# Patient Record
Sex: FEMALE | Race: WHITE | NOT HISPANIC OR LATINO | Employment: OTHER | URBAN - METROPOLITAN AREA
[De-identification: names, ages, dates, MRNs, and addresses within clinical notes are randomized per-mention and may not be internally consistent; named-entity substitution may affect disease eponyms.]

---

## 2017-04-04 ENCOUNTER — HOSPITAL ENCOUNTER (OUTPATIENT)
Dept: ONCOLOGY | Facility: CLINIC | Age: 67
Discharge: HOME | End: 2017-04-04
Attending: INTERNAL MEDICINE | Admitting: INTERNAL MEDICINE

## 2017-04-04 LAB
FERRITIN SERPL-MCNC: 41 NG/ML (ref 11–307)
FOLATE SERPL-MCNC: >24.8 NG/ML (ref 5.9–24.8)
IRON SATN MFR SERPL: 23 % (ref 15–50)
IRON SERPL-MCNC: 117 UG/DL (ref 28–170)
TIBC SERPL-MCNC: 504 UG/DL (ref 228–428)

## 2017-04-05 LAB
ANA SER QL IA: NORMAL

## 2017-04-11 ENCOUNTER — CONVERSION ENCOUNTER (OUTPATIENT)
Dept: OTHER | Facility: HOSPITAL | Age: 67
End: 2017-04-11

## 2017-05-02 ENCOUNTER — HOSPITAL ENCOUNTER (OUTPATIENT)
Dept: ONCOLOGY | Facility: CLINIC | Age: 67
Discharge: HOME | End: 2017-05-02
Attending: INTERNAL MEDICINE | Admitting: INTERNAL MEDICINE

## 2017-07-07 ENCOUNTER — HOSPITAL ENCOUNTER (OUTPATIENT)
Dept: ONCOLOGY | Facility: CLINIC | Age: 67
Discharge: HOME | End: 2017-07-07
Attending: INTERNAL MEDICINE | Admitting: INTERNAL MEDICINE

## 2017-08-01 ENCOUNTER — HOSPITAL ENCOUNTER (OUTPATIENT)
Dept: ONCOLOGY | Facility: CLINIC | Age: 67
Discharge: HOME | End: 2017-08-01
Attending: NURSE PRACTITIONER | Admitting: NURSE PRACTITIONER

## 2017-11-02 ENCOUNTER — HOSPITAL ENCOUNTER (OUTPATIENT)
Dept: ONCOLOGY | Facility: CLINIC | Age: 67
Setting detail: INFUSION SERIES
Discharge: HOME | End: 2017-11-02
Attending: INTERNAL MEDICINE | Admitting: INTERNAL MEDICINE

## 2017-11-02 ENCOUNTER — CLINICAL SUPPORT (OUTPATIENT)
Dept: ONCOLOGY | Facility: HOSPITAL | Age: 67
End: 2017-11-02

## 2017-11-02 ENCOUNTER — HOSPITAL ENCOUNTER (OUTPATIENT)
Dept: ONCOLOGY | Facility: HOSPITAL | Age: 67
Discharge: HOME | End: 2017-11-02
Attending: INTERNAL MEDICINE | Admitting: INTERNAL MEDICINE

## 2017-11-02 LAB
FERRITIN SERPL-MCNC: 46 NG/ML (ref 11–307)
VIT B12 SERPL-MCNC: 688 PG/ML (ref 180–914)

## 2017-11-02 NOTE — PROGRESS NOTES
PATIENTS ONCOLOGY RECORD LOCATED IN [facility]      Subjective     Name:  GEMINI HALL     Date:  2017  Address:  [address] [place] [address]  Home: [phone]  :  1950 AGE:  67 y.o.        RECORDS OBTAINED:  Patients Oncology Record is located in [facility]

## 2017-12-14 ENCOUNTER — HOSPITAL ENCOUNTER (OUTPATIENT)
Dept: MAMMOGRAPHY | Facility: HOSPITAL | Age: 67
Discharge: HOME | End: 2017-12-14
Attending: FAMILY MEDICINE | Admitting: FAMILY MEDICINE

## 2018-03-01 ENCOUNTER — CLINICAL SUPPORT (OUTPATIENT)
Dept: ONCOLOGY | Facility: HOSPITAL | Age: 68
End: 2018-03-01

## 2018-03-01 ENCOUNTER — HOSPITAL ENCOUNTER (OUTPATIENT)
Dept: ONCOLOGY | Facility: CLINIC | Age: 68
Setting detail: INFUSION SERIES
Discharge: HOME | End: 2018-03-01
Attending: NURSE PRACTITIONER | Admitting: NURSE PRACTITIONER

## 2018-03-01 ENCOUNTER — HOSPITAL ENCOUNTER (OUTPATIENT)
Dept: ONCOLOGY | Facility: HOSPITAL | Age: 68
Discharge: HOME | End: 2018-03-01
Attending: NURSE PRACTITIONER | Admitting: NURSE PRACTITIONER

## 2018-03-01 NOTE — PROGRESS NOTES
PATIENTS ONCOLOGY RECORD LOCATED IN [facility]      Subjective     Name:  GEMINI HALL     Date:  2018  Address:  [address] [place] [address]  Home: [phone]  :  1950 AGE:  67 y.o.        RECORDS OBTAINED:  Patients Oncology Record is located in [facility]

## 2018-03-08 ENCOUNTER — CLINICAL SUPPORT (OUTPATIENT)
Dept: ONCOLOGY | Facility: HOSPITAL | Age: 68
End: 2018-03-08

## 2018-03-08 ENCOUNTER — HOSPITAL ENCOUNTER (OUTPATIENT)
Dept: ONCOLOGY | Facility: CLINIC | Age: 68
Setting detail: INFUSION SERIES
Discharge: HOME | End: 2018-03-08
Attending: NURSE PRACTITIONER | Admitting: NURSE PRACTITIONER

## 2018-03-08 NOTE — PROGRESS NOTES
PATIENTS ONCOLOGY RECORD LOCATED IN [facility]      Subjective     Name:  GEMINI HALL     Date:  2018  Address:  [address] [place] [address]  Home: [phone]  :  1950 AGE:  67 y.o.        RECORDS OBTAINED:  Patients Oncology Record is located in [facility]

## 2018-04-10 ENCOUNTER — CONVERSION ENCOUNTER (OUTPATIENT)
Dept: OTHER | Facility: HOSPITAL | Age: 68
End: 2018-04-10

## 2018-07-05 ENCOUNTER — HOSPITAL ENCOUNTER (OUTPATIENT)
Dept: ONCOLOGY | Facility: HOSPITAL | Age: 68
Discharge: HOME | End: 2018-07-05
Attending: INTERNAL MEDICINE | Admitting: INTERNAL MEDICINE

## 2018-07-05 ENCOUNTER — CLINICAL SUPPORT (OUTPATIENT)
Dept: ONCOLOGY | Facility: HOSPITAL | Age: 68
End: 2018-07-05

## 2018-07-05 ENCOUNTER — HOSPITAL ENCOUNTER (OUTPATIENT)
Dept: ONCOLOGY | Facility: CLINIC | Age: 68
Setting detail: INFUSION SERIES
Discharge: HOME | End: 2018-07-05
Attending: INTERNAL MEDICINE | Admitting: INTERNAL MEDICINE

## 2018-07-05 LAB
ALBUMIN SERPL-MCNC: 4 G/DL (ref 3.5–4.8)
ALBUMIN/GLOB SERPL: 1.4 {RATIO} (ref 1–1.7)
ALP SERPL-CCNC: 42 IU/L (ref 32–91)
ALT SERPL-CCNC: 34 IU/L (ref 14–54)
ANION GAP SERPL CALC-SCNC: 10.2 MMOL/L (ref 10–20)
AST SERPL-CCNC: 31 IU/L (ref 15–41)
BILIRUB SERPL-MCNC: 0.6 MG/DL (ref 0.3–1.2)
BUN SERPL-MCNC: 10 MG/DL (ref 8–20)
BUN/CREAT SERPL: 12.5 (ref 5.4–26.2)
CALCIUM SERPL-MCNC: 9.3 MG/DL (ref 8.9–10.3)
CHLORIDE SERPL-SCNC: 107 MMOL/L (ref 101–111)
CONV CO2: 25 MMOL/L (ref 22–32)
CONV TOTAL PROTEIN: 6.9 G/DL (ref 6.1–7.9)
CREAT UR-MCNC: 0.8 MG/DL (ref 0.4–1)
GLOBULIN UR ELPH-MCNC: 2.9 G/DL (ref 2.5–3.8)
GLUCOSE SERPL-MCNC: 187 MG/DL (ref 65–99)
POTASSIUM SERPL-SCNC: 4.2 MMOL/L (ref 3.6–5.1)
SODIUM SERPL-SCNC: 138 MMOL/L (ref 136–144)

## 2018-07-05 NOTE — PROGRESS NOTES
PATIENTS ONCOLOGY RECORD LOCATED IN [facility]      Subjective     Name:  GEMINI HALL     Date:  2018  Address:  [address] [place] [address]  Home: [phone]  :  1950 AGE:  68 y.o.        RECORDS OBTAINED:  Patients Oncology Record is located in [facility]

## 2019-01-22 ENCOUNTER — CLINICAL SUPPORT (OUTPATIENT)
Dept: ONCOLOGY | Facility: HOSPITAL | Age: 69
End: 2019-01-22

## 2019-01-22 ENCOUNTER — HOSPITAL ENCOUNTER (OUTPATIENT)
Dept: ONCOLOGY | Facility: CLINIC | Age: 69
Setting detail: INFUSION SERIES
Discharge: HOME | End: 2019-01-22
Attending: NURSE PRACTITIONER | Admitting: NURSE PRACTITIONER

## 2019-01-22 NOTE — PROGRESS NOTES
PATIENTS ONCOLOGY RECORD LOCATED IN [facility]      Subjective     Name:  GEMINI HALL     Date:  2019  Address:  [address]   [place] [address]  Home: [unfilled]  :  1950 AGE:  68 y.o.        RECORDS OBTAINED:  Patients Oncology Record is located in [facility]

## 2019-02-20 ENCOUNTER — HOSPITAL ENCOUNTER (OUTPATIENT)
Dept: MAMMOGRAPHY | Facility: HOSPITAL | Age: 69
Discharge: HOME | End: 2019-02-20
Attending: FAMILY MEDICINE | Admitting: FAMILY MEDICINE

## 2019-06-04 VITALS — DIASTOLIC BLOOD PRESSURE: 68 MMHG | HEART RATE: 88 BPM | SYSTOLIC BLOOD PRESSURE: 147 MMHG | WEIGHT: 144 LBS

## 2019-06-04 VITALS
SYSTOLIC BLOOD PRESSURE: 126 MMHG | WEIGHT: 143.25 LBS | HEIGHT: 62 IN | BODY MASS INDEX: 26.36 KG/M2 | HEART RATE: 95 BPM | DIASTOLIC BLOOD PRESSURE: 63 MMHG

## 2019-07-22 DIAGNOSIS — D69.6 THROMBOCYTOPENIA (HCC): Primary | ICD-10-CM

## 2019-07-22 PROBLEM — E53.8 VITAMIN B12 DEFICIENCY: Status: ACTIVE | Noted: 2019-07-22

## 2019-07-22 PROBLEM — D69.3 ACUTE ITP: Status: ACTIVE | Noted: 2019-07-22

## 2019-07-22 PROBLEM — E61.1 IRON DEFICIENCY: Status: ACTIVE | Noted: 2019-07-22

## 2019-07-22 NOTE — PROGRESS NOTES
Hematology/Oncology Outpatient Follow Up    PATIENT NAME:Gi Velez  :1950  MRN: [id number]  PRIMARY CARE PHYSICIAN: Sissy Herrera DO  REFERRING PHYSICIAN: Jose Sellers MD    Chief Complaint   Patient presents with   • thrombocytopenia     Follow up    • Lymphocytosis     Follow up        HISTORY OF PRESENT ILLNESS:   1. Thrombocytopenia and lymphocytosis diagnosed 2009.  • Ms. Velez was hospitalized at [facility] on 09 with vomiting and diarrhea of one day’s duration.  There was also history of some confusion.  She was found to have a platelet count of 98,000/mm3 on admission, but dropped to 91,000/mm3 on 09, and hence, Hematology consultation was called.  She denied having had any gum bleeds or blood in the urine or stool.  She gave a history of remote nosebleeds.  She gave a history of her platelet count being low a number of years ago from a medicine that Dr. Melendez had given her, but had not had any problems more recently.  A CBC from  had revealed a platelet count of 148,000.  She denies having had any bleeding problems with prior surgeries or a family history of bleeders.  Her work up during the hospitalization revealed on 09 TSH was 0.78 µIU/mL.  On 09 a bone marrow aspiration and biopsy revealed normocellular bone marrow with the absence of iron stores.  Immunophenotyping failed to reveal an abnormal cell population and cytogenetics revealed 44 XX female karyotype.  Serum creatinine was 0.9 mg/dL (0.4-1), EBV IgM was negative, CMV IgM was negative, platelet antibody was negative, ALESSANDRO was negative, and a PT was 25.1 seconds.  On 09, a B12 and folate levels were 289 pg/mL (180-914) and 19.67 ng/mL (3-40) respectively.  A chest x-ray on 09 had no active cardiopulmonary disease and a CT scan of the brain on 09 was normal.  CT scans of the abdomen and pelvis on 09 had small nodular hyperdensities in the periphery of the  gallbladder and evidence of hysterectomy.  On 12/27/09 WBC was 5,200/mm3, hemoglobin 13 gm/dL, platelet count 91,000/mm3 with a differential of 45% segs, 34% lymphocytes, 19% monocytes, 1% eosinophils and 1% basophils.    • Medications reviewed and reveal Mirtazapine, Lyrica, Toprol, and Glimepiride to possibly cause thrombocytopenia.  • 12/16/09 - CT head without contrast revealed normal CT of the brain.    • 12/27/09 - CT abdomen and pelvis revealed evidence of hysterectomy, otherwise unremarkable.  • 12/27/09 - CXR revealed no active cardiopulmonary disease.    • 3/2/11 - Platelet count 115,000.  • 4/4/17 - Patient seen in the office after a six year absence on referral by Shirlene Giron M.D. for thrombocytopenia. WBC 6.3, hemoglobin 13.1, platelet count 127,000, MCV 81.7. Iron 117 (), TIBC 504 (228-428), ferritin 41 (), folate >24.8 (5.9-24.8), Vitamin B12 of 236 (211-911),  (). ALESSANDRO screen negative. PT PTT 12.2 and 27.9 seconds. Platelet antibodies negative. Review of medications revealed that Glimepiride and Lyrica to possibly cause thrombocytopenia.   • 4/7/17 - Patient started on Vitamin B12 at 1000 mcg sublingually daily.   • 5/2/17 - WBC 6.1 with 44% neutrophils, 42% lymphocytes, 8% monocytes, hemoglobin 13.8, MCV 82.9, platelet count 115,000. Discussed Lyrica with patient. She is taking it for neuropathy and has tried other things. Also discussed Glimepiride. Her blood sugars are well-controlled and I have told her that if Dr. Giron changes any treatment for her diabetes in the future then we should look at stopping Glimepiride. Patient has not had any recent GI workup and stool Hemoccult card given. Antiparietal cell and intrinsic factor antibodies negative.   • 8/1/17 - WBC 5.5 with 43% neutrophils, 44% lymphocytes, 8% monocytes, hemoglobin 13.8, MCV 81.2, platelet count 97,000. Patient states that she does bruise easily but that she can account for her bruises. She denied  any bleeding episodes of unexplainable bruising.   • 11/2/17 -  (N), B12 of 688 (N), ferritin 46 (N).   • 3/1/18 - Iron 96 (), TIBC 491 (228-428), iron saturation 20 (15-50), ferritin 51 ().       • 3/8/18 - Stool Hemoccult negative x3.   • 7/5/18 - H. pylori IgG 0.11 (negative), H. pylori IgA 0.13 (negative), H. pylori IgM 0.19 (negative).   • 7/23/19-patient claims not to have ever had a colonoscopy but stool Hemoccults were negative in the past.  Encouraged her to have a colonoscopy scheduled.    Past Medical History:   Diagnosis Date   • Diabetes mellitus (CMS/HCC)     diagnosed in the 1990's   • Irregular heart beat 2005   • Lymphocytosis 12/2009   • Neuropathy     diagnosed in the 1990's   • Thrombocytopenia (CMS/HCC) 12/2009       Past Surgical History:   Procedure Laterality Date   • HYSTERECTOMY      in the 1980's-increased bleeding   • TUBAL ABDOMINAL LIGATION           Current Outpatient Medications:   •  Ascorbic Acid (VITAMIN C PO), VITAMIN C, Disp: , Rfl:   •  atorvastatin (LIPITOR) 40 MG tablet, Take 40 mg by mouth Daily., Disp: , Rfl: 2  •  BIOTIN PO, BIOTIN, Disp: , Rfl:   •  Cholecalciferol 1000 units capsule, VITAMIN D 1000 UNIT ORAL CAPS, Disp: , Rfl:   •  CINNAMON PO, CINNAMON CAPS, Disp: , Rfl:   •  Cyanocobalamin (B-12 PO), B-12, Disp: , Rfl:   •  diltiaZEM (TIAZAC) 120 MG 24 hr capsule, Take 120 mg by mouth Daily., Disp: , Rfl: 5  •  doxylamine (UNISOM) 25 MG tablet, UNISOM TABLET, Disp: , Rfl:   •  glimepiride (AMARYL) 4 MG tablet, Take 4 mg by mouth 2 (Two) Times a Day., Disp: , Rfl: 3  •  lisinopril (PRINIVIL,ZESTRIL) 10 MG tablet, Take 10 mg by mouth Daily., Disp: , Rfl: 0  •  LYRICA 150 MG capsule, Take 150 mg by mouth 2 (Two) Times a Day., Disp: , Rfl: 3  •  metFORMIN ER (GLUCOPHAGE-XR) 500 MG 24 hr tablet, METFORMIN HCL  MG XR24H-TAB, Disp: , Rfl:   •  NOVOFINE PLUS 32G X 4 MM misc, USE DAILY TO INJECT INSULIN., Disp: , Rfl: 5  •  Omega-3 Fatty Acids (FISH OIL  PO), FISH OIL CAPS, Disp: , Rfl:   •  ONE TOUCH ULTRA TEST test strip, USE 1 STRIP EVERY 8 HOURS. E11.9, Disp: , Rfl: 5  •  TRESIBA FLEXTOUCH 200 UNIT/ML solution pen-injector, INJECT 10 UNITS BY SUBCUTANEOUS ROUTE AS PER INSULIN PROTOCOL, Disp: , Rfl: 5    Allergies   Allergen Reactions   • Other Hives     topomax and bee stings       Family History   Problem Relation Age of Onset   • No Known Problems Other        Cancer-related family history is not on file.    Social History     Tobacco Use   • Smoking status: Never Smoker   Substance Use Topics   • Alcohol use: No     Frequency: Never   • Drug use: Not on file       I have reviewed the history of present illness, past medical history, family history, social history, lab results, all notes and other records since the patient was last seen on 1/22/19.    SUBJECTIVE: Patient is here to follow up on thrombocytopenia and lymphocytosis. Reports to pain in her feet that she has had for 20+ years. It is a sharp pain and Lyrica helps with that.     KANDY Pedro present during office visit.           REVIEW OF SYSTEMS:  Review of Systems   Constitutional: Negative for chills and fever.   HENT: Negative for ear pain, mouth sores, nosebleeds and sore throat.    Eyes: Negative for photophobia and visual disturbance.   Respiratory: Negative for wheezing and stridor.    Cardiovascular: Negative for chest pain and palpitations.   Gastrointestinal: Negative for abdominal pain, diarrhea, nausea and vomiting.   Endocrine: Negative for cold intolerance and heat intolerance.   Genitourinary: Negative for dysuria and hematuria.   Musculoskeletal: Negative for joint swelling and neck stiffness.        Bilateral foot pain    Skin: Negative for color change and rash.   Neurological: Negative for seizures and syncope.   Hematological: Negative for adenopathy.        No obvious bleeding   Psychiatric/Behavioral: Negative for agitation, confusion and hallucinations.  "      OBJECTIVE:    Vitals:    07/23/19 0906   BP: 153/80   Pulse: 85   Resp: 18   Temp: 97.6 °F (36.4 °C)   Weight: 65.8 kg (145 lb)   Height: 157.5 cm (62\")   PainSc: 0-No pain       ECOG  (0) Fully active, able to carry on all predisease performance without restriction    Physical Exam   Constitutional: She is oriented to person, place, and time. No distress.   HENT:   Head: Normocephalic and atraumatic.   Dental fillings   Eyes: Conjunctivae and EOM are normal. Right eye exhibits no discharge. Left eye exhibits no discharge. No scleral icterus.   Neck: Normal range of motion. Neck supple. No thyromegaly present.   Cardiovascular: Normal rate, regular rhythm and normal heart sounds. Exam reveals no gallop and no friction rub.   Pulmonary/Chest: Effort normal. No stridor. No respiratory distress. She has no wheezes.   Abdominal: Soft. Bowel sounds are normal. She exhibits no mass. There is no tenderness. There is no rebound and no guarding.   Musculoskeletal: Normal range of motion. She exhibits no tenderness.   Lymphadenopathy:     She has no cervical adenopathy.   Neurological: She is alert and oriented to person, place, and time. She exhibits normal muscle tone.   Skin: Skin is warm. No rash noted. She is not diaphoretic. No erythema.   Psychiatric: She has a normal mood and affect. Her behavior is normal.   Nursing note and vitals reviewed.      RECENT LABS  WBC   Date Value Ref Range Status   07/23/2019 6.36 3.40 - 10.80 10*3/mm3 Final     RBC   Date Value Ref Range Status   07/23/2019 5.19 3.77 - 5.28 10*6/mm3 Final     Hemoglobin   Date Value Ref Range Status   07/23/2019 14.4 12.0 - 15.9 g/dL Final     Hematocrit   Date Value Ref Range Status   07/23/2019 43.2 34.0 - 46.6 % Final     MCV   Date Value Ref Range Status   07/23/2019 83.2 79.0 - 97.0 fL Final     MCH   Date Value Ref Range Status   07/23/2019 27.7 26.6 - 33.0 pg Final     MCHC   Date Value Ref Range Status   07/23/2019 33.3 31.5 - 35.7 g/dL " Final     RDW   Date Value Ref Range Status   07/23/2019 16.4 (H) 12.3 - 15.4 % Final     RDW-SD   Date Value Ref Range Status   07/23/2019 49.2 37.0 - 54.0 fl Final     MPV   Date Value Ref Range Status   07/23/2019 13.5 (H) 6.0 - 12.0 fL Final     Platelets   Date Value Ref Range Status   07/23/2019 109 (L) 140 - 450 10*3/mm3 Final     Neutrophil %   Date Value Ref Range Status   07/23/2019 40.8 (L) 42.7 - 76.0 % Final     Lymphocyte %   Date Value Ref Range Status   07/23/2019 46.7 (H) 19.6 - 45.3 % Final     Monocyte %   Date Value Ref Range Status   07/23/2019 8.2 5.0 - 12.0 % Final     Eosinophil %   Date Value Ref Range Status   07/23/2019 3.8 0.3 - 6.2 % Final     Basophil %   Date Value Ref Range Status   07/23/2019 0.5 0.0 - 1.5 % Final     Neutrophils, Absolute   Date Value Ref Range Status   07/23/2019 2.60 1.70 - 7.00 10*3/mm3 Final     Lymphocytes, Absolute   Date Value Ref Range Status   07/23/2019 2.97 0.70 - 3.10 10*3/mm3 Final     Monocytes, Absolute   Date Value Ref Range Status   07/23/2019 0.52 0.10 - 0.90 10*3/mm3 Final     Eosinophils, Absolute   Date Value Ref Range Status   07/23/2019 0.24 0.00 - 0.40 10*3/mm3 Final     Basophils, Absolute   Date Value Ref Range Status   07/23/2019 0.03 0.00 - 0.20 10*3/mm3 Final     nRBC   Date Value Ref Range Status   01/09/2018 0 0 /100[WBCs] Final       Lab Results   Component Value Date    GLUCOSE 187 (H) 07/05/2018    BUN 10 07/05/2018    CREATININE 0.8 07/05/2018    BCR 12.5 07/05/2018    K 4.2 07/05/2018    CO2 25 07/05/2018    CALCIUM 9.3 07/05/2018    ALBUMIN 4.0 07/05/2018    LABIL2 1.4 07/05/2018    AST 31 07/05/2018    ALT 34 07/05/2018         Assessment/Plan     Thrombocytopenia (CMS/HCC)  - CBC & Differential  - CBC Auto Differential  - Comprehensive Metabolic Panel    Vitamin B12 deficiency    Iron deficiency  - Iron Profile      ASSESSMENT:  Patient has thrombocytopenia on basis of either ITP or 1 of her drugs (Lyrica/glimepiride). Patient  denies having had any nosebleeds, gum bleeds, blood in the urine or blood in the stool.  She does bruise easily.  She takes aspirin 81 mg p.o. daily.  She is still on Lyrica and glimepiride.  Have discussed signs and symptoms to watch for for further drop in platelet count.  Discussed her counts are low but stable.  Her platelet count is adequate for minor surgical procedure.  She continues to take vitamin B12 and is currently taking 1 mg p.o. daily.  She claims never to have had a colonoscopy performed though her stool Hemoccults were negative.  Encourage her to have a screening colonoscopy performed.      PLAN:  I let her know that she should have a screenig colonoscopy.   Iron, TIBC and CMP next visit.         I have reviewed labs results, imaging, vitals, and medications with the patient today. Will follow up in 6 months with NP.    Patient verbalized understanding and is in agreement of the above plan.    I have reviewed and validated the information above.   Jose Sellers M.D., F.A.C.P.        Much of the above report is an electronic transcription//translation of the spoken language to printed text using Dragon Software. As such, the subtleties and finesse of the spoken language may permit erroneous, or at times, nonsensical words or phrases to be inadvertently transcribed; thus changes may be made at a later date to rectify these errors.

## 2019-07-23 ENCOUNTER — OFFICE VISIT (OUTPATIENT)
Dept: ONCOLOGY | Facility: CLINIC | Age: 69
End: 2019-07-23

## 2019-07-23 ENCOUNTER — APPOINTMENT (OUTPATIENT)
Dept: LAB | Facility: HOSPITAL | Age: 69
End: 2019-07-23

## 2019-07-23 VITALS
HEIGHT: 62 IN | TEMPERATURE: 97.6 F | HEART RATE: 85 BPM | DIASTOLIC BLOOD PRESSURE: 80 MMHG | RESPIRATION RATE: 18 BRPM | WEIGHT: 145 LBS | SYSTOLIC BLOOD PRESSURE: 153 MMHG | BODY MASS INDEX: 26.68 KG/M2

## 2019-07-23 DIAGNOSIS — D69.6 THROMBOCYTOPENIA (HCC): ICD-10-CM

## 2019-07-23 DIAGNOSIS — E53.8 VITAMIN B12 DEFICIENCY: Primary | ICD-10-CM

## 2019-07-23 DIAGNOSIS — E61.1 IRON DEFICIENCY: ICD-10-CM

## 2019-07-23 LAB
BASOPHILS # BLD AUTO: 0.03 10*3/MM3 (ref 0–0.2)
BASOPHILS NFR BLD AUTO: 0.5 % (ref 0–1.5)
DEPRECATED RDW RBC AUTO: 49.2 FL (ref 37–54)
EOSINOPHIL # BLD AUTO: 0.24 10*3/MM3 (ref 0–0.4)
EOSINOPHIL NFR BLD AUTO: 3.8 % (ref 0.3–6.2)
ERYTHROCYTE [DISTWIDTH] IN BLOOD BY AUTOMATED COUNT: 16.4 % (ref 12.3–15.4)
HCT VFR BLD AUTO: 43.2 % (ref 34–46.6)
HGB BLD-MCNC: 14.4 G/DL (ref 12–15.9)
LYMPHOCYTES # BLD AUTO: 2.97 10*3/MM3 (ref 0.7–3.1)
LYMPHOCYTES NFR BLD AUTO: 46.7 % (ref 19.6–45.3)
MCH RBC QN AUTO: 27.7 PG (ref 26.6–33)
MCHC RBC AUTO-ENTMCNC: 33.3 G/DL (ref 31.5–35.7)
MCV RBC AUTO: 83.2 FL (ref 79–97)
MONOCYTES # BLD AUTO: 0.52 10*3/MM3 (ref 0.1–0.9)
MONOCYTES NFR BLD AUTO: 8.2 % (ref 5–12)
NEUTROPHILS # BLD AUTO: 2.6 10*3/MM3 (ref 1.7–7)
NEUTROPHILS NFR BLD AUTO: 40.8 % (ref 42.7–76)
PLATELET # BLD AUTO: 109 10*3/MM3 (ref 140–450)
PMV BLD AUTO: 13.5 FL (ref 6–12)
RBC # BLD AUTO: 5.19 10*6/MM3 (ref 3.77–5.28)
WBC NRBC COR # BLD: 6.36 10*3/MM3 (ref 3.4–10.8)

## 2019-07-23 PROCEDURE — 36415 COLL VENOUS BLD VENIPUNCTURE: CPT | Performed by: INTERNAL MEDICINE

## 2019-07-23 PROCEDURE — 99214 OFFICE O/P EST MOD 30 MIN: CPT | Performed by: INTERNAL MEDICINE

## 2019-07-23 PROCEDURE — 85025 COMPLETE CBC W/AUTO DIFF WBC: CPT | Performed by: INTERNAL MEDICINE

## 2019-07-23 RX ORDER — LISINOPRIL 10 MG/1
10 TABLET ORAL DAILY
Refills: 0 | COMMUNITY
Start: 2019-04-19 | End: 2020-06-17

## 2019-07-23 RX ORDER — INSULIN DEGLUDEC 200 U/ML
22 INJECTION, SOLUTION SUBCUTANEOUS DAILY
Refills: 5 | COMMUNITY
Start: 2019-04-23

## 2019-07-23 RX ORDER — GLIMEPIRIDE 4 MG/1
TABLET ORAL
Refills: 3 | COMMUNITY
Start: 2019-04-19 | End: 2022-10-24 | Stop reason: SDUPTHER

## 2019-07-23 RX ORDER — METFORMIN HYDROCHLORIDE 500 MG/1
TABLET, EXTENDED RELEASE ORAL 2 TIMES DAILY
COMMUNITY
Start: 2015-03-31 | End: 2020-06-17

## 2019-07-23 RX ORDER — DILTIAZEM HYDROCHLORIDE 120 MG/1
120 CAPSULE, EXTENDED RELEASE ORAL DAILY
Refills: 5 | COMMUNITY
Start: 2019-06-02 | End: 2022-02-07 | Stop reason: SDUPTHER

## 2019-07-23 RX ORDER — ATORVASTATIN CALCIUM 40 MG/1
40 TABLET, FILM COATED ORAL DAILY
Refills: 2 | COMMUNITY
Start: 2019-04-19 | End: 2022-08-15 | Stop reason: SDUPTHER

## 2019-08-15 ENCOUNTER — TRANSCRIBE ORDERS (OUTPATIENT)
Dept: ADMINISTRATIVE | Facility: HOSPITAL | Age: 69
End: 2019-08-15

## 2019-08-15 DIAGNOSIS — D69.6 THROMBOCYTOPENIA, UNSPECIFIED (HCC): Primary | ICD-10-CM

## 2019-08-15 DIAGNOSIS — M89.9 DISORDER OF BONE: ICD-10-CM

## 2019-08-15 DIAGNOSIS — N95.9 MENOPAUSAL AND POSTMENOPAUSAL DISORDER: ICD-10-CM

## 2019-08-21 ENCOUNTER — HOSPITAL ENCOUNTER (OUTPATIENT)
Dept: BONE DENSITY | Facility: HOSPITAL | Age: 69
Discharge: HOME | End: 2019-08-21
Admitting: FAMILY MEDICINE

## 2019-08-21 DIAGNOSIS — N95.9 MENOPAUSAL AND POSTMENOPAUSAL DISORDER: ICD-10-CM

## 2019-08-21 DIAGNOSIS — M89.9 DISORDER OF BONE: ICD-10-CM

## 2019-08-21 DIAGNOSIS — D69.6 THROMBOCYTOPENIA, UNSPECIFIED (HCC): ICD-10-CM

## 2019-08-21 PROCEDURE — 77080 DXA BONE DENSITY AXIAL: CPT

## 2019-09-17 ENCOUNTER — TELEPHONE (OUTPATIENT)
Dept: ONCOLOGY | Facility: CLINIC | Age: 69
End: 2019-09-17

## 2019-09-17 NOTE — TELEPHONE ENCOUNTER
Ms. Velez left message asking when next appt was.  Returned call, left message with date/time (1/21/20 at 9:30) of next appt.

## 2020-01-21 ENCOUNTER — APPOINTMENT (OUTPATIENT)
Dept: LAB | Facility: HOSPITAL | Age: 70
End: 2020-01-21

## 2020-06-17 ENCOUNTER — OFFICE VISIT (OUTPATIENT)
Dept: ENDOCRINOLOGY | Facility: CLINIC | Age: 70
End: 2020-06-17

## 2020-06-17 VITALS
HEIGHT: 62 IN | SYSTOLIC BLOOD PRESSURE: 118 MMHG | DIASTOLIC BLOOD PRESSURE: 68 MMHG | TEMPERATURE: 98 F | WEIGHT: 142 LBS | OXYGEN SATURATION: 96 % | HEART RATE: 70 BPM | BODY MASS INDEX: 26.13 KG/M2

## 2020-06-17 DIAGNOSIS — E11.69 TYPE 2 DIABETES MELLITUS WITH OTHER SPECIFIED COMPLICATION, UNSPECIFIED WHETHER LONG TERM INSULIN USE (HCC): Primary | ICD-10-CM

## 2020-06-17 DIAGNOSIS — E55.9 VITAMIN D DEFICIENCY: ICD-10-CM

## 2020-06-17 DIAGNOSIS — E78.2 MIXED HYPERLIPIDEMIA: ICD-10-CM

## 2020-06-17 PROBLEM — G47.33 OBSTRUCTIVE SLEEP APNEA: Status: ACTIVE | Noted: 2017-04-11

## 2020-06-17 PROBLEM — E78.5 HYPERLIPIDEMIA: Status: ACTIVE | Noted: 2020-06-17

## 2020-06-17 LAB — GLUCOSE BLDC GLUCOMTR-MCNC: 133 MG/DL (ref 70–130)

## 2020-06-17 PROCEDURE — 99204 OFFICE O/P NEW MOD 45 MIN: CPT | Performed by: INTERNAL MEDICINE

## 2020-06-17 PROCEDURE — 82962 GLUCOSE BLOOD TEST: CPT | Performed by: INTERNAL MEDICINE

## 2020-06-17 RX ORDER — BENAZEPRIL HYDROCHLORIDE 10 MG/1
10 TABLET ORAL DAILY
COMMUNITY
Start: 2020-04-30 | End: 2022-10-24 | Stop reason: SDUPTHER

## 2020-06-17 NOTE — PATIENT INSTRUCTIONS
Schedule MNT. Bring 2 d food records to appt.  Continue exercise.  Continue same meds. Prime insulin pen before every shot.  Check blood before meals & bedtime 2 d / week.  Call if blood sugars are running under 100 or over 200.  F/u in 6 months, with labs prior.

## 2020-06-21 NOTE — PROGRESS NOTES
[facility]    Referring Provider: Dr. Sissy Herrera  Reason for Consultation: Diabetes evaluation & management.    Patient Care Team:  Sissy Herrera DO as PCP - General (Family Medicine)    Chief complaint Diabetes (type 2)      Subjective .     History of present illness:    This is a  70 y.o. female with type 2 Diabetes diagnosed in 1998 during routine exam.  Started on metformin. Then glimepiride added. Still taking both.  Tresiba insulin started in 2017. Taking 16 units daily. Does not prime the pen before each injection.  Seen at the [facility] from [id number]. Graduated.  Attended diabetes education @ [facility].  Has an One Touch Ultra Mini meter 5y old. Blood sugars in the mid 100's. Checking 1-2 x / day.  Walking for exercise.  Taking vit D supplements.  Last eye exam October 2019.    Review of Systems  Review of Systems   Constitutional: Negative for unexpected weight gain.   HENT: Negative for trouble swallowing.    Eyes: Negative for blurred vision.   Respiratory: Negative for shortness of breath.    Cardiovascular: Negative for leg swelling.   Gastrointestinal: Positive for diarrhea. Negative for constipation and nausea.   Endocrine: Positive for polydipsia. Negative for polyuria.   Musculoskeletal:        Pain in feet   Neurological: Positive for tremors and numbness. Negative for headache.       History  Past Medical History:   Diagnosis Date   • Diabetes mellitus (CMS/HCC)     diagnosed in the 1990's   • Hyperlipidemia 2000   • Hypertension 2018   • Irregular heart beat 2005   • Lymphocytosis 12/2009   • Neuropathy     diagnosed in the 1990's   • Thrombocytopenia (CMS/HCC) 12/2009   • Type 2 diabetes mellitus (CMS/HCC) 1998     Past Surgical History:   Procedure Laterality Date   • HYSTERECTOMY      in the 1980's-increased bleeding   • TUBAL ABDOMINAL LIGATION       Family History   Problem Relation Age of Onset   • No Known Problems Other      Social History     Tobacco Use   •  Smoking status: Never Smoker   • Smokeless tobacco: Never Used   Substance Use Topics   • Alcohol use: Yes     Frequency: Never     Comment: rare occasion   • Drug use: Defer     PRN Meds:    Allergies:  Adhesive tape; Other; and Topiramate    Objective     Vital Signs       Vitals:    06/17/20 1002   BP: 118/68   Pulse: 70   Temp: 98 °F (36.7 °C)   SpO2: 96%         Physical Exam:     General Appearance:    Alert, cooperative, in no acute distress   Head:    Normocephalic, without obvious abnormality, atraumatic   Eyes:            Lids and lashes normal, conjunctivae and sclerae normal, no   icterus, no pallor, corneas clear, PERRLA   Throat:   No oral lesions,  oral mucosa moist   Neck:   36 cm in circumference, thyroid firm, no   carotid bruit   Lungs:     Clear    Heart:    Regular rhythm and normal rate   Chest Wall:    No abnormalities observed   Abdomen:     Normal bowel sounds, soft                 Extremities:   Moves all extremities well, no edema               Pulses:   Pulses palpable and equal bilaterally   Skin:   Dry   Neurologic:  DTR absent in ankles, vibratory sense 90% decreased in toes, able to feel the 10g monofilament, except in toes       Results Review  I have reviewed the patient's new clinical results, labs & imaging.    Lab Results (last 24 hours)     ** No results found for the last 24 hours. **        A1C 8.6%; cr 1.0, K 4.3, ALT 25; Chol 157, Trigl 194; TSH 2.6; vit D level 54 on 1/23/2020    Assessment/Plan     1. Diabetes type 2, with hyperglycemia & peripheral neuropathy  2. Hypertension  3. Hyperlipidemia  4. Goiter, euthyroid   5. Vitamin D Deficiency      Schedule MNT. Bring 2 d food records to appt.  Continue exercise.  Continue same meds. Prime insulin pen before every shot.  Check blood before meals & bedtime 2 d / week.  Call if blood sugars are running under 100 or over 200.    I discussed the patients findings and my recommendations with patient    Vic Cerna,  MD  06/21/20  20:35

## 2020-07-10 ENCOUNTER — OFFICE VISIT (OUTPATIENT)
Dept: ENDOCRINOLOGY | Facility: CLINIC | Age: 70
End: 2020-07-10

## 2020-07-10 DIAGNOSIS — E11.65 UNCONTROLLED TYPE 2 DIABETES MELLITUS WITH HYPERGLYCEMIA (HCC): ICD-10-CM

## 2020-07-10 PROCEDURE — 97802 MEDICAL NUTRITION INDIV IN: CPT | Performed by: DIETITIAN, REGISTERED

## 2020-12-08 ENCOUNTER — LAB (OUTPATIENT)
Dept: LAB | Facility: HOSPITAL | Age: 70
End: 2020-12-08

## 2020-12-08 DIAGNOSIS — E78.2 MIXED HYPERLIPIDEMIA: ICD-10-CM

## 2020-12-08 DIAGNOSIS — E11.69 TYPE 2 DIABETES MELLITUS WITH OTHER SPECIFIED COMPLICATION, UNSPECIFIED WHETHER LONG TERM INSULIN USE (HCC): ICD-10-CM

## 2020-12-08 LAB
ALBUMIN SERPL-MCNC: 4.4 G/DL (ref 3.5–5.2)
ALBUMIN/GLOB SERPL: 1.7 G/DL
ALP SERPL-CCNC: 50 U/L (ref 39–117)
ALT SERPL W P-5'-P-CCNC: 32 U/L (ref 1–33)
ANION GAP SERPL CALCULATED.3IONS-SCNC: 12.4 MMOL/L (ref 5–15)
AST SERPL-CCNC: 30 U/L (ref 1–32)
BILIRUB SERPL-MCNC: 0.4 MG/DL (ref 0–1.2)
BUN SERPL-MCNC: 15 MG/DL (ref 8–23)
BUN/CREAT SERPL: 15.5 (ref 7–25)
CALCIUM SPEC-SCNC: 9.4 MG/DL (ref 8.6–10.5)
CHLORIDE SERPL-SCNC: 103 MMOL/L (ref 98–107)
CO2 SERPL-SCNC: 24.6 MMOL/L (ref 22–29)
CREAT SERPL-MCNC: 0.97 MG/DL (ref 0.57–1)
GFR SERPL CREATININE-BSD FRML MDRD: 56 ML/MIN/1.73
GLOBULIN UR ELPH-MCNC: 2.6 GM/DL
GLUCOSE SERPL-MCNC: 174 MG/DL (ref 65–99)
POTASSIUM SERPL-SCNC: 4.5 MMOL/L (ref 3.5–5.2)
PROT SERPL-MCNC: 7 G/DL (ref 6–8.5)
SODIUM SERPL-SCNC: 140 MMOL/L (ref 136–145)

## 2020-12-08 PROCEDURE — 36415 COLL VENOUS BLD VENIPUNCTURE: CPT

## 2020-12-08 PROCEDURE — 83036 HEMOGLOBIN GLYCOSYLATED A1C: CPT

## 2020-12-08 PROCEDURE — 80053 COMPREHEN METABOLIC PANEL: CPT

## 2020-12-09 LAB — HBA1C MFR BLD: 8.6 % (ref 3.5–5.6)

## 2020-12-15 ENCOUNTER — OFFICE VISIT (OUTPATIENT)
Dept: ENDOCRINOLOGY | Facility: CLINIC | Age: 70
End: 2020-12-15

## 2020-12-15 VITALS
HEART RATE: 85 BPM | DIASTOLIC BLOOD PRESSURE: 62 MMHG | TEMPERATURE: 97.8 F | WEIGHT: 143.8 LBS | OXYGEN SATURATION: 98 % | SYSTOLIC BLOOD PRESSURE: 116 MMHG | HEIGHT: 62 IN | BODY MASS INDEX: 26.46 KG/M2

## 2020-12-15 DIAGNOSIS — E78.2 MIXED HYPERLIPIDEMIA: ICD-10-CM

## 2020-12-15 DIAGNOSIS — E11.42 TYPE 2 DIABETES MELLITUS WITH DIABETIC POLYNEUROPATHY, WITH LONG-TERM CURRENT USE OF INSULIN (HCC): Primary | ICD-10-CM

## 2020-12-15 DIAGNOSIS — Z79.4 TYPE 2 DIABETES MELLITUS WITH DIABETIC POLYNEUROPATHY, WITH LONG-TERM CURRENT USE OF INSULIN (HCC): Primary | ICD-10-CM

## 2020-12-15 DIAGNOSIS — E55.9 VITAMIN D DEFICIENCY: ICD-10-CM

## 2020-12-15 LAB — GLUCOSE BLDC GLUCOMTR-MCNC: 278 MG/DL (ref 70–105)

## 2020-12-15 PROCEDURE — 82962 GLUCOSE BLOOD TEST: CPT | Performed by: INTERNAL MEDICINE

## 2020-12-15 PROCEDURE — 99214 OFFICE O/P EST MOD 30 MIN: CPT | Performed by: INTERNAL MEDICINE

## 2020-12-15 RX ORDER — ASPIRIN 81 MG/1
81 TABLET ORAL DAILY
COMMUNITY
End: 2022-10-10

## 2020-12-15 RX ORDER — DOCUSATE SODIUM 100 MG/1
100 CAPSULE, LIQUID FILLED ORAL 2 TIMES DAILY
COMMUNITY

## 2020-12-15 NOTE — PATIENT INSTRUCTIONS
Continue exercise.  Continue diabetes & chol meds & vit D supplements..  Wait 15-30 min to eat after glimepiride & metformin.  Call if blood sugars are running under 100 or over 200.  F/u in 6 months, with fasting labs prior.

## 2020-12-20 NOTE — PROGRESS NOTES
[facility]        Patient Care Team:  Sissy Herrera DO as PCP - General (Family Medicine)    Chief Complaint:    Chief Complaint   Patient presents with   • Diabetes     type 2, bs 278, ate 2.5 hrs ago, insulin same time         Subjective   Here for diabetes f/u  Blood sugars: did not bring records  Exercise program: walking  Eating Lean cuisine  Taking vit D  Attended [facility]NT in July    Interval History:     Patient Complaints: none  Patient Denies:  hypoglycemia  History taken from: patient    Review of Systems:   Review of Systems   Eyes: Negative for blurred vision.   Gastrointestinal: Negative for nausea.   Endocrine: Negative for polyuria.   Neurological: Negative for headache.     Gained 1 lb since last visit    Objective     Vital Signs      Vitals:    12/15/20 1122   BP: 116/62   Pulse: 85   Temp: 97.8 °F (36.6 °C)   SpO2: 98%         Physical Exam:     General Appearance:    Alert, cooperative, in no acute distress   Head:    Normocephalic, without obvious abnormality, atraumatic   Eyes:            Lids and lashes normal, conjunctivae and sclerae normal, no   icterus, no pallor, corneas clear, PERRLA   Throat:   No oral lesions,  oral mucosa moist   Neck:   No adenopathy, supple,  no thyromegaly, no   carotid bruit   Lungs:     Clear     Heart:    Regular rhythm and normal rate   Chest Wall:    No abnormalities observed   Abdomen:     Normal bowel sounds, soft                 Extremities:   Hammer toes, no edema               Pulses:   Pulses palpable and equal bilaterally   Skin:   Dry. No bruising or rash   Neurologic:  DTR absent, able to feel the 10g monofilament, except toes          Results Review:    I have reviewed the patient's new clinical results, labs & imaging.    Medication Review:   Prior to Admission medications    Medication Sig Start Date End Date Taking? Authorizing Provider   Ascorbic Acid (VITAMIN C PO) VITAMIN C 4/10/18  Yes Provider, MD Ros   aspirin 81  MG EC tablet Take 81 mg by mouth Daily.   Yes Ros Shah MD   atorvastatin (LIPITOR) 40 MG tablet Take 40 mg by mouth Daily. 4/19/19  Yes Ros Shah MD   benazepril (LOTENSIN) 10 MG tablet Take 10 mg by mouth Daily. 4/30/20  Yes Ros Shah MD   BIOTIN PO BIOTIN 4/10/18  Yes Ros Shah MD   Cholecalciferol 1000 units capsule VITAMIN D 1000 UNIT ORAL CAPS 3/31/15  Yes Ros Shah MD   CINNAMON PO CINNAMON CAPS 4/10/18  Yes Ros Shah MD   Cyanocobalamin (B-12 PO) B-12 4/10/18  Yes Ros Shah MD   diltiaZEM (TIAZAC) 120 MG 24 hr capsule Take 120 mg by mouth Daily. 6/2/19  Yes Ros Shah MD   docusate sodium (COLACE) 100 MG capsule Take 100 mg by mouth 2 (Two) Times a Day.   Yes Ros Shah MD   doxylamine (UNISOM) 25 MG tablet Take 25 mg by mouth At Night As Needed for Sleep.   Yes Ros Shah MD   glimepiride (AMARYL) 4 MG tablet Take 4 mg by mouth 2 (Two) Times a Day. 4/19/19  Yes Ros Shah MD   LYRICA 150 MG capsule Take 150 mg by mouth 2 (Two) Times a Day. 6/19/19  Yes Ros Shah MD   metFORMIN (GLUCOPHAGE) 500 MG tablet  6/11/20  Yes Ros Shah MD   NOVOFINE PLUS 32G X 4 MM misc USE DAILY TO INJECT INSULIN. 7/2/19  Yes Ros Shah MD   Omega-3 Fatty Acids (FISH OIL PO) FISH OIL CAPS 4/11/17  Yes Ros Shah MD   ONE TOUCH ULTRA TEST test strip USE 1 STRIP EVERY 8 HOURS. E11.9 6/19/19  Yes Ros Shah MD   TRESIBA FLEXTOUCH 200 UNIT/ML solution pen-injector Inject 16 Units under the skin into the appropriate area as directed Daily. 4/23/19  Yes Ros Shah MD       Lab Results (most recent)     Procedure Component Value Units Date/Time    POC Glucose [[id number]]  (Abnormal) Collected: 12/15/20 1122    Specimen: Blood Updated: 12/15/20 1134     Glucose 278 mg/dL      Comment: Serial Number: [id number]:  [id number]             Lab  Results   Component Value Date    HGBA1C 8.6 (H) 12/08/2020      Lab Results   Component Value Date    GLUCOSE 174 (H) 12/08/2020    BUN 15 12/08/2020    CREATININE 0.97 12/08/2020    EGFRIFNONA 56 (L) 12/08/2020    BCR 15.5 12/08/2020    K 4.5 12/08/2020    CO2 24.6 12/08/2020    CALCIUM 9.4 12/08/2020    ALBUMIN 4.40 12/08/2020    LABIL2 1.4 07/05/2018    AST 30 12/08/2020    ALT 32 12/08/2020    CHOL 159 01/10/2018     (H) 01/10/2018    HDL 34 (L) 01/10/2018    TRIG 104 01/10/2018       Assessment/Plan     Diagnoses and all orders for this visit:    1. Type 2 diabetes mellitus with diabetic polyneuropathy, with long-term current use of insulin (CMS/[facility]) (Primary)  -     Hemoglobin A1c; Future  -     Microalbumin / Creatinine Urine Ratio - Urine, Clean Catch; Future    2. Vitamin D deficiency  -     Vitamin D 25 Hydroxy; Future    3. Mixed hyperlipidemia  -     Comprehensive Metabolic Panel; Future  -     Lipid Panel; Future  -     TSH; Future    Other orders  -     POC Glucose    Glucose control not @ goal. Will check lipid & vit D status next visit.    Continue exercise.  Continue diabetes & chol meds & vit D supplements..  Wait 15-30 min to eat after glimepiride & metformin.  Call if blood sugars are running under 100 or over 200.        Vic Cerna MD  12/19/20  21:54 EST

## 2021-01-15 ENCOUNTER — TRANSCRIBE ORDERS (OUTPATIENT)
Dept: ADMINISTRATIVE | Facility: HOSPITAL | Age: 71
End: 2021-01-15

## 2021-01-15 DIAGNOSIS — Z12.31 BREAST CANCER SCREENING BY MAMMOGRAM: Primary | ICD-10-CM

## 2021-01-18 ENCOUNTER — TRANSCRIBE ORDERS (OUTPATIENT)
Dept: ADMINISTRATIVE | Facility: HOSPITAL | Age: 71
End: 2021-01-18

## 2021-01-18 DIAGNOSIS — N95.1 SYMPTOMATIC MENOPAUSAL OR FEMALE CLIMACTERIC STATES: Primary | ICD-10-CM

## 2021-01-27 ENCOUNTER — HOSPITAL ENCOUNTER (OUTPATIENT)
Dept: MAMMOGRAPHY | Facility: HOSPITAL | Age: 71
Discharge: HOME | End: 2021-01-27
Admitting: FAMILY MEDICINE

## 2021-01-27 DIAGNOSIS — Z12.31 BREAST CANCER SCREENING BY MAMMOGRAM: ICD-10-CM

## 2021-01-27 PROCEDURE — 77067 SCR MAMMO BI INCL CAD: CPT

## 2021-01-27 PROCEDURE — 77063 BREAST TOMOSYNTHESIS BI: CPT

## 2021-02-12 ENCOUNTER — APPOINTMENT (OUTPATIENT)
Dept: MAMMOGRAPHY | Facility: HOSPITAL | Age: 71
End: 2021-02-12

## 2021-02-12 ENCOUNTER — HOSPITAL ENCOUNTER (OUTPATIENT)
Dept: ULTRASOUND IMAGING | Facility: HOSPITAL | Age: 71
End: 2021-02-12

## 2021-03-03 ENCOUNTER — HOSPITAL ENCOUNTER (OUTPATIENT)
Dept: ULTRASOUND IMAGING | Facility: HOSPITAL | Age: 71
Discharge: HOME | End: 2021-03-03

## 2021-03-03 ENCOUNTER — HOSPITAL ENCOUNTER (OUTPATIENT)
Dept: MAMMOGRAPHY | Facility: HOSPITAL | Age: 71
Discharge: HOME | End: 2021-03-03

## 2021-03-03 DIAGNOSIS — R92.8 ABNORMALITY OF RIGHT BREAST ON SCREENING MAMMOGRAM: ICD-10-CM

## 2021-03-03 PROCEDURE — 76642 ULTRASOUND BREAST LIMITED: CPT

## 2021-03-03 PROCEDURE — G0279 TOMOSYNTHESIS, MAMMO: HCPCS

## 2021-03-03 PROCEDURE — 77065 DX MAMMO INCL CAD UNI: CPT

## 2021-06-14 ENCOUNTER — LAB (OUTPATIENT)
Dept: LAB | Facility: HOSPITAL | Age: 71
End: 2021-06-14

## 2021-06-14 DIAGNOSIS — E11.42 TYPE 2 DIABETES MELLITUS WITH DIABETIC POLYNEUROPATHY, WITH LONG-TERM CURRENT USE OF INSULIN (HCC): ICD-10-CM

## 2021-06-14 DIAGNOSIS — E55.9 VITAMIN D DEFICIENCY: ICD-10-CM

## 2021-06-14 DIAGNOSIS — Z79.4 TYPE 2 DIABETES MELLITUS WITH DIABETIC POLYNEUROPATHY, WITH LONG-TERM CURRENT USE OF INSULIN (HCC): ICD-10-CM

## 2021-06-14 DIAGNOSIS — E78.2 MIXED HYPERLIPIDEMIA: ICD-10-CM

## 2021-06-14 LAB
25(OH)D3 SERPL-MCNC: 57 NG/ML (ref 30–100)
ALBUMIN SERPL-MCNC: 4.5 G/DL (ref 3.5–5.2)
ALBUMIN UR-MCNC: <1.2 MG/DL
ALBUMIN/GLOB SERPL: 1.8 G/DL
ALP SERPL-CCNC: 47 U/L (ref 39–117)
ALT SERPL W P-5'-P-CCNC: 35 U/L (ref 1–33)
ANION GAP SERPL CALCULATED.3IONS-SCNC: 12.1 MMOL/L (ref 5–15)
AST SERPL-CCNC: 28 U/L (ref 1–32)
BILIRUB SERPL-MCNC: 0.4 MG/DL (ref 0–1.2)
BUN SERPL-MCNC: 17 MG/DL (ref 8–23)
BUN/CREAT SERPL: 15.2 (ref 7–25)
CALCIUM SPEC-SCNC: 9.6 MG/DL (ref 8.6–10.5)
CHLORIDE SERPL-SCNC: 105 MMOL/L (ref 98–107)
CHOLEST SERPL-MCNC: 151 MG/DL (ref 0–200)
CO2 SERPL-SCNC: 22.9 MMOL/L (ref 22–29)
CREAT SERPL-MCNC: 1.12 MG/DL (ref 0.57–1)
CREAT UR-MCNC: 83.9 MG/DL
GFR SERPL CREATININE-BSD FRML MDRD: 48 ML/MIN/1.73
GLOBULIN UR ELPH-MCNC: 2.5 GM/DL
GLUCOSE SERPL-MCNC: 120 MG/DL (ref 65–99)
HBA1C MFR BLD: 8.3 % (ref 3.5–5.6)
HDLC SERPL-MCNC: 32 MG/DL (ref 40–60)
LDLC SERPL CALC-MCNC: 84 MG/DL (ref 0–100)
LDLC/HDLC SERPL: 2.43 {RATIO}
MICROALBUMIN/CREAT UR: NORMAL MG/G{CREAT}
POTASSIUM SERPL-SCNC: 3.6 MMOL/L (ref 3.5–5.2)
PROT SERPL-MCNC: 7 G/DL (ref 6–8.5)
SODIUM SERPL-SCNC: 140 MMOL/L (ref 136–145)
TRIGL SERPL-MCNC: 206 MG/DL (ref 0–150)
TSH SERPL DL<=0.05 MIU/L-ACNC: 3.8 UIU/ML (ref 0.27–4.2)
VLDLC SERPL-MCNC: 35 MG/DL (ref 5–40)

## 2021-06-14 PROCEDURE — 80053 COMPREHEN METABOLIC PANEL: CPT

## 2021-06-14 PROCEDURE — 36415 COLL VENOUS BLD VENIPUNCTURE: CPT

## 2021-06-14 PROCEDURE — 82570 ASSAY OF URINE CREATININE: CPT

## 2021-06-14 PROCEDURE — 82306 VITAMIN D 25 HYDROXY: CPT

## 2021-06-14 PROCEDURE — 84443 ASSAY THYROID STIM HORMONE: CPT

## 2021-06-14 PROCEDURE — 80061 LIPID PANEL: CPT

## 2021-06-14 PROCEDURE — 83036 HEMOGLOBIN GLYCOSYLATED A1C: CPT

## 2021-06-14 PROCEDURE — 82043 UR ALBUMIN QUANTITATIVE: CPT

## 2021-06-21 ENCOUNTER — OFFICE VISIT (OUTPATIENT)
Dept: ENDOCRINOLOGY | Facility: CLINIC | Age: 71
End: 2021-06-21

## 2021-06-21 VITALS
HEART RATE: 84 BPM | TEMPERATURE: 97.1 F | HEIGHT: 62 IN | WEIGHT: 144 LBS | DIASTOLIC BLOOD PRESSURE: 60 MMHG | SYSTOLIC BLOOD PRESSURE: 116 MMHG | BODY MASS INDEX: 26.5 KG/M2 | OXYGEN SATURATION: 97 %

## 2021-06-21 DIAGNOSIS — E55.9 VITAMIN D DEFICIENCY: ICD-10-CM

## 2021-06-21 DIAGNOSIS — E78.2 MIXED HYPERLIPIDEMIA: ICD-10-CM

## 2021-06-21 DIAGNOSIS — E11.42 TYPE 2 DIABETES MELLITUS WITH DIABETIC POLYNEUROPATHY, WITH LONG-TERM CURRENT USE OF INSULIN (HCC): Primary | ICD-10-CM

## 2021-06-21 DIAGNOSIS — Z79.4 TYPE 2 DIABETES MELLITUS WITH DIABETIC POLYNEUROPATHY, WITH LONG-TERM CURRENT USE OF INSULIN (HCC): Primary | ICD-10-CM

## 2021-06-21 LAB — GLUCOSE BLDC GLUCOMTR-MCNC: 176 MG/DL (ref 70–105)

## 2021-06-21 PROCEDURE — 82962 GLUCOSE BLOOD TEST: CPT | Performed by: INTERNAL MEDICINE

## 2021-06-21 PROCEDURE — 99214 OFFICE O/P EST MOD 30 MIN: CPT | Performed by: INTERNAL MEDICINE

## 2021-06-21 RX ORDER — METFORMIN HYDROCHLORIDE 500 MG/1
TABLET, EXTENDED RELEASE ORAL
Qty: 180 TABLET | Refills: 3 | Status: SHIPPED | OUTPATIENT
Start: 2021-06-21 | End: 2022-08-08

## 2021-07-03 NOTE — PROGRESS NOTES
[facility]        Patient Care Team:  Sissy Herrera DO as PCP - General (Family Medicine)    Chief Complaint:    Chief Complaint   Patient presents with   • Diabetes     Type 2,  fasting         Subjective   Here for diabetes f/u  Blood sugars: higher in the evening  Exercise program: walking  Taking vit D    Interval History:     Patient Complaints: diarrhea  Patient Denies:  hypoglycemia  History taken from: patient    Review of Systems:   Review of Systems   Eyes: Negative for blurred vision.   Gastrointestinal: Positive for diarrhea. Negative for nausea.   Endocrine: Negative for polyuria.   Neurological: Negative for headache.     Gained 1 lb since last visit    Objective     Vital Signs      Vitals:    06/21/21 1055   BP: 116/60   Pulse: 84   Temp: 97.1 °F (36.2 °C)   SpO2: 97%         Physical Exam:     General Appearance:    Alert, cooperative, in no acute distress   Head:    Normocephalic, without obvious abnormality, atraumatic   Eyes:            Lids and lashes normal, conjunctivae and sclerae normal, no   icterus, no pallor, corneas clear, PERRLA   Throat:   No oral lesions,  oral mucosa moist   Neck:   36 cm in circumference, thyroid firm, no   carotid bruit   Lungs:     Clear    Heart:    Regular rhythm and normal rate   Chest Wall:    No abnormalities observed   Abdomen:     Normal bowel sounds, soft                 Extremities:   Moves all extremities well, no edema               Pulses:   Pulses palpable and equal bilaterally   Skin:   Dry   Neurologic:  DTR absent in ankles, vibratory sense 90% decreased in toes, able to feel the 10g monofilament (better than 1y ago)          Results Review:    I have reviewed the patient's new clinical results, labs & imaging.    Medication Review:   Prior to Admission medications    Medication Sig Start Date End Date Taking? Authorizing Provider   Ascorbic Acid (VITAMIN C PO) VITAMIN C 4/10/18  Yes Provider, MD Ros   aspirin  81 MG EC tablet Take 81 mg by mouth Daily.   Yes Ros Shah MD   atorvastatin (LIPITOR) 40 MG tablet Take 40 mg by mouth Daily. 4/19/19  Yes Ros Shah MD   benazepril (LOTENSIN) 10 MG tablet Take 10 mg by mouth Daily. 4/30/20  Yes Ros Shah MD   BIOTIN PO BIOTIN 4/10/18  Yes Ros Shah MD   Cholecalciferol 1000 units capsule VITAMIN D 1000 UNIT ORAL CAPS 3/31/15  Yes Ros Shah MD   CINNAMON PO CINNAMON CAPS 4/10/18  Yes Ros Shah MD   Cyanocobalamin (B-12 PO) B-12 4/10/18  Yes Ros Shah MD   diltiaZEM (TIAZAC) 120 MG 24 hr capsule Take 120 mg by mouth Daily. 6/2/19  Yes Ros Shah MD   docusate sodium (COLACE) 100 MG capsule Take 100 mg by mouth 2 (Two) Times a Day.   Yes Ros Shah MD   doxylamine (UNISOM) 25 MG tablet Take 25 mg by mouth At Night As Needed for Sleep.   Yes Ros Shah MD   glimepiride (AMARYL) 4 MG tablet Take 4 mg by mouth 2 (Two) Times a Day. 4/19/19  Yes Ros Shah MD   LYRICA 150 MG capsule Take 150 mg by mouth 2 (Two) Times a Day. 6/19/19  Yes Ros Shah MD   NOVOFINE PLUS 32G X 4 MM misc USE DAILY TO INJECT INSULIN. 7/2/19  Yes Ros Shah MD   Omega-3 Fatty Acids (FISH OIL PO) FISH OIL CAPS 4/11/17  Yes ProviderRos MD   ONE TOUCH ULTRA TEST test strip USE 1 STRIP EVERY 8 HOURS. E11.9 6/19/19  Yes Ros Shah MD TRESIBA FLEXTOUCH 200 UNIT/ML solution pen-injector Inject 20 Units under the skin into the appropriate area as directed Daily. 4/23/19  Yes Ros Shah MD   metFORMIN ER (GLUCOPHAGE-XR) 500 MG 24 hr tablet Take one tab ac breakfast & supper 6/21/21   Vic Cerna MD       Lab Results (most recent)     Procedure Component Value Units Date/Time    POC Glucose [[id number]]  (Abnormal) Collected: 06/21/21 1059    Specimen: Blood Updated: 06/21/21 1100     Glucose 176 mg/dL      Comment: Serial Number:  [id number]:  [id number]           Lab Results   Component Value Date    HGBA1C 8.3 (H) 06/14/2021    HGBA1C 8.6 (H) 12/08/2020      Lab Results   Component Value Date    GLUCOSE 120 (H) 06/14/2021    BUN 17 06/14/2021    CREATININE 1.12 (H) 06/14/2021    EGFRIFNONA 48 (L) 06/14/2021    BCR 15.2 06/14/2021    K 3.6 06/14/2021    CO2 22.9 06/14/2021    CALCIUM 9.6 06/14/2021    ALBUMIN 4.50 06/14/2021    LABIL2 1.4 07/05/2018    AST 28 06/14/2021    ALT 35 (H) 06/14/2021    CHOL 151 06/14/2021    LDL 84 06/14/2021    HDL 32 (L) 06/14/2021    TRIG 206 (H) 06/14/2021     Lab Results   Component Value Date    TSH 3.800 06/14/2021    [id number] 57.0 06/14/2021     Microalb/cr ratio <1    Assessment/Plan     Diagnoses and all orders for this visit:    1. Type 2 diabetes mellitus with diabetic polyneuropathy, with long-term current use of insulin (CMS/[facility]) (Primary)  -     metFORMIN ER (GLUCOPHAGE-XR) 500 MG 24 hr tablet; Take one tab ac breakfast & supper  Dispense: 180 tablet; Refill: 3  -     Hemoglobin A1c; Future    2. Vitamin D deficiency    3. Mixed hyperlipidemia  -     Comprehensive Metabolic Panel; Future    Other orders  -     POC Glucose    Glucose control improved. Lipids & vit D stable.    See eye doctor in October.  Increase exercise as able.  Increase Tresiba to 20 units daily.  Decrease metformin to one tab 2 x / d & change to ER formulation.  Continue other diabetes & chol meds & vit D supplements.  Call if blood sugars are running under 100 or over 200.        Vic Cerna MD  07/02/21  23:10 EDT

## 2021-07-21 ENCOUNTER — ON CAMPUS - OUTPATIENT (AMBULATORY)
Dept: URBAN - METROPOLITAN AREA HOSPITAL 2 | Facility: HOSPITAL | Age: 71
End: 2021-07-21
Payer: MEDICARE

## 2021-07-21 ENCOUNTER — OFFICE (AMBULATORY)
Dept: URBAN - METROPOLITAN AREA PATHOLOGY 4 | Facility: PATHOLOGY | Age: 71
End: 2021-07-21
Payer: COMMERCIAL

## 2021-07-21 ENCOUNTER — OFFICE (AMBULATORY)
Dept: URBAN - METROPOLITAN AREA PATHOLOGY 4 | Facility: PATHOLOGY | Age: 71
End: 2021-07-21

## 2021-07-21 VITALS
OXYGEN SATURATION: 97 % | OXYGEN SATURATION: 98 % | SYSTOLIC BLOOD PRESSURE: 141 MMHG | DIASTOLIC BLOOD PRESSURE: 74 MMHG | SYSTOLIC BLOOD PRESSURE: 89 MMHG | SYSTOLIC BLOOD PRESSURE: 159 MMHG | SYSTOLIC BLOOD PRESSURE: 118 MMHG | TEMPERATURE: 96.9 F | HEART RATE: 75 BPM | SYSTOLIC BLOOD PRESSURE: 146 MMHG | WEIGHT: 146 LBS | DIASTOLIC BLOOD PRESSURE: 36 MMHG | HEART RATE: 74 BPM | HEART RATE: 80 BPM | DIASTOLIC BLOOD PRESSURE: 50 MMHG | SYSTOLIC BLOOD PRESSURE: 164 MMHG | HEART RATE: 86 BPM | SYSTOLIC BLOOD PRESSURE: 117 MMHG | HEART RATE: 79 BPM | OXYGEN SATURATION: 99 % | SYSTOLIC BLOOD PRESSURE: 91 MMHG | DIASTOLIC BLOOD PRESSURE: 46 MMHG | DIASTOLIC BLOOD PRESSURE: 111 MMHG | HEART RATE: 96 BPM | RESPIRATION RATE: 18 BRPM | SYSTOLIC BLOOD PRESSURE: 85 MMHG | HEART RATE: 70 BPM | HEIGHT: 62 IN | DIASTOLIC BLOOD PRESSURE: 65 MMHG | RESPIRATION RATE: 16 BRPM | DIASTOLIC BLOOD PRESSURE: 55 MMHG | HEART RATE: 71 BPM | OXYGEN SATURATION: 95 % | SYSTOLIC BLOOD PRESSURE: 104 MMHG | DIASTOLIC BLOOD PRESSURE: 64 MMHG

## 2021-07-21 DIAGNOSIS — D12.4 BENIGN NEOPLASM OF DESCENDING COLON: ICD-10-CM

## 2021-07-21 DIAGNOSIS — D12.5 BENIGN NEOPLASM OF SIGMOID COLON: ICD-10-CM

## 2021-07-21 DIAGNOSIS — D12.2 BENIGN NEOPLASM OF ASCENDING COLON: ICD-10-CM

## 2021-07-21 DIAGNOSIS — K57.30 DIVERTICULOSIS OF LARGE INTESTINE WITHOUT PERFORATION OR ABS: ICD-10-CM

## 2021-07-21 DIAGNOSIS — K64.1 SECOND DEGREE HEMORRHOIDS: ICD-10-CM

## 2021-07-21 DIAGNOSIS — Z12.11 ENCOUNTER FOR SCREENING FOR MALIGNANT NEOPLASM OF COLON: ICD-10-CM

## 2021-07-21 PROBLEM — K63.5 POLYP OF COLON: Status: ACTIVE | Noted: 2021-07-21

## 2021-07-21 LAB
GI HISTOLOGY: A. UNSPECIFIED: (no result)
GI HISTOLOGY: B. UNSPECIFIED: (no result)
GI HISTOLOGY: C. UNSPECIFIED: (no result)
GI HISTOLOGY: PDF REPORT: (no result)

## 2021-07-21 PROCEDURE — 45385 COLONOSCOPY W/LESION REMOVAL: CPT | Mod: PT | Performed by: INTERNAL MEDICINE

## 2021-07-21 PROCEDURE — 45380 COLONOSCOPY AND BIOPSY: CPT | Mod: 59,PT | Performed by: INTERNAL MEDICINE

## 2021-07-21 PROCEDURE — 88305 TISSUE EXAM BY PATHOLOGIST: CPT | Mod: 26 | Performed by: INTERNAL MEDICINE

## 2021-08-23 ENCOUNTER — HOSPITAL ENCOUNTER (OUTPATIENT)
Dept: BONE DENSITY | Facility: HOSPITAL | Age: 71
Discharge: HOME | End: 2021-08-23
Admitting: FAMILY MEDICINE

## 2021-08-23 DIAGNOSIS — N95.1 SYMPTOMATIC MENOPAUSAL OR FEMALE CLIMACTERIC STATES: ICD-10-CM

## 2021-08-23 PROCEDURE — 77080 DXA BONE DENSITY AXIAL: CPT

## 2021-12-22 ENCOUNTER — LAB (OUTPATIENT)
Dept: LAB | Facility: HOSPITAL | Age: 71
End: 2021-12-22

## 2021-12-22 DIAGNOSIS — Z79.4 TYPE 2 DIABETES MELLITUS WITH DIABETIC POLYNEUROPATHY, WITH LONG-TERM CURRENT USE OF INSULIN (HCC): ICD-10-CM

## 2021-12-22 DIAGNOSIS — E78.2 MIXED HYPERLIPIDEMIA: ICD-10-CM

## 2021-12-22 DIAGNOSIS — E11.42 TYPE 2 DIABETES MELLITUS WITH DIABETIC POLYNEUROPATHY, WITH LONG-TERM CURRENT USE OF INSULIN (HCC): ICD-10-CM

## 2021-12-22 LAB
ALBUMIN SERPL-MCNC: 4.8 G/DL (ref 3.5–5.2)
ALBUMIN/GLOB SERPL: 1.9 G/DL
ALP SERPL-CCNC: 58 U/L (ref 39–117)
ALT SERPL W P-5'-P-CCNC: 21 U/L (ref 1–33)
ANION GAP SERPL CALCULATED.3IONS-SCNC: 14.9 MMOL/L (ref 5–15)
AST SERPL-CCNC: 21 U/L (ref 1–32)
BILIRUB SERPL-MCNC: 0.3 MG/DL (ref 0–1.2)
BUN SERPL-MCNC: 15 MG/DL (ref 8–23)
BUN/CREAT SERPL: 14 (ref 7–25)
CALCIUM SPEC-SCNC: 9.3 MG/DL (ref 8.6–10.5)
CHLORIDE SERPL-SCNC: 102 MMOL/L (ref 98–107)
CO2 SERPL-SCNC: 24.1 MMOL/L (ref 22–29)
CREAT SERPL-MCNC: 1.07 MG/DL (ref 0.57–1)
GFR SERPL CREATININE-BSD FRML MDRD: 50 ML/MIN/1.73
GLOBULIN UR ELPH-MCNC: 2.5 GM/DL
GLUCOSE SERPL-MCNC: 141 MG/DL (ref 65–99)
HBA1C MFR BLD: 8.3 % (ref 3.5–5.6)
POTASSIUM SERPL-SCNC: 3.6 MMOL/L (ref 3.5–5.2)
PROT SERPL-MCNC: 7.3 G/DL (ref 6–8.5)
SODIUM SERPL-SCNC: 141 MMOL/L (ref 136–145)

## 2021-12-22 PROCEDURE — 83036 HEMOGLOBIN GLYCOSYLATED A1C: CPT

## 2021-12-22 PROCEDURE — 36415 COLL VENOUS BLD VENIPUNCTURE: CPT

## 2021-12-22 PROCEDURE — 80053 COMPREHEN METABOLIC PANEL: CPT

## 2021-12-29 ENCOUNTER — OFFICE VISIT (OUTPATIENT)
Dept: ENDOCRINOLOGY | Facility: CLINIC | Age: 71
End: 2021-12-29

## 2021-12-29 VITALS
HEIGHT: 62 IN | DIASTOLIC BLOOD PRESSURE: 80 MMHG | HEART RATE: 98 BPM | BODY MASS INDEX: 25.58 KG/M2 | SYSTOLIC BLOOD PRESSURE: 150 MMHG | TEMPERATURE: 97.1 F | WEIGHT: 139 LBS

## 2021-12-29 DIAGNOSIS — E55.9 VITAMIN D DEFICIENCY: ICD-10-CM

## 2021-12-29 DIAGNOSIS — E78.2 MIXED HYPERLIPIDEMIA: ICD-10-CM

## 2021-12-29 DIAGNOSIS — E11.42 TYPE 2 DIABETES MELLITUS WITH DIABETIC POLYNEUROPATHY, WITH LONG-TERM CURRENT USE OF INSULIN (HCC): Primary | ICD-10-CM

## 2021-12-29 DIAGNOSIS — Z79.4 TYPE 2 DIABETES MELLITUS WITH DIABETIC POLYNEUROPATHY, WITH LONG-TERM CURRENT USE OF INSULIN (HCC): Primary | ICD-10-CM

## 2021-12-29 LAB — GLUCOSE BLDC GLUCOMTR-MCNC: 167 MG/DL (ref 70–105)

## 2021-12-29 PROCEDURE — 82962 GLUCOSE BLOOD TEST: CPT | Performed by: INTERNAL MEDICINE

## 2021-12-29 PROCEDURE — 99214 OFFICE O/P EST MOD 30 MIN: CPT | Performed by: INTERNAL MEDICINE

## 2021-12-29 RX ORDER — CLOTRIMAZOLE AND BETAMETHASONE DIPROPIONATE 10; .64 MG/G; MG/G
CREAM TOPICAL
COMMUNITY
Start: 2021-12-26

## 2021-12-29 RX ORDER — TRIAMCINOLONE ACETONIDE 5 MG/G
CREAM TOPICAL
COMMUNITY
Start: 2021-10-14 | End: 2023-03-06

## 2021-12-29 RX ORDER — DILTIAZEM HYDROCHLORIDE 120 MG/1
TABLET, FILM COATED ORAL
COMMUNITY
Start: 2021-10-30 | End: 2022-02-07 | Stop reason: SDUPTHER

## 2021-12-29 NOTE — PATIENT INSTRUCTIONS
Continue exercise as able.  Decrease salt intake.  Continue metformin, atorvastatin, fish oil & vit D supplements.  Increase Tresiba to 22 units daily.  Call if blood sugars are running under 100 or over 200.  F/u in 6 months, with fasting labs prior.

## 2022-01-24 ENCOUNTER — OFFICE VISIT (OUTPATIENT)
Dept: FAMILY MEDICINE CLINIC | Facility: CLINIC | Age: 72
End: 2022-01-24

## 2022-01-24 VITALS
SYSTOLIC BLOOD PRESSURE: 155 MMHG | WEIGHT: 139.2 LBS | BODY MASS INDEX: 25.62 KG/M2 | HEIGHT: 62 IN | TEMPERATURE: 97.8 F | RESPIRATION RATE: 15 BRPM | HEART RATE: 88 BPM | DIASTOLIC BLOOD PRESSURE: 65 MMHG | OXYGEN SATURATION: 97 %

## 2022-01-24 DIAGNOSIS — I10 PRIMARY HYPERTENSION: ICD-10-CM

## 2022-01-24 DIAGNOSIS — E11.42 DIABETIC PERIPHERAL NEUROPATHY: Primary | ICD-10-CM

## 2022-01-24 DIAGNOSIS — E78.2 MIXED HYPERLIPIDEMIA: ICD-10-CM

## 2022-01-24 DIAGNOSIS — Z79.899 ENCOUNTER FOR LONG-TERM (CURRENT) USE OF OTHER MEDICATIONS: ICD-10-CM

## 2022-01-24 DIAGNOSIS — Z79.4 TYPE 2 DIABETES MELLITUS WITH DIABETIC POLYNEUROPATHY, WITH LONG-TERM CURRENT USE OF INSULIN: ICD-10-CM

## 2022-01-24 DIAGNOSIS — E11.42 TYPE 2 DIABETES MELLITUS WITH DIABETIC POLYNEUROPATHY, WITH LONG-TERM CURRENT USE OF INSULIN: ICD-10-CM

## 2022-01-24 PROCEDURE — 99204 OFFICE O/P NEW MOD 45 MIN: CPT | Performed by: FAMILY MEDICINE

## 2022-01-24 NOTE — PROGRESS NOTES
Chief Complaint  Hypertension, Insomnia, and Peripheral Neuropathy     History of Present Illness    Gi Velez presents today to establish care.  She states she is transferring from Dr. Giron then Dr Herrera in [place] who has now retired.  Patient states that she has HTN and does check her blood pressure  and takes her medication everyday.  Home blood pressure readings range from 114//77 patient would also like to talk about insomnia. Patient states that she normally sleeps less then 2 hours a night. The patient also has neuropathy and has been on the same medication LYRICA 150 MG capsule twice daily for about ten years.  She sees Dr. Tom Cerna for diabetes, every 6 months.   Eye doctor Dr Nation at [facility] for diabetic retinopathy  She sees Dr. Sellers for ITP / thrombocytopenia, follow up on Thursday  She sees Dr. Fernandez for diabetic foot care.  No longer see a cardiologist, has seen Dr Noyola many years ago,  had occasional palpitations and occasional tachycardia. Cath years ago was negative  No longer see neurology, Dr Harris, who origionally started her on Lyrica for diabetic neuropathy.  Became confused on higher dose when tried to increase.   Have had covid and flu vaccinations, have been taking elderberry and have not had covid.     Go to [place] 3 weeks at a time several times a year, more active, eat better. Going next week.    Patient is adopted, does not know parents family history.  Daughter has fibromyalgia, son no medical problems    Current Outpatient Medications on File Prior to Visit   Medication Sig   • Ascorbic Acid (VITAMIN C PO) VITAMIN C   • aspirin 81 MG EC tablet Take 81 mg by mouth Daily.   • atorvastatin (LIPITOR) 40 MG tablet Take 40 mg by mouth Daily.   • benazepril (LOTENSIN) 10 MG tablet Take 10 mg by mouth Daily.   • BIOTIN PO BIOTIN   • Cholecalciferol 1000 units capsule VITAMIN D 1000 UNIT ORAL CAPS   • CINNAMON PO CINNAMON CAPS   •  "clotrimazole-betamethasone (LOTRISONE) 1-0.05 % cream    • Cyanocobalamin (B-12 PO) B-12   • dilTIAZem (CARDIZEM) 120 MG tablet    • diltiaZEM (TIAZAC) 120 MG 24 hr capsule Take 120 mg by mouth Daily.   • docusate sodium (COLACE) 100 MG capsule Take 100 mg by mouth 2 (Two) Times a Day.   • doxylamine (UNISOM) 25 MG tablet Take 25 mg by mouth At Night As Needed for Sleep.   • glimepiride (AMARYL) 4 MG tablet Take 4 mg by mouth 2 (Two) Times a Day.   • metFORMIN ER (GLUCOPHAGE-XR) 500 MG 24 hr tablet Take one tab ac breakfast & supper   • NOVOFINE PLUS 32G X 4 MM misc USE DAILY TO INJECT INSULIN.   • Omega-3 Fatty Acids (FISH OIL PO) FISH OIL CAPS   • ONE TOUCH ULTRA TEST test strip USE 1 STRIP EVERY 8 HOURS. E11.9   • TRESIBA FLEXTOUCH 200 UNIT/ML solution pen-injector Inject 20 Units under the skin into the appropriate area as directed Daily.   • triamcinolone (KENALOG) 0.5 % cream    • [DISCONTINUED] LYRICA 150 MG capsule Take 150 mg by mouth 2 (Two) Times a Day.     No current facility-administered medications on file prior to visit.       Objective   Vital Signs:   /65   Pulse 88   Temp 97.8 °F (36.6 °C) (Temporal)   Resp 15   Ht 157.5 cm (62\")   Wt 63.1 kg (139 lb 3.2 oz)   SpO2 97%   BMI 25.46 kg/m²     Recheck blood pressure right arm sitting 160/80  Physical Exam  Vitals and nursing note reviewed.   Constitutional:       General: She is not in acute distress.     Appearance: She is well-developed.   HENT:      Head: Normocephalic and atraumatic.   Cardiovascular:      Rate and Rhythm: Normal rate and regular rhythm.      Heart sounds: No murmur heard.      Pulmonary:      Effort: Pulmonary effort is normal.      Breath sounds: Normal breath sounds. No wheezing.   Musculoskeletal:         General: Normal range of motion.   Skin:     General: Skin is warm and dry.      Findings: No rash.   Neurological:      Mental Status: She is alert and oriented to person, place, and time.            No visits " with results within 1 Day(s) from this visit.   Latest known visit with results is:   Office Visit on 12/29/2021   Component Date Value Ref Range Status   • Glucose 12/29/2021 167* 70 - 105 mg/dL Final    Serial Number: [id number]:  [id number]       Lab Results   Component Value Date    BUN 15 12/22/2021    CREATININE 1.07 (H) 12/22/2021    EGFRIFNONA 50 (L) 12/22/2021     12/22/2021    K 3.6 12/22/2021     12/22/2021    CALCIUM 9.3 12/22/2021    ALBUMIN 4.80 12/22/2021    BILITOT 0.3 12/22/2021    ALKPHOS 58 12/22/2021    AST 21 12/22/2021    ALT 21 12/22/2021        Lab Results   Component Value Date    HGBA1C 8.3 (H) 12/22/2021    HGBA1C 8.3 (H) 06/14/2021    HGBA1C 8.6 (H) 12/08/2020                Assessment and Plan    Diagnoses and all orders for this visit:    1. Diabetic peripheral neuropathy (HCC) (Primary)  -     Lyrica 150 MG capsule; Take 1 capsule by mouth 2 (Two) Times a Day.  Dispense: 180 capsule; Refill: 1    2. Primary hypertension    3. Type 2 diabetes mellitus with diabetic polyneuropathy, with long-term current use of insulin (HCC)    4. Mixed hyperlipidemia    5. Encounter for long-term (current) use of other medications      Diabetic neuropathy adequately controlled with Lyrica 150 mg twice daily.  Continuing    Type 2 diabetes, followed by endocrinology continue current medications.    Hypertension, elevated today and on previous appointment with Dr. Cerna however home readings are mostly below 130/80 with fairly common readings in the 110's will not increase blood pressure medications today as it could cause symptomatic hypotension.  Did stress DASH diet, increase physical activity, and weight reduction.  If remaining elevated at return visit would consider increasing benazepril or diltiazem.      Medications Discontinued During This Encounter   Medication Reason   • LYRICA 150 MG capsule Reorder         Follow Up     Return in about 6 weeks (around 3/7/2022) for  Annual physical and HTN.    Patient was given instructions and counseling regarding her condition or for health maintenance advice. Please see specific information pulled into the AVS if appropriate.

## 2022-01-24 NOTE — PATIENT INSTRUCTIONS
"https://www.nhlbi.nih.gov/files/docs/public/heart/dash_brief.pdf\">   DASH Eating Plan  DASH stands for Dietary Approaches to Stop Hypertension. The DASH eating plan is a healthy eating plan that has been shown to:  · Reduce high blood pressure (hypertension).  · Reduce your risk for type 2 diabetes, heart disease, and stroke.  · Help with weight loss.  What are tips for following this plan?  Reading food labels  · Check food labels for the amount of salt (sodium) per serving. Choose foods with less than 5 percent of the Daily Value of sodium. Generally, foods with less than 300 milligrams (mg) of sodium per serving fit into this eating plan.  · To find whole grains, look for the word \"whole\" as the first word in the ingredient list.  Shopping  · Buy products labeled as \"low-sodium\" or \"no salt added.\"  · Buy fresh foods. Avoid canned foods and pre-made or frozen meals.  Cooking  · Avoid adding salt when cooking. Use salt-free seasonings or herbs instead of table salt or sea salt. Check with your health care provider or pharmacist before using salt substitutes.  · Do not michelle foods. Cook foods using healthy methods such as baking, boiling, grilling, roasting, and broiling instead.  · Cook with heart-healthy oils, such as olive, canola, avocado, soybean, or sunflower oil.  Meal planning    · Eat a balanced diet that includes:  ? 4 or more servings of fruits and 4 or more servings of vegetables each day. Try to fill one-half of your plate with fruits and vegetables.  ? 6-8 servings of whole grains each day.  ? Less than 6 oz (170 g) of lean meat, poultry, or fish each day. A 3-oz (85-g) serving of meat is about the same size as a deck of cards. One egg equals 1 oz (28 g).  ? 2-3 servings of low-fat dairy each day. One serving is 1 cup (237 mL).  ? 1 serving of nuts, seeds, or beans 5 times each week.  ? 2-3 servings of heart-healthy fats. Healthy fats called omega-3 fatty acids are found in foods such as walnuts, " flaxseeds, fortified milks, and eggs. These fats are also found in cold-water fish, such as sardines, salmon, and mackerel.  · Limit how much you eat of:  ? Canned or prepackaged foods.  ? Food that is high in trans fat, such as some fried foods.  ? Food that is high in saturated fat, such as fatty meat.  ? Desserts and other sweets, sugary drinks, and other foods with added sugar.  ? Full-fat dairy products.  · Do not salt foods before eating.  · Do not eat more than 4 egg yolks a week.  · Try to eat at least 2 vegetarian meals a week.  · Eat more home-cooked food and less restaurant, buffet, and fast food.    Lifestyle  · When eating at a restaurant, ask that your food be prepared with less salt or no salt, if possible.  · If you drink alcohol:  ? Limit how much you use to:  § 0-1 drink a day for women who are not pregnant.  § 0-2 drinks a day for men.  ? Be aware of how much alcohol is in your drink. In the [place] one drink equals one 12 oz bottle of beer (355 mL), one 5 oz glass of wine (148 mL), or one 1½ oz glass of hard liquor (44 mL).  General information  · Avoid eating more than 2,300 mg of salt a day. If you have hypertension, you may need to reduce your sodium intake to 1,500 mg a day.  · Work with your health care provider to maintain a healthy body weight or to lose weight. Ask what an ideal weight is for you.  · Get at least 30 minutes of exercise that causes your heart to beat faster (aerobic exercise) most days of the week. Activities may include walking, swimming, or biking.  · Work with your health care provider or dietitian to adjust your eating plan to your individual calorie needs.  What foods should I eat?  Fruits  All fresh, dried, or frozen fruit. Canned fruit in natural juice (without added sugar).  Vegetables  Fresh or frozen vegetables (raw, steamed, roasted, or grilled). Low-sodium or reduced-sodium tomato and vegetable juice. Low-sodium or reduced-sodium tomato sauce and tomato paste.  Low-sodium or reduced-sodium canned vegetables.  Grains  Whole-grain or whole-wheat bread. Whole-grain or whole-wheat pasta. Brown rice. Oatmeal. Quinoa. Bulgur. Whole-grain and low-sodium cereals. Lashawn bread. Low-fat, low-sodium crackers. Whole-wheat flour tortillas.  Meats and other proteins  Skinless chicken or turkey. Ground chicken or turkey. Pork with fat trimmed off. Fish and seafood. Egg whites. Dried beans, peas, or lentils. Unsalted nuts, nut butters, and seeds. Unsalted canned beans. Lean cuts of beef with fat trimmed off. Low-sodium, lean precooked or cured meat, such as sausages or meat loaves.  Dairy  Low-fat (1%) or fat-free (skim) milk. Reduced-fat, low-fat, or fat-free cheeses. Nonfat, low-sodium ricotta or cottage cheese. Low-fat or nonfat yogurt. Low-fat, low-sodium cheese.  Fats and oils  Soft margarine without trans fats. Vegetable oil. Reduced-fat, low-fat, or light mayonnaise and salad dressings (reduced-sodium). Canola, safflower, olive, avocado, soybean, and sunflower oils. Avocado.  Seasonings and condiments  Herbs. Spices. Seasoning mixes without salt.  Other foods  Unsalted popcorn and pretzels. Fat-free sweets.  The items listed above may not be a complete list of foods and beverages you can eat. Contact a dietitian for more information.  What foods should I avoid?  Fruits  Canned fruit in a light or heavy syrup. Fried fruit. Fruit in cream or butter sauce.  Vegetables  Creamed or fried vegetables. Vegetables in a cheese sauce. Regular canned vegetables (not low-sodium or reduced-sodium). Regular canned tomato sauce and paste (not low-sodium or reduced-sodium). Regular tomato and vegetable juice (not low-sodium or reduced-sodium). Pickles. Olives.  Grains  Baked goods made with fat, such as croissants, muffins, or some breads. Dry pasta or rice meal packs.  Meats and other proteins  Fatty cuts of meat. Ribs. Fried meat. [place]. Bologna, salami, and other precooked or cured meats, such as  sausages or meat loaves. Fat from the back of a pig (fatback). Bratwurst. Salted nuts and seeds. Canned beans with added salt. Canned or smoked fish. Whole eggs or egg yolks. Chicken or turkey with skin.  Dairy  Whole or 2% milk, cream, and half-and-half. Whole or full-fat cream cheese. Whole-fat or sweetened yogurt. Full-fat cheese. Nondairy creamers. Whipped toppings. Processed cheese and cheese spreads.  Fats and oils  Butter. Stick margarine. Lard. Shortening. Ghee. [place]oud fat. Tropical oils, such as coconut, palm kernel, or palm oil.  Seasonings and condiments  Onion salt, garlic salt, seasoned salt, table salt, and sea salt. Worcestershire sauce. Tartar sauce. Barbecue sauce. Teriyaki sauce. Soy sauce, including reduced-sodium. Steak sauce. Canned and packaged gravies. Fish sauce. Oyster sauce. Cocktail sauce. Store-bought horseradish. Ketchup. Mustard. Meat flavorings and tenderizers. Bouillon cubes. Hot sauces. Pre-made or packaged marinades. Pre-made or packaged taco seasonings. Relishes. Regular salad dressings.  Other foods  Salted popcorn and pretzels.  The items listed above may not be a complete list of foods and beverages you should avoid. Contact a dietitian for more information.  Where to find more information  · National Heart, Lung, and Blood [place]: www.nhlbi.nih.gov  · American Heart Association: www.heart.org  · Academy of Nutrition and Dietetics: www.eatright.org  · National Kidney Foundation: www.kidney.org  Summary  · The DASH eating plan is a healthy eating plan that has been shown to reduce high blood pressure (hypertension). It may also reduce your risk for type 2 diabetes, heart disease, and stroke.  · When on the DASH eating plan, aim to eat more fresh fruits and vegetables, whole grains, lean proteins, low-fat dairy, and heart-healthy fats.  · With the DASH eating plan, you should limit salt (sodium) intake to 2,300 mg a day. If you have hypertension, you may need to reduce your  sodium intake to 1,500 mg a day.  · Work with your health care provider or dietitian to adjust your eating plan to your individual calorie needs.  This information is not intended to replace advice given to you by your health care provider. Make sure you discuss any questions you have with your health care provider.  Document Revised: 11/20/2020 Document Reviewed: 11/20/2020  [employer] Patient Education © 2021 [employer]    Managing Your Hypertension  Hypertension, also called high blood pressure, is when the force of the blood pressing against the walls of the arteries is too strong. Arteries are blood vessels that carry blood from your heart throughout your body. Hypertension forces the heart to work harder to pump blood and may cause the arteries to become narrow or stiff.  Understanding blood pressure readings  Your personal target blood pressure may vary depending on your medical conditions, your age, and other factors. A blood pressure reading includes a higher number over a lower number. Ideally, your blood pressure should be below 120/80. You should know that:  · The first, or top, number is called the systolic pressure. It is a measure of the pressure in your arteries as your heart beats.  · The second, or bottom number, is called the diastolic pressure. It is a measure of the pressure in your arteries as the heart relaxes.  Blood pressure is classified into four stages. Based on your blood pressure reading, your health care provider may use the following stages to determine what type of treatment you need, if any. Systolic pressure and diastolic pressure are measured in a unit called mmHg.  Normal  · Systolic pressure: below 120.  · Diastolic pressure: below 80.  Elevated  · Systolic pressure: 120-129.  · Diastolic pressure: below 80.  Hypertension stage 1  · Systolic pressure: 130-139.  · Diastolic pressure: 80-89.  Hypertension stage 2  · Systolic pressure: 140 or above.  · Diastolic pressure: 90 or  above.  How can this condition affect me?  Managing your hypertension is an important responsibility. Over time, hypertension can damage the arteries and decrease blood flow to important parts of the body, including the brain, heart, and kidneys. Having untreated or uncontrolled hypertension can lead to:  · A heart attack.  · A stroke.  · A weakened blood vessel (aneurysm).  · Heart failure.  · Kidney damage.  · Eye damage.  · Metabolic syndrome.  · Memory and concentration problems.  · Vascular dementia.  What actions can I take to manage this condition?  Hypertension can be managed by making lifestyle changes and possibly by taking medicines. Your health care provider will help you make a plan to bring your blood pressure within a normal range.  Nutrition    · Eat a diet that is high in fiber and potassium, and low in salt (sodium), added sugar, and fat. An example eating plan is called the Dietary Approaches to Stop Hypertension (DASH) diet. To eat this way:  ? Eat plenty of fresh fruits and vegetables. Try to fill one-half of your plate at each meal with fruits and vegetables.  ? Eat whole grains, such as whole-wheat pasta, brown rice, or whole-grain bread. Fill about one-fourth of your plate with whole grains.  ? Eat low-fat dairy products.  ? Avoid fatty cuts of meat, processed or cured meats, and poultry with skin. Fill about one-fourth of your plate with lean proteins such as fish, chicken without skin, beans, eggs, and tofu.  ? Avoid pre-made and processed foods. These tend to be higher in sodium, added sugar, and fat.  · Reduce your daily sodium intake. Most people with hypertension should eat less than 1,500 mg of sodium a day.    Lifestyle    · Work with your health care provider to maintain a healthy body weight or to lose weight. Ask what an ideal weight is for you.  · Get at least 30 minutes of exercise that causes your heart to beat faster (aerobic exercise) most days of the week. Activities may  include walking, swimming, or biking.  · Include exercise to strengthen your muscles (resistance exercise), such as weight lifting, as part of your weekly exercise routine. Try to do these types of exercises for 30 minutes at least 3 days a week.  · Do not use any products that contain nicotine or tobacco, such as cigarettes, e-cigarettes, and chewing tobacco. If you need help quitting, ask your health care provider.  · Control any long-term (chronic) conditions you have, such as high cholesterol or diabetes.  · Identify your sources of stress and find ways to manage stress. This may include meditation, deep breathing, or making time for fun activities.    Alcohol use  · Do not drink alcohol if:  ? Your health care provider tells you not to drink.  ? You are pregnant, may be pregnant, or are planning to become pregnant.  · If you drink alcohol:  ? Limit how much you use to:  § 0-1 drink a day for women.  § 0-2 drinks a day for men.  ? Be aware of how much alcohol is in your drink. In the [place] one drink equals one 12 oz bottle of beer (355 mL), one 5 oz glass of wine (148 mL), or one 1½ oz glass of hard liquor (44 mL).  Medicines  Your health care provider may prescribe medicine if lifestyle changes are not enough to get your blood pressure under control and if:  · Your systolic blood pressure is 130 or higher.  · Your diastolic blood pressure is 80 or higher.  Take medicines only as told by your health care provider. Follow the directions carefully. Blood pressure medicines must be taken as told by your health care provider. The medicine does not work as well when you skip doses. Skipping doses also puts you at risk for problems.  Monitoring  Before you monitor your blood pressure:  · Do not smoke, drink caffeinated beverages, or exercise within 30 minutes before taking a measurement.  · Use the bathroom and empty your bladder (urinate).  · Sit quietly for at least 5 minutes before taking measurements.  Monitor  your blood pressure at home as told by your health care provider. To do this:  · Sit with your back straight and supported.  · Place your feet flat on the floor. Do not cross your legs.  · Support your arm on a flat surface, such as a table. Make sure your upper arm is at heart level.  · Each time you measure, take two or three readings one minute apart and record the results.  You may also need to have your blood pressure checked regularly by your health care provider.  General information  · Talk with your health care provider about your diet, exercise habits, and other lifestyle factors that may be contributing to hypertension.  · Review all the medicines you take with your health care provider because there may be side effects or interactions.  · Keep all visits as told by your health care provider. Your health care provider can help you create and adjust your plan for managing your high blood pressure.  Where to find more information  · National Heart, Lung, and Blood [place]: www.nhlbi.nih.gov  · American Heart Association: www.heart.org  Contact a health care provider if:  · You think you are having a reaction to medicines you have taken.  · You have repeated (recurrent) headaches.  · You feel dizzy.  · You have swelling in your ankles.  · You have trouble with your vision.  Get help right away if:  · You develop a severe headache or confusion.  · You have unusual weakness or numbness, or you feel faint.  · You have severe pain in your chest or abdomen.  · You vomit repeatedly.  · You have trouble breathing.  These symptoms may represent a serious problem that is an emergency. Do not wait to see if the symptoms will go away. Get medical help right away. Call your local emergency services (911 in the [place] Do not drive yourself to the hospital.  Summary  · Hypertension is when the force of blood pumping through your arteries is too strong. If this condition is not controlled, it may put you at risk for  serious complications.  · Your personal target blood pressure may vary depending on your medical conditions, your age, and other factors. For most people, a normal blood pressure is less than 120/80.  · Hypertension is managed by lifestyle changes, medicines, or both.  · Lifestyle changes to help manage hypertension include losing weight, eating a healthy, low-sodium diet, exercising more, stopping smoking, and limiting alcohol.  This information is not intended to replace advice given to you by your health care provider. Make sure you discuss any questions you have with your health care provider.  Document Revised: 01/22/2021 Document Reviewed: 11/17/2020  [employer] Patient Education © 2021 [employer].

## 2022-01-25 ENCOUNTER — TELEPHONE (OUTPATIENT)
Dept: FAMILY MEDICINE CLINIC | Facility: CLINIC | Age: 72
End: 2022-01-25

## 2022-01-25 DIAGNOSIS — E11.42 DIABETIC PERIPHERAL NEUROPATHY: Primary | ICD-10-CM

## 2022-01-25 RX ORDER — PREGABALIN 150 MG/1
150 CAPSULE ORAL 2 TIMES DAILY
Qty: 180 CAPSULE | Refills: 1 | Status: SHIPPED | OUTPATIENT
Start: 2022-01-25 | End: 2022-07-26

## 2022-01-25 NOTE — TELEPHONE ENCOUNTER
Lyrica 150 MG capsule  150 mg, 2 Times Daily 1 ordered         Summary: Take 1 capsule by mouth 2 (Two) Times a Day         PHARMACY  [id number] - [place], [place] - [address] [phone] [facility] [phone] [phone]      PATIENT IS NEEDING THIS TO SAY GENERIC   PREGABALIN     SHE IS OUT OF MEDS AND NEEDS ASAP

## 2022-02-07 ENCOUNTER — LAB REQUISITION (OUTPATIENT)
Dept: LAB | Facility: HOSPITAL | Age: 72
End: 2022-02-07

## 2022-02-07 DIAGNOSIS — R94.5 ABNORMAL RESULTS OF LIVER FUNCTION STUDIES: ICD-10-CM

## 2022-02-07 DIAGNOSIS — I10 PRIMARY HYPERTENSION: Primary | ICD-10-CM

## 2022-02-07 LAB
ALBUMIN SERPL-MCNC: 4.3 G/DL (ref 3.5–5.2)
ALBUMIN/GLOB SERPL: 2 G/DL
ALP SERPL-CCNC: 58 U/L (ref 39–117)
ALT SERPL W P-5'-P-CCNC: 32 U/L (ref 1–33)
ANION GAP SERPL CALCULATED.3IONS-SCNC: 16 MMOL/L (ref 5–15)
AST SERPL-CCNC: 26 U/L (ref 1–32)
BILIRUB SERPL-MCNC: 0.3 MG/DL (ref 0–1.2)
BUN SERPL-MCNC: 16 MG/DL (ref 8–23)
BUN/CREAT SERPL: 16.5 (ref 7–25)
CALCIUM SPEC-SCNC: 9.5 MG/DL (ref 8.6–10.5)
CHLORIDE SERPL-SCNC: 101 MMOL/L (ref 98–107)
CO2 SERPL-SCNC: 22 MMOL/L (ref 22–29)
CREAT SERPL-MCNC: 0.97 MG/DL (ref 0.57–1)
GFR SERPL CREATININE-BSD FRML MDRD: 56 ML/MIN/1.73
GLOBULIN UR ELPH-MCNC: 2.1 GM/DL
GLUCOSE SERPL-MCNC: 343 MG/DL (ref 65–99)
POTASSIUM SERPL-SCNC: 3.9 MMOL/L (ref 3.5–5.2)
PROT SERPL-MCNC: 6.4 G/DL (ref 6–8.5)
SODIUM SERPL-SCNC: 139 MMOL/L (ref 136–145)

## 2022-02-07 PROCEDURE — 80053 COMPREHEN METABOLIC PANEL: CPT | Performed by: INTERNAL MEDICINE

## 2022-02-07 RX ORDER — DILTIAZEM HYDROCHLORIDE 120 MG/1
120 CAPSULE, EXTENDED RELEASE ORAL DAILY
Qty: 30 CAPSULE | Refills: 5 | Status: SHIPPED | OUTPATIENT
Start: 2022-02-07 | End: 2022-08-22 | Stop reason: SDUPTHER

## 2022-02-07 RX ORDER — DILTIAZEM HYDROCHLORIDE 120 MG/1
TABLET, FILM COATED ORAL
Status: CANCELLED | OUTPATIENT
Start: 2022-02-07

## 2022-02-07 NOTE — TELEPHONE ENCOUNTER
Please inform patient I sent in Tiazac, a 24-hour controlled release version of diltiazem which was also on her medication list. To take immediate release only once a day is not appropriate, I have removed this version from her medication list..

## 2022-02-07 NOTE — TELEPHONE ENCOUNTER
Caller: Gi Velez    Relationship: Self    Best call back number: [phone]      Requested Prescriptions:   Requested Prescriptions     Pending Prescriptions Disp Refills   • dilTIAZem (CARDIZEM) 120 MG tablet          Pharmacy where request should be sent: [id number] - [place], [place] - [address] [phone] [facility] [phone]      Additional details provided by patient: HAS LESS THAN 3 DAYS.    Does the patient have less than a 3 day supply:  [x] Yes  [] No    Paige Brandon, PCT   02/07/22 13:16 EST

## 2022-02-08 ENCOUNTER — TRANSCRIBE ORDERS (OUTPATIENT)
Dept: ADMINISTRATIVE | Facility: HOSPITAL | Age: 72
End: 2022-02-08

## 2022-02-08 DIAGNOSIS — Z12.31 VISIT FOR SCREENING MAMMOGRAM: Primary | ICD-10-CM

## 2022-03-07 ENCOUNTER — HOSPITAL ENCOUNTER (OUTPATIENT)
Dept: MAMMOGRAPHY | Facility: HOSPITAL | Age: 72
Discharge: HOME | End: 2022-03-07
Admitting: FAMILY MEDICINE

## 2022-03-07 DIAGNOSIS — Z12.31 VISIT FOR SCREENING MAMMOGRAM: ICD-10-CM

## 2022-03-07 PROCEDURE — 77063 BREAST TOMOSYNTHESIS BI: CPT

## 2022-03-07 PROCEDURE — 77067 SCR MAMMO BI INCL CAD: CPT

## 2022-03-08 ENCOUNTER — TELEPHONE (OUTPATIENT)
Dept: FAMILY MEDICINE CLINIC | Facility: CLINIC | Age: 72
End: 2022-03-08

## 2022-03-08 NOTE — TELEPHONE ENCOUNTER
----- Message from Mercedez Guevara MD sent at 3/7/2022  6:11 PM EST -----  Elsie,  Please inform patient that her mammogram is normal, BI-RADS 1, should repeat in 1 year.  Mercedez Guevara MD

## 2022-04-25 ENCOUNTER — OFFICE VISIT (OUTPATIENT)
Dept: FAMILY MEDICINE CLINIC | Facility: CLINIC | Age: 72
End: 2022-04-25

## 2022-04-25 VITALS
HEART RATE: 100 BPM | OXYGEN SATURATION: 97 % | DIASTOLIC BLOOD PRESSURE: 68 MMHG | SYSTOLIC BLOOD PRESSURE: 138 MMHG | WEIGHT: 142.8 LBS | RESPIRATION RATE: 14 BRPM | TEMPERATURE: 98 F | HEIGHT: 62 IN | BODY MASS INDEX: 26.28 KG/M2

## 2022-04-25 DIAGNOSIS — G47.00 INSOMNIA, UNSPECIFIED TYPE: ICD-10-CM

## 2022-04-25 DIAGNOSIS — R53.82 CHRONIC FATIGUE: ICD-10-CM

## 2022-04-25 DIAGNOSIS — E11.65 TYPE 2 DIABETES MELLITUS WITH HYPERGLYCEMIA, WITH LONG-TERM CURRENT USE OF INSULIN: ICD-10-CM

## 2022-04-25 DIAGNOSIS — D69.6 THROMBOCYTOPENIA: ICD-10-CM

## 2022-04-25 DIAGNOSIS — E61.1 IRON DEFICIENCY: ICD-10-CM

## 2022-04-25 DIAGNOSIS — Z79.4 TYPE 2 DIABETES MELLITUS WITH HYPERGLYCEMIA, WITH LONG-TERM CURRENT USE OF INSULIN: ICD-10-CM

## 2022-04-25 DIAGNOSIS — D69.3 ACUTE ITP: ICD-10-CM

## 2022-04-25 DIAGNOSIS — I10 PRIMARY HYPERTENSION: Primary | ICD-10-CM

## 2022-04-25 DIAGNOSIS — E53.8 VITAMIN B12 DEFICIENCY: ICD-10-CM

## 2022-04-25 DIAGNOSIS — E11.42 DIABETIC PERIPHERAL NEUROPATHY: ICD-10-CM

## 2022-04-25 PROCEDURE — 99213 OFFICE O/P EST LOW 20 MIN: CPT | Performed by: FAMILY MEDICINE

## 2022-04-25 NOTE — PROGRESS NOTES
Chief Complaint  Fatigue and Hypertension     History of Present Illness  Gi Velez presents today for blood pressure check. Patient at home blood pressures have been ranging from 101/58  to 146/81 in past month . Patient is taking diltiazem 120 MG and Benazepril 10 MG.     Patient states she is still experiencing a great amount of fatigue, this is a chronic situation for years and has not worsened.   See Dr Sellers for chronic anemia, for years, last seen in Feb count was low but stable. On B12 and told to eat red meat at least once a week.  Follow up annually    Patient has diabetes, followed by Dr. Cerna whom she sees biannually    Do have sleep apnea did not tolerate CPAP  No longer seeing Dr Harris  Use Lyrica for neuropathy, controls discomfort very well.        Current Outpatient Medications on File Prior to Visit   Medication Sig   • Ascorbic Acid (VITAMIN C PO) VITAMIN C   • aspirin 81 MG EC tablet Take 81 mg by mouth Daily.   • atorvastatin (LIPITOR) 40 MG tablet Take 40 mg by mouth Daily.   • benazepril (LOTENSIN) 10 MG tablet Take 10 mg by mouth Daily.   • BIOTIN PO BIOTIN   • Cholecalciferol 1000 units capsule VITAMIN D 1000 UNIT ORAL CAPS   • CINNAMON PO CINNAMON CAPS   • clotrimazole-betamethasone (LOTRISONE) 1-0.05 % cream    • Cyanocobalamin (B-12 PO) B-12   • dilTIAZem (TIAZAC) 120 MG 24 hr capsule Take 1 capsule by mouth Daily.   • docusate sodium (COLACE) 100 MG capsule Take 100 mg by mouth 2 (Two) Times a Day.   • doxylamine (UNISOM) 25 MG tablet Take 25 mg by mouth At Night As Needed for Sleep.   • glimepiride (AMARYL) 4 MG tablet Take 4 mg by mouth 2 (Two) Times a Day.   • metFORMIN ER (GLUCOPHAGE-XR) 500 MG 24 hr tablet Take one tab ac breakfast & supper   • NOVOFINE PLUS 32G X 4 MM misc USE DAILY TO INJECT INSULIN.   • Omega-3 Fatty Acids (FISH OIL PO) FISH OIL CAPS   • ONE TOUCH ULTRA TEST test strip USE 1 STRIP EVERY 8 HOURS. E11.9   • pregabalin (LYRICA) 150 MG capsule Take 1  "capsule by mouth 2 (Two) Times a Day.   • TRESIBA FLEXTOUCH 200 UNIT/ML solution pen-injector Inject 20 Units under the skin into the appropriate area as directed Daily.   • triamcinolone (KENALOG) 0.5 % cream      No current facility-administered medications on file prior to visit.       Objective   Vital Signs:   /68   Pulse 100   Temp 98 °F (36.7 °C)   Resp 14   Ht 157.5 cm (62.01\")   Wt 64.8 kg (142 lb 12.8 oz)   SpO2 97%   BMI 26.11 kg/m²       Physical Exam  Vitals and nursing note reviewed.   Constitutional:       General: She is not in acute distress.     Appearance: She is well-developed.   HENT:      Head: Normocephalic and atraumatic.   Cardiovascular:      Rate and Rhythm: Normal rate and regular rhythm.      Heart sounds: No murmur heard.  Pulmonary:      Effort: Pulmonary effort is normal.      Breath sounds: Normal breath sounds. No wheezing.   Musculoskeletal:         General: Normal range of motion.   Skin:     General: Skin is warm and dry.      Findings: No rash.   Neurological:      Mental Status: She is alert and oriented to person, place, and time.            No visits with results within 1 Day(s) from this visit.   Latest known visit with results is:   Lab Requisition on 02/07/2022   Component Date Value Ref Range Status   • Glucose 02/07/2022 343 (A) 65 - 99 mg/dL Final   • BUN 02/07/2022 16  8 - 23 mg/dL Final   • Creatinine 02/07/2022 0.97  0.57 - 1.00 mg/dL Final   • Sodium 02/07/2022 139  136 - 145 mmol/L Final   • Potassium 02/07/2022 3.9  3.5 - 5.2 mmol/L Final   • Chloride 02/07/2022 101  98 - 107 mmol/L Final   • CO2 02/07/2022 22.0  22.0 - 29.0 mmol/L Final   • Calcium 02/07/2022 9.5  8.6 - 10.5 mg/dL Final   • Total Protein 02/07/2022 6.4  6.0 - 8.5 g/dL Final   • Albumin 02/07/2022 4.30  3.50 - 5.20 g/dL Final   • ALT (SGPT) 02/07/2022 32  1 - 33 U/L Final   • AST (SGOT) 02/07/2022 26  1 - 32 U/L Final   • Alkaline Phosphatase 02/07/2022 58  39 - 117 U/L Final   • " Total Bilirubin 02/07/2022 0.3  0.0 - 1.2 mg/dL Final   • eGFR Non  Amer 02/07/2022 56 (A) >60 mL/min/1.73 Final   • Globulin 02/07/2022 2.1  gm/dL Final   • A/G Ratio 02/07/2022 2.0  g/dL Final   • BUN/Creatinine Ratio 02/07/2022 16.5  7.0 - 25.0 Final   • Anion Gap 02/07/2022 16.0 (A) 5.0 - 15.0 mmol/L Final       Lab Results   Component Value Date    BUN 16 02/07/2022    CREATININE 0.97 02/07/2022    EGFRIFNONA 56 (L) 02/07/2022     02/07/2022    K 3.9 02/07/2022     02/07/2022    CALCIUM 9.5 02/07/2022    ALBUMIN 4.30 02/07/2022    BILITOT 0.3 02/07/2022    ALKPHOS 58 02/07/2022    AST 26 02/07/2022    ALT 32 02/07/2022        Lab Results   Component Value Date    HGBA1C 8.3 (H) 12/22/2021    HGBA1C 8.3 (H) 06/14/2021    HGBA1C 8.6 (H) 12/08/2020                Assessment and Plan    Diagnoses and all orders for this visit:    1. Primary hypertension (Primary)    2. Thrombocytopenia (HCC)    3. Acute ITP (HCC)    4. Vitamin B12 deficiency    5. Iron deficiency    6. Type 2 diabetes mellitus with hyperglycemia, with long-term current use of insulin (HCC)    7. Insomnia, unspecified type    8. Chronic fatigue    9. Diabetic peripheral neuropathy (HCC)    Hypertension at goal, continue current medications    Fatigue, chronic and stable, likely multifactorial with thrombocytopenia, diabetes with hyperglycemia, possibly anemia, and sedating drug use.  Patient declines additional work-up at this time.  She did have thyroid labs 10 months ago and they were normal.  She claims blood count was stable.  She will continue to follow with her specialists on a regular basis      There are no discontinued medications.      Follow Up     Return in about 3 months (around 7/25/2022) for Recheck, htn.    Patient was given instructions and counseling regarding her condition or for health maintenance advice. Please see specific information pulled into the AVS if appropriate.

## 2022-07-25 DIAGNOSIS — E11.42 DIABETIC PERIPHERAL NEUROPATHY: ICD-10-CM

## 2022-07-26 RX ORDER — PREGABALIN 150 MG/1
CAPSULE ORAL
Qty: 180 CAPSULE | Refills: 0 | Status: SHIPPED | OUTPATIENT
Start: 2022-07-26 | End: 2022-10-24

## 2022-07-28 ENCOUNTER — TELEPHONE (OUTPATIENT)
Dept: ENDOCRINOLOGY | Facility: CLINIC | Age: 72
End: 2022-07-28

## 2022-07-28 NOTE — TELEPHONE ENCOUNTER
Tried to call patient regarding lab closure on both #s listed in chart. The home phone just rings with no vm picking up and the cell phone rings however it states that there is no vm set up. Was not able to leave vm to tell about lab being closed.

## 2022-08-01 ENCOUNTER — LAB (OUTPATIENT)
Dept: LAB | Facility: HOSPITAL | Age: 72
End: 2022-08-01

## 2022-08-01 DIAGNOSIS — E55.9 VITAMIN D DEFICIENCY: ICD-10-CM

## 2022-08-01 DIAGNOSIS — E78.2 MIXED HYPERLIPIDEMIA: ICD-10-CM

## 2022-08-01 DIAGNOSIS — E11.42 TYPE 2 DIABETES MELLITUS WITH DIABETIC POLYNEUROPATHY, WITH LONG-TERM CURRENT USE OF INSULIN: ICD-10-CM

## 2022-08-01 DIAGNOSIS — Z79.4 TYPE 2 DIABETES MELLITUS WITH DIABETIC POLYNEUROPATHY, WITH LONG-TERM CURRENT USE OF INSULIN: ICD-10-CM

## 2022-08-01 LAB
25(OH)D3 SERPL-MCNC: 47.9 NG/ML (ref 30–100)
ALBUMIN SERPL-MCNC: 4.6 G/DL (ref 3.5–5.2)
ALBUMIN UR-MCNC: <1.2 MG/DL
ALBUMIN/GLOB SERPL: 1.8 G/DL
ALP SERPL-CCNC: 71 U/L (ref 39–117)
ALT SERPL W P-5'-P-CCNC: 30 U/L (ref 1–33)
ANION GAP SERPL CALCULATED.3IONS-SCNC: 14.2 MMOL/L (ref 5–15)
AST SERPL-CCNC: 29 U/L (ref 1–32)
BILIRUB SERPL-MCNC: 0.2 MG/DL (ref 0–1.2)
BUN SERPL-MCNC: 12 MG/DL (ref 8–23)
BUN/CREAT SERPL: 11 (ref 7–25)
CALCIUM SPEC-SCNC: 9.4 MG/DL (ref 8.6–10.5)
CHLORIDE SERPL-SCNC: 105 MMOL/L (ref 98–107)
CHOLEST SERPL-MCNC: 159 MG/DL (ref 0–200)
CO2 SERPL-SCNC: 21.8 MMOL/L (ref 22–29)
CREAT SERPL-MCNC: 1.09 MG/DL (ref 0.57–1)
CREAT UR-MCNC: 63.3 MG/DL
EGFRCR SERPLBLD CKD-EPI 2021: 53.4 ML/MIN/1.73
GLOBULIN UR ELPH-MCNC: 2.6 GM/DL
GLUCOSE SERPL-MCNC: 155 MG/DL (ref 65–99)
HBA1C MFR BLD: 8.8 % (ref 3.5–5.6)
HDLC SERPL-MCNC: 32 MG/DL (ref 40–60)
LDLC SERPL CALC-MCNC: 78 MG/DL (ref 0–100)
LDLC/HDLC SERPL: 2.11 {RATIO}
MICROALBUMIN/CREAT UR: NORMAL MG/G{CREAT}
POTASSIUM SERPL-SCNC: 4.3 MMOL/L (ref 3.5–5.2)
PROT SERPL-MCNC: 7.2 G/DL (ref 6–8.5)
SODIUM SERPL-SCNC: 141 MMOL/L (ref 136–145)
TRIGL SERPL-MCNC: 298 MG/DL (ref 0–150)
TSH SERPL DL<=0.05 MIU/L-ACNC: 3.55 UIU/ML (ref 0.27–4.2)
VLDLC SERPL-MCNC: 49 MG/DL (ref 5–40)

## 2022-08-01 PROCEDURE — 36415 COLL VENOUS BLD VENIPUNCTURE: CPT

## 2022-08-01 PROCEDURE — 84443 ASSAY THYROID STIM HORMONE: CPT

## 2022-08-01 PROCEDURE — 82043 UR ALBUMIN QUANTITATIVE: CPT

## 2022-08-01 PROCEDURE — 80061 LIPID PANEL: CPT

## 2022-08-01 PROCEDURE — 82306 VITAMIN D 25 HYDROXY: CPT

## 2022-08-01 PROCEDURE — 80053 COMPREHEN METABOLIC PANEL: CPT

## 2022-08-01 PROCEDURE — 82570 ASSAY OF URINE CREATININE: CPT

## 2022-08-01 PROCEDURE — 83036 HEMOGLOBIN GLYCOSYLATED A1C: CPT

## 2022-08-04 RX ORDER — DILTIAZEM HYDROCHLORIDE 120 MG/1
120 TABLET, FILM COATED ORAL DAILY
COMMUNITY
Start: 2022-05-08 | End: 2023-03-10

## 2022-08-08 ENCOUNTER — OFFICE VISIT (OUTPATIENT)
Dept: ENDOCRINOLOGY | Facility: CLINIC | Age: 72
End: 2022-08-08

## 2022-08-08 VITALS
DIASTOLIC BLOOD PRESSURE: 70 MMHG | HEIGHT: 62 IN | OXYGEN SATURATION: 97 % | BODY MASS INDEX: 25.95 KG/M2 | HEART RATE: 73 BPM | SYSTOLIC BLOOD PRESSURE: 120 MMHG | WEIGHT: 141 LBS

## 2022-08-08 DIAGNOSIS — E55.9 VITAMIN D DEFICIENCY: ICD-10-CM

## 2022-08-08 DIAGNOSIS — E11.42 TYPE 2 DIABETES MELLITUS WITH DIABETIC POLYNEUROPATHY, WITH LONG-TERM CURRENT USE OF INSULIN: Primary | ICD-10-CM

## 2022-08-08 DIAGNOSIS — E78.2 MIXED HYPERLIPIDEMIA: ICD-10-CM

## 2022-08-08 DIAGNOSIS — Z79.4 TYPE 2 DIABETES MELLITUS WITH DIABETIC POLYNEUROPATHY, WITH LONG-TERM CURRENT USE OF INSULIN: Primary | ICD-10-CM

## 2022-08-08 LAB — GLUCOSE BLDC GLUCOMTR-MCNC: 127 MG/DL (ref 70–105)

## 2022-08-08 PROCEDURE — 99214 OFFICE O/P EST MOD 30 MIN: CPT | Performed by: INTERNAL MEDICINE

## 2022-08-08 PROCEDURE — 82962 GLUCOSE BLOOD TEST: CPT | Performed by: INTERNAL MEDICINE

## 2022-08-08 RX ORDER — METFORMIN HYDROCHLORIDE 500 MG/1
TABLET, EXTENDED RELEASE ORAL
Start: 2022-08-08 | End: 2022-09-22 | Stop reason: SDUPTHER

## 2022-08-08 NOTE — PATIENT INSTRUCTIONS
See eye doctor in November as scheduled.  Continue exercise.   Continue Tresiba, atorvastatin & vit D supplements.  Increase fish oil to 3 @ night.  Change metformin to 2 @ supper.  Change glimepiride to 2 tabs before breakfast.  Check blood sugar before supper a couple days a week.  Call if blood sugars are running under 100 or over 200.  F/u in 6 months, with labs prior.

## 2022-08-08 NOTE — PROGRESS NOTES
[facility]        Patient Care Team:  Mercedez Guevara MD as PCP - General (Family Medicine)    Chief Complaint:    Chief Complaint   Patient presents with   • Diabetes     Type 2 /  / Ate @ 6:30 pm last night / Insulin @ 22 u its at 6:30am         Subjective   Here for diabetes f/u  Blood sugars: in the 90's in am  Exercise program: moving, swimming  Taking vit D    Interval History:     Patient Complaints: sleeping better since changed mattress  Patient Denies: hypoglycemia  History taken from: patient    Review of Systems:   Review of Systems   Eyes: Negative for blurred vision.   Gastrointestinal: Negative for nausea.   Endocrine: Negative for polyuria.   Neurological: Negative for headache.     Gained 2 lb since last visit    Objective     Vital Signs  Heart Rate:  [73] 73  BP: (120)/(70) 120/70    Physical Exam:     General Appearance:    Alert, cooperative, in no acute distress   Head:    Normocephalic, without obvious abnormality, atraumatic   Eyes:            Lids and lashes normal, conjunctivae and sclerae normal, no   icterus, no pallor, corneas clear, PERRLA   Throat:   No oral lesions,  oral mucosa moist   Neck:   36 cm in circumference, thyroid firm, no   carotid bruit   Lungs:     Clear     Heart:    Regular rhythm and normal rate   Chest Wall:    No abnormalities observed   Abdomen:     Normal bowel sounds, soft                 Extremities:   Plantar calluses, no edema               Pulses:   Pulses palpable and equal bilaterally   Skin:   Dry   Neurologic:  DTR absent in ankles, vibratory sense 90% decreased in toes, able to feel the 10g monofilament ( same as 1y ago )            Results Review:    I have reviewed the patient's new clinical results, labs & imaging.    Medication Review:   Prior to Admission medications    Medication Sig Start Date End Date Taking? Authorizing Provider   Ascorbic Acid (VITAMIN C PO) VITAMIN C 4/10/18  Yes Provider, MD Ros    aspirin 81 MG EC tablet Take 81 mg by mouth Daily.   Yes ProviderRos MD   atorvastatin (LIPITOR) 40 MG tablet Take 40 mg by mouth Daily. 4/19/19  Yes Ros Shah MD   benazepril (LOTENSIN) 10 MG tablet Take 10 mg by mouth Daily. 4/30/20  Yes Ros Shah MD   BIOTIN PO BIOTIN 4/10/18  Yes Ros Shah MD   Cholecalciferol 1000 units capsule VITAMIN D 1000 UNIT ORAL CAPS 3/31/15  Yes Ros Shah MD   CINNAMON PO CINNAMON CAPS 4/10/18  Yes Provider, MD Ros   clotrimazole-betamethasone (LOTRISONE) 1-0.05 % cream  12/26/21  Yes ProviderRos MD   Cyanocobalamin (B-12 PO) B-12 4/10/18  Yes ProviderRos MD   dilTIAZem (CARDIZEM) 120 MG tablet Take 120 mg by mouth Daily. 5/8/22  Yes Ros Shah MD   dilTIAZem (TIAZAC) 120 MG 24 hr capsule Take 1 capsule by mouth Daily. 2/7/22  Yes Mercedez Guevara MD   docusate sodium (COLACE) 100 MG capsule Take 100 mg by mouth 2 (Two) Times a Day.   Yes ProviderRos MD   doxylamine (UNISOM) 25 MG tablet Take 25 mg by mouth At Night As Needed for Sleep.   Yes Provider, MD Ros   glimepiride (AMARYL) 4 MG tablet Take 2 tab ac breakfast. 4/19/19  Yes Ros Shah MD   metFORMIN ER (GLUCOPHAGE-XR) 500 MG 24 hr tablet Take 2  tasb ac supper 8/8/22  Yes Vic Cerna MD   NOVOFINE PLUS 32G X 4 MM misc USE DAILY TO INJECT INSULIN. 7/2/19  Yes ProviderRos MD   Omega-3 Fatty Acids (FISH OIL PO) FISH OIL CAPS 4/11/17  Yes ProviderRos MD   ONE TOUCH ULTRA TEST test strip USE 1 STRIP EVERY 8 HOURS. E11.9 6/19/19  Yes ProviderRos MD   pregabalin (LYRICA) 150 MG capsule TAKE 1 CAPSULE BY MOUTH TWICE A DAY 7/26/22  Yes Mercedez Guevara MD   TRESIBA FLEXTOUCH 200 UNIT/ML solution pen-injector Inject 20 Units under the skin into the appropriate area as directed Daily. 4/23/19  Yes Provider, MD Ros   triamcinolone (KENALOG) 0.5 % cream   10/14/21  Yes ProviderRos MD   metFORMIN ER (GLUCOPHAGE-XR) 500 MG 24 hr tablet Take one tab ac breakfast & supper 6/21/21 8/8/22 Yes Vic Cerna MD       Lab Results (most recent)     Procedure Component Value Units Date/Time    POC Glucose [[id number]]  (Abnormal) Collected: 08/08/22 0750    Specimen: Blood Updated: 08/08/22 0751     Glucose 127 mg/dL      Comment: Serial Number: [id number]  [id number]           Lab Results   Component Value Date    HGBA1C 8.8 (H) 08/01/2022    HGBA1C 8.3 (H) 12/22/2021    HGBA1C 8.3 (H) 06/14/2021      Lab Results   Component Value Date    GLUCOSE 155 (H) 08/01/2022    BUN 12 08/01/2022    CREATININE 1.09 (H) 08/01/2022    EGFRIFNONA 56 (L) 02/07/2022    BCR 11.0 08/01/2022    K 4.3 08/01/2022    CO2 21.8 (L) 08/01/2022    CALCIUM 9.4 08/01/2022    ALBUMIN 4.60 08/01/2022    LABIL2 1.4 07/05/2018    AST 29 08/01/2022    ALT 30 08/01/2022    CHOL 159 08/01/2022    LDL 78 08/01/2022    HDL 32 (L) 08/01/2022    TRIG 298 (H) 08/01/2022     Lab Results   Component Value Date    TSH 3.550 08/01/2022    YYSQ47JM 47.9 08/01/2022     Microalb/cr ratio <1    Assessment & Plan     Diagnoses and all orders for this visit:    1. Type 2 diabetes mellitus with diabetic polyneuropathy, with long-term current use of insulin (HCC) (Primary)  -     metFORMIN ER (GLUCOPHAGE-XR) 500 MG 24 hr tablet; Take 2  tasb ac supper  -     Hemoglobin A1c; Future    2. Vitamin D deficiency    3. Mixed hyperlipidemia  -     Comprehensive Metabolic Panel; Future    Other orders  -     POC Glucose    Glucose control worse. Lipids & vit D stable.    See eye doctor in November as scheduled.  Continue exercise.   Continue Tresiba, atorvastatin & vit D supplements.  Increase fish oil to 3 @ night.  Change metformin to 2 @ supper (cannot tolerate increase in dose due to GI symptoms).  Change glimepiride to 2 tabs before breakfast.  Check blood sugar before supper a couple days a week.  Call if  blood sugars are running under 100 or over 200.        Vic Cerna MD  08/08/22  09:00 EDT

## 2022-08-15 ENCOUNTER — OFFICE VISIT (OUTPATIENT)
Dept: FAMILY MEDICINE CLINIC | Facility: CLINIC | Age: 72
End: 2022-08-15

## 2022-08-15 VITALS
RESPIRATION RATE: 16 BRPM | BODY MASS INDEX: 25.16 KG/M2 | DIASTOLIC BLOOD PRESSURE: 60 MMHG | SYSTOLIC BLOOD PRESSURE: 120 MMHG | TEMPERATURE: 97.5 F | HEIGHT: 63 IN | HEART RATE: 79 BPM | WEIGHT: 142 LBS | OXYGEN SATURATION: 96 %

## 2022-08-15 DIAGNOSIS — I10 HYPERTENSION, UNSPECIFIED TYPE: Primary | ICD-10-CM

## 2022-08-15 DIAGNOSIS — E78.2 MIXED HYPERLIPIDEMIA: ICD-10-CM

## 2022-08-15 DIAGNOSIS — E11.42 DIABETIC PERIPHERAL NEUROPATHY: ICD-10-CM

## 2022-08-15 DIAGNOSIS — E66.3 OVERWEIGHT WITH BODY MASS INDEX (BMI) OF 25 TO 25.9 IN ADULT: ICD-10-CM

## 2022-08-15 DIAGNOSIS — E11.42 TYPE 2 DIABETES MELLITUS WITH DIABETIC POLYNEUROPATHY, WITH LONG-TERM CURRENT USE OF INSULIN: ICD-10-CM

## 2022-08-15 DIAGNOSIS — Z79.4 TYPE 2 DIABETES MELLITUS WITH DIABETIC POLYNEUROPATHY, WITH LONG-TERM CURRENT USE OF INSULIN: ICD-10-CM

## 2022-08-15 PROCEDURE — 99214 OFFICE O/P EST MOD 30 MIN: CPT | Performed by: FAMILY MEDICINE

## 2022-08-15 RX ORDER — ATORVASTATIN CALCIUM 40 MG/1
40 TABLET, FILM COATED ORAL DAILY
Qty: 90 TABLET | Refills: 2 | Status: SHIPPED | OUTPATIENT
Start: 2022-08-15 | End: 2023-03-27

## 2022-08-22 ENCOUNTER — TELEPHONE (OUTPATIENT)
Dept: FAMILY MEDICINE CLINIC | Facility: CLINIC | Age: 72
End: 2022-08-22

## 2022-08-22 DIAGNOSIS — I10 PRIMARY HYPERTENSION: ICD-10-CM

## 2022-08-22 RX ORDER — DILTIAZEM HYDROCHLORIDE 120 MG/1
120 CAPSULE, EXTENDED RELEASE ORAL DAILY
Qty: 30 CAPSULE | Refills: 5 | Status: SHIPPED | OUTPATIENT
Start: 2022-08-22 | End: 2023-03-27

## 2022-08-22 NOTE — TELEPHONE ENCOUNTER
Caller: Gi Velez    Relationship: Self    Best call back number: [phone]  What is the best time to reach you:ANYTIME  Who are you requesting to speak with (clinical staff, provider,  specific staff member):CLINICAL STAFF  Do you know the name of the person who called: SELF  What was the call regarding: PATIENT CALLED AND STATED THE MEDICINE CALLED DITIAZEM HCL ER, 120 MG 1XDAY NEEDS TO BE SWITCHED IN YOUR NAME AND REFILLED TO A NEW PRESCRIPTION  PER PHARMACY.PATIENT HAS LESS THAN A 3 DAY SUPPLY.   Do you require a callback: YES

## 2022-09-22 DIAGNOSIS — Z79.4 TYPE 2 DIABETES MELLITUS WITH DIABETIC POLYNEUROPATHY, WITH LONG-TERM CURRENT USE OF INSULIN: ICD-10-CM

## 2022-09-22 DIAGNOSIS — E11.42 TYPE 2 DIABETES MELLITUS WITH DIABETIC POLYNEUROPATHY, WITH LONG-TERM CURRENT USE OF INSULIN: ICD-10-CM

## 2022-09-22 RX ORDER — METFORMIN HYDROCHLORIDE 500 MG/1
TABLET, EXTENDED RELEASE ORAL
Qty: 180 TABLET | Refills: 3 | Status: SHIPPED | OUTPATIENT
Start: 2022-09-22

## 2022-09-29 ENCOUNTER — APPOINTMENT (OUTPATIENT)
Dept: GENERAL RADIOLOGY | Facility: HOSPITAL | Age: 72
End: 2022-09-29

## 2022-09-29 ENCOUNTER — HOSPITAL ENCOUNTER (OUTPATIENT)
Facility: HOSPITAL | Age: 72
Setting detail: OBSERVATION
Discharge: HOME | End: 2022-10-01
Attending: EMERGENCY MEDICINE | Admitting: STUDENT IN AN ORGANIZED HEALTH CARE EDUCATION/TRAINING PROGRAM
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
## 2022-09-29 DIAGNOSIS — S42.302A CLOSED FRACTURE OF SHAFT OF LEFT HUMERUS, UNSPECIFIED FRACTURE MORPHOLOGY, INITIAL ENCOUNTER: Primary | ICD-10-CM

## 2022-09-29 PROBLEM — S42.412A LEFT SUPRACONDYLAR HUMERUS FRACTURE, CLOSED, INITIAL ENCOUNTER: Status: ACTIVE | Noted: 2022-09-29

## 2022-09-29 LAB
ANION GAP SERPL CALCULATED.3IONS-SCNC: 12 MMOL/L (ref 5–15)
APTT PPP: 26.2 SECONDS (ref 61–76.5)
BASOPHILS # BLD AUTO: 0 10*3/MM3 (ref 0–0.2)
BASOPHILS NFR BLD AUTO: 0.6 % (ref 0–1.5)
BUN SERPL-MCNC: 20 MG/DL (ref 8–23)
BUN/CREAT SERPL: 21.7 (ref 7–25)
CALCIUM SPEC-SCNC: 10.3 MG/DL (ref 8.6–10.5)
CHLORIDE SERPL-SCNC: 107 MMOL/L (ref 98–107)
CO2 SERPL-SCNC: 22 MMOL/L (ref 22–29)
CREAT SERPL-MCNC: 0.92 MG/DL (ref 0.57–1)
DEPRECATED RDW RBC AUTO: 49.4 FL (ref 37–54)
EGFRCR SERPLBLD CKD-EPI 2021: 65.5 ML/MIN/1.73
EOSINOPHIL # BLD AUTO: 0.2 10*3/MM3 (ref 0–0.4)
EOSINOPHIL NFR BLD AUTO: 2.6 % (ref 0.3–6.2)
ERYTHROCYTE [DISTWIDTH] IN BLOOD BY AUTOMATED COUNT: 16.8 % (ref 12.3–15.4)
GLUCOSE BLDC GLUCOMTR-MCNC: 123 MG/DL (ref 70–105)
GLUCOSE BLDC GLUCOMTR-MCNC: 53 MG/DL (ref 70–105)
GLUCOSE BLDC GLUCOMTR-MCNC: 94 MG/DL (ref 70–105)
GLUCOSE SERPL-MCNC: 83 MG/DL (ref 65–99)
HCT VFR BLD AUTO: 41.6 % (ref 34–46.6)
HGB BLD-MCNC: 13.3 G/DL (ref 12–15.9)
INR PPP: 1.06 (ref 0.93–1.1)
LYMPHOCYTES # BLD AUTO: 2.5 10*3/MM3 (ref 0.7–3.1)
LYMPHOCYTES NFR BLD AUTO: 31 % (ref 19.6–45.3)
MCH RBC QN AUTO: 27.4 PG (ref 26.6–33)
MCHC RBC AUTO-ENTMCNC: 32 G/DL (ref 31.5–35.7)
MCV RBC AUTO: 85.7 FL (ref 79–97)
MONOCYTES # BLD AUTO: 0.5 10*3/MM3 (ref 0.1–0.9)
MONOCYTES NFR BLD AUTO: 6.6 % (ref 5–12)
NEUTROPHILS NFR BLD AUTO: 4.9 10*3/MM3 (ref 1.7–7)
NEUTROPHILS NFR BLD AUTO: 59.2 % (ref 42.7–76)
NRBC BLD AUTO-RTO: 0 /100 WBC (ref 0–0.2)
PLATELET # BLD AUTO: 97 10*3/MM3 (ref 140–450)
PMV BLD AUTO: 11.1 FL (ref 6–12)
POTASSIUM SERPL-SCNC: 4.3 MMOL/L (ref 3.5–5.2)
PROTHROMBIN TIME: 10.9 SECONDS (ref 9.6–11.7)
RBC # BLD AUTO: 4.85 10*6/MM3 (ref 3.77–5.28)
SODIUM SERPL-SCNC: 141 MMOL/L (ref 136–145)
WBC NRBC COR # BLD: 8.2 10*3/MM3 (ref 3.4–10.8)

## 2022-09-29 PROCEDURE — 73090 X-RAY EXAM OF FOREARM: CPT

## 2022-09-29 PROCEDURE — 82962 GLUCOSE BLOOD TEST: CPT

## 2022-09-29 PROCEDURE — 85025 COMPLETE CBC W/AUTO DIFF WBC: CPT | Performed by: PHYSICIAN ASSISTANT

## 2022-09-29 PROCEDURE — 25010000002 MORPHINE PER 10 MG: Performed by: PHYSICIAN ASSISTANT

## 2022-09-29 PROCEDURE — 71045 X-RAY EXAM CHEST 1 VIEW: CPT

## 2022-09-29 PROCEDURE — 80048 BASIC METABOLIC PNL TOTAL CA: CPT | Performed by: PHYSICIAN ASSISTANT

## 2022-09-29 PROCEDURE — 85730 THROMBOPLASTIN TIME PARTIAL: CPT | Performed by: PHYSICIAN ASSISTANT

## 2022-09-29 PROCEDURE — 25010000002 ONDANSETRON PER 1 MG: Performed by: PHYSICIAN ASSISTANT

## 2022-09-29 PROCEDURE — 85610 PROTHROMBIN TIME: CPT | Performed by: PHYSICIAN ASSISTANT

## 2022-09-29 PROCEDURE — 73060 X-RAY EXAM OF HUMERUS: CPT

## 2022-09-29 PROCEDURE — 96376 TX/PRO/DX INJ SAME DRUG ADON: CPT

## 2022-09-29 PROCEDURE — G0378 HOSPITAL OBSERVATION PER HR: HCPCS

## 2022-09-29 PROCEDURE — 99284 EMERGENCY DEPT VISIT MOD MDM: CPT

## 2022-09-29 PROCEDURE — 96374 THER/PROPH/DIAG INJ IV PUSH: CPT

## 2022-09-29 PROCEDURE — 96375 TX/PRO/DX INJ NEW DRUG ADDON: CPT

## 2022-09-29 RX ORDER — ALUMINA, MAGNESIA, AND SIMETHICONE 2400; 2400; 240 MG/30ML; MG/30ML; MG/30ML
15 SUSPENSION ORAL EVERY 6 HOURS PRN
Status: DISCONTINUED | OUTPATIENT
Start: 2022-09-29 | End: 2022-10-01 | Stop reason: HOSPADM

## 2022-09-29 RX ORDER — ONDANSETRON 4 MG/1
4 TABLET, FILM COATED ORAL EVERY 6 HOURS PRN
Status: DISCONTINUED | OUTPATIENT
Start: 2022-09-29 | End: 2022-10-01 | Stop reason: HOSPADM

## 2022-09-29 RX ORDER — SODIUM CHLORIDE 0.9 % (FLUSH) 0.9 %
10 SYRINGE (ML) INJECTION EVERY 12 HOURS SCHEDULED
Status: DISCONTINUED | OUTPATIENT
Start: 2022-09-29 | End: 2022-10-01 | Stop reason: HOSPADM

## 2022-09-29 RX ORDER — ONDANSETRON 2 MG/ML
4 INJECTION INTRAMUSCULAR; INTRAVENOUS EVERY 6 HOURS PRN
Status: DISCONTINUED | OUTPATIENT
Start: 2022-09-29 | End: 2022-10-01 | Stop reason: HOSPADM

## 2022-09-29 RX ORDER — ACETAMINOPHEN 650 MG/1
650 SUPPOSITORY RECTAL EVERY 4 HOURS PRN
Status: DISCONTINUED | OUTPATIENT
Start: 2022-09-29 | End: 2022-10-01 | Stop reason: HOSPADM

## 2022-09-29 RX ORDER — ONDANSETRON 2 MG/ML
4 INJECTION INTRAMUSCULAR; INTRAVENOUS ONCE
Status: COMPLETED | OUTPATIENT
Start: 2022-09-29 | End: 2022-09-29

## 2022-09-29 RX ORDER — CHOLECALCIFEROL (VITAMIN D3) 125 MCG
5 CAPSULE ORAL NIGHTLY PRN
Status: DISCONTINUED | OUTPATIENT
Start: 2022-09-29 | End: 2022-10-01 | Stop reason: HOSPADM

## 2022-09-29 RX ORDER — ACETAMINOPHEN 325 MG/1
650 TABLET ORAL EVERY 4 HOURS PRN
Status: DISCONTINUED | OUTPATIENT
Start: 2022-09-29 | End: 2022-10-01 | Stop reason: HOSPADM

## 2022-09-29 RX ORDER — SODIUM CHLORIDE 0.9 % (FLUSH) 0.9 %
10 SYRINGE (ML) INJECTION AS NEEDED
Status: DISCONTINUED | OUTPATIENT
Start: 2022-09-29 | End: 2022-10-01 | Stop reason: HOSPADM

## 2022-09-29 RX ORDER — NICOTINE POLACRILEX 4 MG
15 LOZENGE BUCCAL
Status: DISCONTINUED | OUTPATIENT
Start: 2022-09-29 | End: 2022-10-01 | Stop reason: HOSPADM

## 2022-09-29 RX ORDER — DEXTROSE MONOHYDRATE 25 G/50ML
25 INJECTION, SOLUTION INTRAVENOUS
Status: DISCONTINUED | OUTPATIENT
Start: 2022-09-29 | End: 2022-10-01 | Stop reason: HOSPADM

## 2022-09-29 RX ORDER — ATORVASTATIN CALCIUM 40 MG/1
40 TABLET, FILM COATED ORAL NIGHTLY
Status: COMPLETED | OUTPATIENT
Start: 2022-09-29 | End: 2022-09-29

## 2022-09-29 RX ORDER — CALCIUM CARBONATE 200(500)MG
2 TABLET,CHEWABLE ORAL 2 TIMES DAILY PRN
Status: DISCONTINUED | OUTPATIENT
Start: 2022-09-29 | End: 2022-10-01 | Stop reason: HOSPADM

## 2022-09-29 RX ORDER — INSULIN LISPRO 100 [IU]/ML
0-9 INJECTION, SOLUTION INTRAVENOUS; SUBCUTANEOUS EVERY 6 HOURS SCHEDULED
Status: DISCONTINUED | OUTPATIENT
Start: 2022-09-29 | End: 2022-10-01 | Stop reason: HOSPADM

## 2022-09-29 RX ORDER — PREGABALIN 75 MG/1
150 CAPSULE ORAL NIGHTLY
Status: COMPLETED | OUTPATIENT
Start: 2022-09-29 | End: 2022-09-29

## 2022-09-29 RX ORDER — ACETAMINOPHEN 160 MG/5ML
650 SOLUTION ORAL EVERY 4 HOURS PRN
Status: DISCONTINUED | OUTPATIENT
Start: 2022-09-29 | End: 2022-10-01 | Stop reason: HOSPADM

## 2022-09-29 RX ORDER — MORPHINE SULFATE 2 MG/ML
2 INJECTION, SOLUTION INTRAMUSCULAR; INTRAVENOUS
Status: DISCONTINUED | OUTPATIENT
Start: 2022-09-29 | End: 2022-10-01

## 2022-09-29 RX ORDER — LISINOPRIL 5 MG/1
10 TABLET ORAL ONCE
Status: COMPLETED | OUTPATIENT
Start: 2022-09-29 | End: 2022-09-29

## 2022-09-29 RX ORDER — OLANZAPINE 10 MG/2ML
1 INJECTION, POWDER, LYOPHILIZED, FOR SOLUTION INTRAMUSCULAR AS NEEDED
Status: DISCONTINUED | OUTPATIENT
Start: 2022-09-29 | End: 2022-10-01 | Stop reason: HOSPADM

## 2022-09-29 RX ADMIN — PREGABALIN 150 MG: 75 CAPSULE ORAL at 21:52

## 2022-09-29 RX ADMIN — ATORVASTATIN CALCIUM 40 MG: 40 TABLET, FILM COATED ORAL at 21:52

## 2022-09-29 RX ADMIN — ONDANSETRON 4 MG: 2 INJECTION INTRAMUSCULAR; INTRAVENOUS at 18:42

## 2022-09-29 RX ADMIN — Medication 10 ML: at 21:53

## 2022-09-29 RX ADMIN — MORPHINE SULFATE 4 MG: 4 INJECTION INTRAVENOUS at 18:42

## 2022-09-29 RX ADMIN — LISINOPRIL 10 MG: 5 TABLET ORAL at 21:52

## 2022-09-29 RX ADMIN — MORPHINE SULFATE 4 MG: 4 INJECTION INTRAVENOUS at 21:00

## 2022-09-30 LAB
ANION GAP SERPL CALCULATED.3IONS-SCNC: 12 MMOL/L (ref 5–15)
BASOPHILS # BLD AUTO: 0 10*3/MM3 (ref 0–0.2)
BASOPHILS NFR BLD AUTO: 0.7 % (ref 0–1.5)
BUN SERPL-MCNC: 23 MG/DL (ref 8–23)
BUN/CREAT SERPL: 23 (ref 7–25)
CALCIUM SPEC-SCNC: 8.7 MG/DL (ref 8.6–10.5)
CHLORIDE SERPL-SCNC: 105 MMOL/L (ref 98–107)
CO2 SERPL-SCNC: 22 MMOL/L (ref 22–29)
CREAT SERPL-MCNC: 1 MG/DL (ref 0.57–1)
DEPRECATED RDW RBC AUTO: 51.6 FL (ref 37–54)
EGFRCR SERPLBLD CKD-EPI 2021: 59.2 ML/MIN/1.73
EOSINOPHIL # BLD AUTO: 0.2 10*3/MM3 (ref 0–0.4)
EOSINOPHIL NFR BLD AUTO: 2.9 % (ref 0.3–6.2)
ERYTHROCYTE [DISTWIDTH] IN BLOOD BY AUTOMATED COUNT: 17.2 % (ref 12.3–15.4)
GLUCOSE BLDC GLUCOMTR-MCNC: 106 MG/DL (ref 70–105)
GLUCOSE BLDC GLUCOMTR-MCNC: 178 MG/DL (ref 70–105)
GLUCOSE BLDC GLUCOMTR-MCNC: 194 MG/DL (ref 70–105)
GLUCOSE SERPL-MCNC: 271 MG/DL (ref 65–99)
HCT VFR BLD AUTO: 36.1 % (ref 34–46.6)
HGB BLD-MCNC: 11.4 G/DL (ref 12–15.9)
LYMPHOCYTES # BLD AUTO: 2.8 10*3/MM3 (ref 0.7–3.1)
LYMPHOCYTES NFR BLD AUTO: 41.7 % (ref 19.6–45.3)
MCH RBC QN AUTO: 27.3 PG (ref 26.6–33)
MCHC RBC AUTO-ENTMCNC: 31.7 G/DL (ref 31.5–35.7)
MCV RBC AUTO: 85.9 FL (ref 79–97)
MONOCYTES # BLD AUTO: 0.6 10*3/MM3 (ref 0.1–0.9)
MONOCYTES NFR BLD AUTO: 9.2 % (ref 5–12)
NEUTROPHILS NFR BLD AUTO: 3 10*3/MM3 (ref 1.7–7)
NEUTROPHILS NFR BLD AUTO: 45.5 % (ref 42.7–76)
NRBC BLD AUTO-RTO: 0.1 /100 WBC (ref 0–0.2)
PLATELET # BLD AUTO: 88 10*3/MM3 (ref 140–450)
PMV BLD AUTO: 11 FL (ref 6–12)
POTASSIUM SERPL-SCNC: 4.6 MMOL/L (ref 3.5–5.2)
RBC # BLD AUTO: 4.2 10*6/MM3 (ref 3.77–5.28)
SODIUM SERPL-SCNC: 139 MMOL/L (ref 136–145)
VIT B12 BLD-MCNC: >2000 PG/ML (ref 211–946)
WBC NRBC COR # BLD: 6.6 10*3/MM3 (ref 3.4–10.8)

## 2022-09-30 PROCEDURE — 80048 BASIC METABOLIC PNL TOTAL CA: CPT | Performed by: NURSE PRACTITIONER

## 2022-09-30 PROCEDURE — 25010000002 MORPHINE PER 10 MG: Performed by: NURSE PRACTITIONER

## 2022-09-30 PROCEDURE — 84425 ASSAY OF VITAMIN B-1: CPT | Performed by: NURSE PRACTITIONER

## 2022-09-30 PROCEDURE — 82962 GLUCOSE BLOOD TEST: CPT

## 2022-09-30 PROCEDURE — G0378 HOSPITAL OBSERVATION PER HR: HCPCS

## 2022-09-30 PROCEDURE — 96376 TX/PRO/DX INJ SAME DRUG ADON: CPT

## 2022-09-30 PROCEDURE — 36415 COLL VENOUS BLD VENIPUNCTURE: CPT | Performed by: NURSE PRACTITIONER

## 2022-09-30 PROCEDURE — 82607 VITAMIN B-12: CPT | Performed by: NURSE PRACTITIONER

## 2022-09-30 PROCEDURE — 85025 COMPLETE CBC W/AUTO DIFF WBC: CPT | Performed by: NURSE PRACTITIONER

## 2022-09-30 RX ORDER — DILTIAZEM HYDROCHLORIDE 120 MG/1
120 CAPSULE, COATED, EXTENDED RELEASE ORAL
Refills: 5 | Status: DISCONTINUED | OUTPATIENT
Start: 2022-09-30 | End: 2022-10-01 | Stop reason: HOSPADM

## 2022-09-30 RX ORDER — LISINOPRIL 5 MG/1
10 TABLET ORAL
Status: DISCONTINUED | OUTPATIENT
Start: 2022-09-30 | End: 2022-10-01 | Stop reason: HOSPADM

## 2022-09-30 RX ORDER — PREGABALIN 75 MG/1
150 CAPSULE ORAL 2 TIMES DAILY
Status: DISCONTINUED | OUTPATIENT
Start: 2022-09-30 | End: 2022-10-01 | Stop reason: HOSPADM

## 2022-09-30 RX ORDER — ATORVASTATIN CALCIUM 40 MG/1
40 TABLET, FILM COATED ORAL NIGHTLY
Status: DISCONTINUED | OUTPATIENT
Start: 2022-09-30 | End: 2022-10-01 | Stop reason: HOSPADM

## 2022-09-30 RX ADMIN — PREGABALIN 150 MG: 75 CAPSULE ORAL at 19:43

## 2022-09-30 RX ADMIN — Medication 10 ML: at 08:48

## 2022-09-30 RX ADMIN — DILTIAZEM HYDROCHLORIDE 120 MG: 120 CAPSULE, COATED, EXTENDED RELEASE ORAL at 19:43

## 2022-09-30 RX ADMIN — PREGABALIN 150 MG: 75 CAPSULE ORAL at 08:33

## 2022-09-30 RX ADMIN — MORPHINE SULFATE 2 MG: 2 INJECTION, SOLUTION INTRAMUSCULAR; INTRAVENOUS at 08:39

## 2022-09-30 RX ADMIN — MORPHINE SULFATE 2 MG: 2 INJECTION, SOLUTION INTRAMUSCULAR; INTRAVENOUS at 11:43

## 2022-09-30 RX ADMIN — MORPHINE SULFATE 2 MG: 2 INJECTION, SOLUTION INTRAMUSCULAR; INTRAVENOUS at 19:58

## 2022-09-30 RX ADMIN — LISINOPRIL 10 MG: 5 TABLET ORAL at 19:43

## 2022-09-30 RX ADMIN — Medication 10 ML: at 19:42

## 2022-09-30 RX ADMIN — MORPHINE SULFATE 2 MG: 2 INJECTION, SOLUTION INTRAMUSCULAR; INTRAVENOUS at 16:19

## 2022-10-01 VITALS
SYSTOLIC BLOOD PRESSURE: 132 MMHG | HEART RATE: 79 BPM | WEIGHT: 138 LBS | DIASTOLIC BLOOD PRESSURE: 40 MMHG | BODY MASS INDEX: 25.4 KG/M2 | OXYGEN SATURATION: 95 % | RESPIRATION RATE: 18 BRPM | TEMPERATURE: 97.6 F | HEIGHT: 62 IN

## 2022-10-01 LAB
ANION GAP SERPL CALCULATED.3IONS-SCNC: 13 MMOL/L (ref 5–15)
BASOPHILS # BLD AUTO: 0.1 10*3/MM3 (ref 0–0.2)
BASOPHILS NFR BLD AUTO: 0.8 % (ref 0–1.5)
BUN SERPL-MCNC: 24 MG/DL (ref 8–23)
BUN/CREAT SERPL: 26.4 (ref 7–25)
CALCIUM SPEC-SCNC: 8.9 MG/DL (ref 8.6–10.5)
CHLORIDE SERPL-SCNC: 101 MMOL/L (ref 98–107)
CO2 SERPL-SCNC: 23 MMOL/L (ref 22–29)
CREAT SERPL-MCNC: 0.91 MG/DL (ref 0.57–1)
DEPRECATED RDW RBC AUTO: 52.9 FL (ref 37–54)
EGFRCR SERPLBLD CKD-EPI 2021: 66.3 ML/MIN/1.73
EOSINOPHIL # BLD AUTO: 0.2 10*3/MM3 (ref 0–0.4)
EOSINOPHIL NFR BLD AUTO: 3.1 % (ref 0.3–6.2)
ERYTHROCYTE [DISTWIDTH] IN BLOOD BY AUTOMATED COUNT: 17.1 % (ref 12.3–15.4)
GLUCOSE BLDC GLUCOMTR-MCNC: 152 MG/DL (ref 70–105)
GLUCOSE BLDC GLUCOMTR-MCNC: 183 MG/DL (ref 70–105)
GLUCOSE BLDC GLUCOMTR-MCNC: 347 MG/DL (ref 70–105)
GLUCOSE SERPL-MCNC: 173 MG/DL (ref 65–99)
HCT VFR BLD AUTO: 37.3 % (ref 34–46.6)
HGB BLD-MCNC: 11.6 G/DL (ref 12–15.9)
LYMPHOCYTES # BLD AUTO: 2.7 10*3/MM3 (ref 0.7–3.1)
LYMPHOCYTES NFR BLD AUTO: 34.3 % (ref 19.6–45.3)
MCH RBC QN AUTO: 27.5 PG (ref 26.6–33)
MCHC RBC AUTO-ENTMCNC: 31.2 G/DL (ref 31.5–35.7)
MCV RBC AUTO: 88.1 FL (ref 79–97)
MONOCYTES # BLD AUTO: 0.9 10*3/MM3 (ref 0.1–0.9)
MONOCYTES NFR BLD AUTO: 11.8 % (ref 5–12)
NEUTROPHILS NFR BLD AUTO: 3.9 10*3/MM3 (ref 1.7–7)
NEUTROPHILS NFR BLD AUTO: 50 % (ref 42.7–76)
NRBC BLD AUTO-RTO: 0.1 /100 WBC (ref 0–0.2)
PLATELET # BLD AUTO: 91 10*3/MM3 (ref 140–450)
PMV BLD AUTO: 11.7 FL (ref 6–12)
POTASSIUM SERPL-SCNC: 4.4 MMOL/L (ref 3.5–5.2)
RBC # BLD AUTO: 4.23 10*6/MM3 (ref 3.77–5.28)
SODIUM SERPL-SCNC: 137 MMOL/L (ref 136–145)
WBC NRBC COR # BLD: 7.9 10*3/MM3 (ref 3.4–10.8)

## 2022-10-01 PROCEDURE — G0378 HOSPITAL OBSERVATION PER HR: HCPCS

## 2022-10-01 PROCEDURE — 80048 BASIC METABOLIC PNL TOTAL CA: CPT | Performed by: NURSE PRACTITIONER

## 2022-10-01 PROCEDURE — 25010000002 MORPHINE PER 10 MG: Performed by: NURSE PRACTITIONER

## 2022-10-01 PROCEDURE — 82962 GLUCOSE BLOOD TEST: CPT

## 2022-10-01 PROCEDURE — 25010000002 MORPHINE PER 10 MG: Performed by: STUDENT IN AN ORGANIZED HEALTH CARE EDUCATION/TRAINING PROGRAM

## 2022-10-01 PROCEDURE — 96376 TX/PRO/DX INJ SAME DRUG ADON: CPT

## 2022-10-01 PROCEDURE — 63710000001 INSULIN LISPRO (HUMAN) PER 5 UNITS: Performed by: NURSE PRACTITIONER

## 2022-10-01 PROCEDURE — 99204 OFFICE O/P NEW MOD 45 MIN: CPT | Performed by: ORTHOPAEDIC SURGERY

## 2022-10-01 PROCEDURE — 85025 COMPLETE CBC W/AUTO DIFF WBC: CPT | Performed by: NURSE PRACTITIONER

## 2022-10-01 PROCEDURE — 36415 COLL VENOUS BLD VENIPUNCTURE: CPT | Performed by: NURSE PRACTITIONER

## 2022-10-01 RX ADMIN — MORPHINE SULFATE 4 MG: 4 INJECTION, SOLUTION INTRAMUSCULAR; INTRAVENOUS at 10:50

## 2022-10-01 RX ADMIN — MORPHINE SULFATE 4 MG: 4 INJECTION, SOLUTION INTRAMUSCULAR; INTRAVENOUS at 14:07

## 2022-10-01 RX ADMIN — Medication 10 ML: at 08:25

## 2022-10-01 RX ADMIN — INSULIN LISPRO 7 UNITS: 100 INJECTION, SOLUTION INTRAVENOUS; SUBCUTANEOUS at 12:07

## 2022-10-01 RX ADMIN — PREGABALIN 150 MG: 75 CAPSULE ORAL at 08:24

## 2022-10-01 RX ADMIN — MORPHINE SULFATE 2 MG: 2 INJECTION, SOLUTION INTRAMUSCULAR; INTRAVENOUS at 08:21

## 2022-10-01 NOTE — CONSULTS
Orthopedic Consult      Patient: Gi Velez    Date of Admission: 9/29/2022  5:46 PM    YOB: 1950    Medical Record Number: [id number]    Attending Physician: Argelia Auguste DO  Consulting Physician:   Jarrett Salas MD, orthopedic surgery.      Chief Complaints: Fall, initial encounter [W19.XXXA]  Left supracondylar humerus fracture, closed, initial encounter [S42.412A]  Closed fracture of shaft of left humerus, unspecified fracture morphology, initial encounter [S42.302A]      History of Present Illness: 72 y.o. female admitted to [facility] to services of Argelia Auguste DO with Fall, initial encounter [W19.XXXA]  Left supracondylar humerus fracture, closed, initial encounter [S42.412A]  Closed fracture of shaft of left humerus, unspecified fracture morphology, initial encounter [S42.302A].  was consulted for further evaluation and treatment. Onset of symptoms was abrupt starting a few hours ago.  Symptoms are associated with pain and discomfort with deformity of the left upper extremity.  The patient fell while she was at home.  She fell on a hard concrete surface.  As she went down she struck the proximal part of her humerus against a wooden stool.  She had distinct pain and discomfort with deformity of the left upper extremity.  The patient was seen in the emergency room and diagnosed with a humeral diaphyseal fracture.  In addition there is a fracture of the proximal humerus and we were consulted for orthopedic care.  Symptoms are aggravated by any movement of the left upper extremity.   Symptoms improve with strict bedrest and use of a sling.     Allergies:   Allergies   Allergen Reactions    Adhesive Tape Other (See Comments)     Blisters      Other Hives     topomax and bee stings    Topiramate Hives       Medications:   Home Medications:  No current facility-administered medications on file prior to encounter.     Current Outpatient Medications on File Prior to Encounter    Medication Sig    Ascorbic Acid (VITAMIN C PO) VITAMIN C    aspirin 81 MG EC tablet Take 81 mg by mouth Daily.    atorvastatin (LIPITOR) 40 MG tablet Take 1 tablet by mouth Daily.    benazepril (LOTENSIN) 10 MG tablet Take 10 mg by mouth Daily.    BIOTIN PO BIOTIN    Cholecalciferol 1000 units capsule VITAMIN D 1000 UNIT ORAL CAPS    CINNAMON PO CINNAMON CAPS    Cyanocobalamin (B-12 PO) B-12    dilTIAZem (TIAZAC) 120 MG 24 hr capsule Take 1 capsule by mouth Daily.    glimepiride (AMARYL) 4 MG tablet Take 2 tab ac breakfast.    metFORMIN ER (GLUCOPHAGE-XR) 500 MG 24 hr tablet Take 2  tabs ac supper    Omega-3 Fatty Acids (FISH OIL PO) FISH OIL CAPS    pregabalin (LYRICA) 150 MG capsule TAKE 1 CAPSULE BY MOUTH TWICE A DAY    clotrimazole-betamethasone (LOTRISONE) 1-0.05 % cream     dilTIAZem (CARDIZEM) 120 MG tablet Take 120 mg by mouth Daily.    docusate sodium (COLACE) 100 MG capsule Take 100 mg by mouth 2 (Two) Times a Day.    doxylamine (UNISOM) 25 MG tablet Take 25 mg by mouth At Night As Needed for Sleep.    Insulin Pen Needle (NovoFine Plus) 32G X 4 MM misc Inject 1 Piece under the skin into the appropriate area as directed Every Night.    ONE TOUCH ULTRA TEST test strip USE 1 STRIP EVERY 8 HOURS. E11.9    TRESIBA FLEXTOUCH 200 UNIT/ML solution pen-injector Inject 20 Units under the skin into the appropriate area as directed Daily.    triamcinolone (KENALOG) 0.5 % cream      Current Medications:  Scheduled Meds:atorvastatin, 40 mg, Oral, Nightly  dilTIAZem CD, 120 mg, Oral, Q24H  insulin lispro, 0-9 Units, Subcutaneous, Q6H  lisinopril, 10 mg, Oral, Q24H  pregabalin, 150 mg, Oral, BID  sodium chloride, 10 mL, Intravenous, Q12H      Continuous Infusions:   PRN Meds:.  acetaminophen **OR** acetaminophen **OR** acetaminophen    aluminum-magnesium hydroxide-simethicone    calcium carbonate    dextrose    dextrose    doxylamine    glucagon (human recombinant)    melatonin    Morphine    ondansetron **OR**  ondansetron    [COMPLETED] Insert peripheral IV **AND** sodium chloride    sodium chloride    Past Medical History:   Diagnosis Date    Diabetes mellitus (HCC)     diagnosed in the 1990's    Hyperlipidemia 2000    Hypertension 2018    Irregular heart beat 2005    Lymphocytosis 12/2009    Neuropathy     diagnosed in the 1990's    Thrombocytopenia (formerly [facility]) 12/2009    Type 2 diabetes mellitus (formerly [facility]) 1998        Past Surgical History:   Procedure Laterality Date    COLONOSCOPY W/ BIOPSIES  07/2021    HYSTERECTOMY      in the 1980's-increased bleeding    TUBAL ABDOMINAL LIGATION          Social History     Occupational History    Not on file   Tobacco Use    Smoking status: Never Smoker    Smokeless tobacco: Never Used   Vaping Use    Vaping Use: Never used   Substance and Sexual Activity    Alcohol use: Yes     Comment: rare occasion    Drug use: Never    Sexual activity: Defer      Social History     Social History Narrative    Not on file        Family History   Adopted: Yes   Problem Relation Age of Onset    No Known Problems Other          Review of Systems:   HEENT: Patient denies any headaches, vision changes, change in hearing, or tinnitus, Patient denies any rhinorrhea,epistaxis, sinus pain, mouth or dental problems, sore throat or hoarseness, or dysphagia  Pulmonary: Patient denies any cough, congestion, SOA, or wheezing  Cardiovascular: Patient denies any chest pain, dyspnea, palpitations, weakness, intolerance of exercise, varicosities, swelling of extremities, known murmur  Gastrointestinal:  Patient denies nausea, vomiting, diarrhea, constipation, loss  of appetite, change in appetite, dysphagia, gas, heartburn, melena, change in bowel habits, use of laxatives or other drugs to alter the function of the gastrointestinal tract.  Genital/Urinary: Patient denies dysuria, change in color of urine, change in frequency of urination, pain with urgency, incontinence, retention, or nocturia.  Musculoskeletal: Patient  denies increased warmth; redness; or swelling of joints; limitation of function; deformity; crepitation: pain in a joint or an extremity, the neck, or the back, especially with movement.  Neurological: Patient denies dizziness, tremor, ataxia, difficulty in speaking, change in speech, paresthesia, loss of sensation, seizures, syncope, changes in memory.  Endocrine system: Patient denies tremors, palpitations, intolerance of heat or cold, polyuria, polydipsia, polyphagia, diaphoresis, exophthalmos, or goiter.  Psychological: Patient denies thoughts/plans or harming self or other; depression,  insomnia, night terrors, rossana, memory loss, disorientation.  Skin: Patient denies any bruising, rashes, discoloration, pruritus, wounds, ulcers, decubiti, changes in the hair or nails  Hematopoietic: Patient denies history of spontaneous or excessive bleeding, epistaxis, hematuria, melena, fatigue, enlarged or tender lymph nodes, pallor, history of anemia.    Physical Exam: 72 y.o. female  General Appearance:    Alert, cooperative, in no acute distress                   Vitals:    09/30/22 1725 09/30/22 2035 10/01/22 0003 10/01/22 0429   BP: 115/67 118/63 146/45 134/95   BP Location: Right arm Right arm Right arm Right arm   Patient Position: Lying Lying Lying Lying   Pulse: 66 80 83 94   Resp: 16 14 17 14   Temp: 98.7 °F (37.1 °C) 98.1 °F (36.7 °C) 99.5 °F (37.5 °C) 99.2 °F (37.3 °C)   TempSrc: Oral Oral Oral Oral   SpO2: 96% 96% 95% 98%   Weight:       Height:            Head:    Normocephalic, without obvious abnormality, atraumatic   Eyes:            Lids and lashes normal, conjunctivae and sclerae normal, no   icterus, no pallor, corneas clear, PERRLA   Ears:    Ears appear intact with no abnormalities noted   Throat:   No oral lesions, no thrush, oral mucosa moist   Neck:   No adenopathy, supple, trachea midline, no thyromegaly, no   carotid bruit, no JVD   Back:     No kyphosis present, no scoliosis present, no skin  lesions,      erythema or scars, no tenderness to percussion or                   palpation,   range of motion normal   Lungs:     Clear to auscultation,respirations regular, even and                  unlabored    Heart:    Regular rhythm and normal rate, normal S1 and S2, no            murmur, no gallop, no rub, no click   Chest Wall:    No abnormalities observed   Abdomen:     Normal bowel sounds, no masses, no organomegaly, soft        non-tender, non-distended, no guarding, no rebound                tenderness   Rectal:     Deferred   Extremities:   Tenderness noted at anterior and lateral aspect of the left humerus.  Moves all extremities well, no       edema, no cyanosis, no redness   Pulses:   Pulses palpable and equal bilaterally   Skin:   No bleeding, bruising or rash   Lymph nodes:   No palpable adenopathy   Neurologic:   Cranial nerves 2 - 12 grossly intact, sensation intact, DTR       present and equal bilaterally     left shoulder. The patient has extreme amounts of tenderness over the greater tuberosity of the humerus. There is some tenderness over the lesser tuberosity as well. External rotation is extremely limited and painful. Rotator cuff function is inhibited because of pain and tenderness at the insertion of the rotator cuff on the greater tuberosity of the humerus. Axillary nerve function is well preserved. Biceps function is intact. Radial artey pulses are palpable. Apprehension sign is negative. Both active and passive ranges of motion are extremely limited and painful for the patient. There is no clinical deformity. There does appear to be some abnormal mobility at the fracture site. The pain level is 8.       Diagnostic Tests:  [unfilled]      [unfilled]    Assessment:  Patient Active Problem List   Diagnosis    Thrombocytopenia (HCC)    Acute ITP (HCC)    Vitamin B12 deficiency    Iron deficiency    Vitamin D deficiency    Obstructive sleep apnea    Hyperlipidemia    Goiter    Encounter  for long-term (current) use of other medications    Diabetic peripheral neuropathy (HCC)    Diabetes mellitus, type II (HCC)    Nuclear cataract    Glaucoma suspect    Epiretinal membrane    Chronic fatigue    Insomnia    Overweight with body mass index (BMI) of 25 to 25.9 in adult    Left supracondylar humerus fracture, closed, initial encounter           Plan:  The patient voiced understanding of the risks, benefits, and alternative forms of treatment that were discussed and the patient consents to proceed with open reduction internal fixation of a humeral diaphyseal fracture.  This is a very complex setting and a very complicated fracture.  The bone is osteoporotic and significantly displaced.  There is also fracture of the greater tuberosity of the humerus which most likely involves the rotator cuff causing dysfunction.  She is neurovascularly intact.  The surgical intervention will be performed once she has been medically stabilized.  I have discussed this case with my mentor Dr. Uriel Montenegro at the fracture service at [facility] fracture service.  We have discussed with the patient about a potential transfer to that service because they perform a lot more frequently these types of fracture repair.  The patient understands and accepts and she is going to talk to her family and let me know how she would like to proceed with stabilization of this proximal humerus fracture.  Nonoperative management has already been started for the patient with the application of a Bower cast brace and neurovascular checks.  She is responding well to the injection of morphine for pain control.  Patient will talk to her family and we will  further with regards to the definitive line of management of this very complex fracture.       Discharge Plan:  Unknown at this point  to home      Date: 10/1/2022    Jarrett Salas MD      Time: Critical care 50 min

## 2022-10-01 NOTE — PLAN OF CARE
Goal Outcome Evaluation:      Patient alert oriented able to make needs known. Pain controlled with po pain medication. Patient has splint to left arm.

## 2022-10-01 NOTE — DISCHARGE SUMMARY
[facility] Medicine Services  DISCHARGE SUMMARY    Patient Name: Gi Velez  : 1950  MRN: [id number]    Date of Admission: 2022  Discharge Diagnosis: Left humerus spiral fracture  Date of Discharge:  10/1/2022  Primary Care Physician: Mercedez Guevara MD      Presenting Problem:   Fall, initial encounter [W19.XXXA]  Left supracondylar humerus fracture, closed, initial encounter [S42.412A]  Closed fracture of shaft of left humerus, unspecified fracture morphology, initial encounter [S42.302A]    Active and Resolved Hospital Problems:  Active Hospital Problems    Diagnosis POA   • **Left supracondylar humerus fracture, closed, initial encounter [S42.412A] Yes   • Hyperlipidemia [E78.5] Yes   • Thrombocytopenia (HCC) [D69.6] Yes   • Obstructive sleep apnea [G47.33] Yes   • Diabetic peripheral neuropathy (HCC) [E11.42] Yes   • Diabetes mellitus, type II (HCC) [E11.9] Yes      Resolved Hospital Problems   No resolved problems to display.         Hospital Course     Hospital Course:  Gi Velez is a 72 y.o. female with PMHx of HTN, ITP, and DM who presented to [facility] after mechanical fall and hit left arm on bookcase causing pain.  She presented to [facility] where x-ray showed displaced spiral type fracture in mid-shaft of left humerus complicated by oblique mildly displaced fracture involving the humeral head and neck.  Ortho assessed patient and recommended transfer to .  Patient accepted to  under Dr. Montenegro.  Patient discharged to  for surgical assessment on 10/1/2022.        DISCHARGE Follow Up Recommendations for labs and diagnostics:      - transfer to  under Dr. Montenegro      Day of Discharge     Vital Signs:  Temp:  [98.1 °F (36.7 °C)-99.5 °F (37.5 °C)] 98.1 °F (36.7 °C)  Heart Rate:  [66-94] 75  Resp:  [14-17] 14  BP: (115-146)/(45-95) 138/54  Flow (L/min):  [2] 2    Physical Exam:  Physical Exam   Constitutional:       Appearance: Normal appearance. NAD.    HENT:      Head: Normocephalic.      Right Ear: External ear normal.      Left Ear: External ear normal.      Nose: Nose normal.      Mouth/Throat:      Pharynx: Oropharynx is clear.   Eyes:      Extraocular Movements: Extraocular movements intact.   Cardiovascular:      Rate and Rhythm: Normal rate and regular rhythm.      Pulses: Normal pulses.      Heart sounds: Normal heart sounds.   Pulmonary:      Effort: Pulmonary effort is normal.      Breath sounds: Normal breath sounds.   Abdominal:      General: Bowel sounds are normal.      Palpations: Abdomen is soft.   Musculoskeletal:         General: Normal range of motion.      Cervical back: Normal range of motion.   Skin:     General: Skin is warm and dry.      Comments: Left arm is in splint  Neurological:      Mental Status: She is alert and oriented to person, place, and time.   Psychiatric:         Mood and Affect: Mood normal.         Behavior: Behavior normal.      Pertinent  and/or Most Recent Results     LAB RESULTS:      Lab 10/01/22  0404 09/30/22  0345 09/29/22  1933   WBC 7.90 6.60 8.20   HEMOGLOBIN 11.6* 11.4* 13.3   HEMATOCRIT 37.3 36.1 41.6   PLATELETS 91* 88* 97*   NEUTROS ABS 3.90 3.00 4.90   LYMPHS ABS 2.70 2.80 2.50   MONOS ABS 0.90 0.60 0.50   EOS ABS 0.20 0.20 0.20   MCV 88.1 85.9 85.7   PROTIME  --   --  10.9   APTT  --   --  26.2*         Lab 10/01/22  0404 09/30/22  0345 09/29/22  1933   SODIUM 137 139 141   POTASSIUM 4.4 4.6 4.3   CHLORIDE 101 105 107   CO2 23.0 22.0 22.0   ANION GAP 13.0 12.0 12.0   BUN 24* 23 20   CREATININE 0.91 1.00 0.92   EGFR 66.3 59.2* 65.5   GLUCOSE 173* 271* 83   CALCIUM 8.9 8.7 10.3             Lab 09/29/22 1933   PROTIME 10.9   INR 1.06             Lab 09/30/22 0345   VITAMIN B 12 >2,000*         Brief Urine Lab Results  (Last result in the past 365 days)      Color   Clarity   Blood   Leuk Est   Nitrite   Protein   CREAT   Urine HCG        08/01/22 0743             63.3             Microbiology Results  (last 10 days)     ** No results found for the last 240 hours. **          XR Humerus Left    Result Date: 9/29/2022  Impression: 1. Displaced spiral type fracture involving the mid shaft of the left humerus. 2. Oblique mildly displaced fracture involving the humeral head and neck.  Electronically Signed By-Chris Sanches MD On:9/29/2022 7:47 PM This report was finalized on [id number] by  Chris Sanches MD.    XR Forearm 2 View Left    Result Date: 9/29/2022  Impression: Negative for fracture.  Electronically Signed By-Chris Sanches MD On:9/29/2022 7:48 PM This report was finalized on [id number] by  Chris Sanches MD.    XR Chest 1 View    Result Date: 9/29/2022  Impression: 1. No acute cardiopulmonary process. 2. Left humeral fractures noted.  Electronically Signed By-Chris Sanches MD On:9/29/2022 7:48 PM This report was finalized on [id number] by  Chris Sanches MD.                  Labs Pending at Discharge:  Pending Labs     Order Current Status    Vitamin B1, Whole Blood In process          Procedures Performed           Consults:   Consults     Date and Time Order Name Status Description    9/29/2022  7:38 PM Hospitalist (on-call MD unless specified)      9/29/2022  7:11 PM Ortho (on-call MD unless specified)              Discharge Details        Discharge Medications      Continue These Medications      Instructions Start Date   aspirin 81 MG EC tablet   81 mg, Oral, Daily      atorvastatin 40 MG tablet  Commonly known as: LIPITOR   40 mg, Oral, Daily      B-12 PO   B-12      benazepril 10 MG tablet  Commonly known as: LOTENSIN   10 mg, Oral, Daily      BIOTIN PO   BIOTIN      Cholecalciferol 25 MCG (1000 UT) capsule   VITAMIN D 1000 UNIT ORAL CAPS      CINNAMON PO   CINNAMON CAPS      clotrimazole-betamethasone 1-0.05 % cream  Commonly known as: LOTRISONE   No dose, route, or frequency recorded.      dilTIAZem 120 MG 24 hr capsule  Commonly known as: TIAZAC   120 mg, Oral, Daily      dilTIAZem 120 MG  tablet  Commonly known as: CARDIZEM   120 mg, Oral, Daily      docusate sodium 100 MG capsule  Commonly known as: COLACE   100 mg, Oral, 2 Times Daily      doxylamine 25 MG tablet  Commonly known as: UNISOM   25 mg, Oral, Nightly PRN      FISH OIL PO   FISH OIL CAPS      glimepiride 4 MG tablet  Commonly known as: AMARYL   Take 2 tab ac breakfast.      Insulin Pen Needle 32G X 4 MM misc  Commonly known as: NovoFine Plus   1 Piece, Subcutaneous, Nightly      metFORMIN  MG 24 hr tablet  Commonly known as: GLUCOPHAGE-XR   Take 2  tabs ac supper      ONE TOUCH ULTRA TEST test strip  Generic drug: glucose blood   USE 1 STRIP EVERY 8 HOURS. E11.9      pregabalin 150 MG capsule  Commonly known as: LYRICA   TAKE 1 CAPSULE BY MOUTH TWICE A DAY      Tresiba FlexTouch 200 UNIT/ML solution pen-injector pen injection  Generic drug: Insulin Degludec   20 Units, Subcutaneous, Daily      triamcinolone 0.5 % cream  Commonly known as: KENALOG   No dose, route, or frequency recorded.      VITAMIN C PO   VITAMIN C             Allergies   Allergen Reactions   • Adhesive Tape Other (See Comments)     Blisters     • Other Hives     topomax and bee stings   • Topiramate Hives         Discharge Disposition: Transfer to [facility] under Dr. Montenegro  Condition on discharge: GOOD        Diet:  Hospital:  Diet Order   Procedures   • Diet Diabetic/Consistent Carbs; Diabetic - Consistent Carb         Discharge Activity:         CODE STATUS:  Code Status and Medical Interventions:   Ordered at: 09/29/22 2045     Level Of Support Discussed With:    Patient     Code Status (Patient has no pulse and is not breathing):    CPR (Attempt to Resuscitate)     Medical Interventions (Patient has pulse or is breathing):    Full Support         Future Appointments   Date Time Provider Department Center   10/10/2022  9:30 AM Mercedez Guevara MD [facility]   2/27/2023  7:30 AM LAB  [facility]  [facility] None   3/6/2023  7:45 AM  Vic Cerna MD [facility]           Time spent on Discharge including face to face service:  25 minutes    This patient has been examined wearing appropriate Personal Protective Equipment and discussed with Patient . 10/01/22      Signature: Electronically signed by Argelia Auguste DO, 10/01/22, 12:06 PM EDT.

## 2022-10-03 NOTE — CASE MANAGEMENT/SOCIAL WORK
Case Management Discharge Note      Final Note: HOME    Provided Post Acute Provider List?: Yes  Post Acute Provider List: Home Health  Delivered To: Patient  Method of Delivery: In person    Selected Continued Care - Discharged on 10/1/2022 Admission date: 9/29/2022 - Discharge disposition: Short Term Hospital (DC - External)             Transportation Services  Private: Car    Final Discharge Disposition Code: 01 - home or self-care

## 2022-10-04 LAB — VIT B1 BLD-SCNC: 176.8 NMOL/L (ref 66.5–200)

## 2022-10-07 PROBLEM — Z23 NEED FOR PNEUMOCOCCAL VACCINATION: Status: ACTIVE | Noted: 2022-10-07

## 2022-10-07 PROBLEM — Z00.00 INITIAL MEDICARE ANNUAL WELLNESS VISIT: Status: ACTIVE | Noted: 2022-10-07

## 2022-10-07 PROBLEM — Z23 NEED FOR INFLUENZA VACCINATION: Status: ACTIVE | Noted: 2022-10-07

## 2022-10-07 NOTE — PROGRESS NOTES
The ABCs of the Annual Wellness Visit  Initial Medicare Wellness Visit    Chief Complaint   Patient presents with   • Medicare Wellness-subsequent   • Fall   • Shoulder Injury     left   • Hospital Follow Up Visit     Subjective   History of Present Illness:  Gi Velez is a 72 y.o. female who presents for an Initial Medicare Wellness Visit.    Patient states she had a fall on 9/29 with an injury to her left arm and shoulder, which resulted with surgery at [facility] to the left upper arm.      Patient had fall on 9/29/2022 resulting in left upper arm fracture.  She was initially evaluated by orthopedist, Dr. Jarrett Salas at [facility].she was transferred to [facility] and is now status post left supracondylar humeral fracture with open reduction internal fixation with Dr. Uriel Montenegro at the [facility]. Follow up appointment needed in about 1 week, cannot find evidence that this is scheduled.     She does follow with endocrinology, Dr. Cerna, next appointment March 6, 2023.    Have had evaluation with Dr Noyola for tachycardia, no recnet symptoms no future follow up.     Do have SOA occasionally.     Dr Sellers follows Thrombocytopenia     The following portions of the patient's history were reviewed and   updated as appropriate: allergies, current medications, past family history, past medical history, past social history, past surgical history and problem list.     Compared to one year ago, the patient feels her physical   health is the same.    Compared to one year ago, the patient feels her mental   health is the same.    Recent Hospitalizations:  This patient has had a [facility] admission record on file within the last 365 days.    Current Medical Providers:  Patient Care Team:  Mercedez Guevara MD as PCP - General (Family Medicine)    Outpatient Medications Prior to Visit   Medication Sig Dispense Refill   • Ascorbic Acid (VITAMIN C PO) VITAMIN C     •  atorvastatin (LIPITOR) 40 MG tablet Take 1 tablet by mouth Daily. 90 tablet 2   • benazepril (LOTENSIN) 10 MG tablet Take 10 mg by mouth Daily.     • BIOTIN PO BIOTIN     • Cholecalciferol 1000 units capsule VITAMIN D 1000 UNIT ORAL CAPS     • CINNAMON PO CINNAMON CAPS     • clotrimazole-betamethasone (LOTRISONE) 1-0.05 % cream      • Cyanocobalamin (B-12 PO) B-12     • dilTIAZem (CARDIZEM) 120 MG tablet Take 120 mg by mouth Daily.     • dilTIAZem (TIAZAC) 120 MG 24 hr capsule Take 1 capsule by mouth Daily. 30 capsule 5   • docusate sodium (COLACE) 100 MG capsule Take 100 mg by mouth 2 (Two) Times a Day.     • doxylamine (UNISOM) 25 MG tablet Take 25 mg by mouth At Night As Needed for Sleep.     • glimepiride (AMARYL) 4 MG tablet Take 2 tab ac breakfast.  3   • HYDROcodone-acetaminophen (NORCO) 5-325 MG per tablet Take 1 tablet by mouth Every 4 (Four) Hours As Needed. for pain     • Insulin Pen Needle (NovoFine Plus) 32G X 4 MM misc Inject 1 Piece under the skin into the appropriate area as directed Every Night. 100 each 3   • metFORMIN ER (GLUCOPHAGE-XR) 500 MG 24 hr tablet Take 2  tabs ac supper 180 tablet 3   • Omega-3 Fatty Acids (FISH OIL PO) FISH OIL CAPS     • ondansetron (ZOFRAN) 4 MG tablet Take 1 tablet by mouth Every 8 (Eight) Hours.     • ONE TOUCH ULTRA TEST test strip USE 1 STRIP EVERY 8 HOURS. E11.9  5   • pregabalin (LYRICA) 150 MG capsule TAKE 1 CAPSULE BY MOUTH TWICE A  capsule 0   • TRESIBA FLEXTOUCH 200 UNIT/ML solution pen-injector Inject 20 Units under the skin into the appropriate area as directed Daily.  5   • triamcinolone (KENALOG) 0.5 % cream      • aspirin 81 MG EC tablet Take 81 mg by mouth Daily.       No facility-administered medications prior to visit.       Opioid medication/s are on active medication list.  and I have evaluated her active treatment plan and pain score trends (see table).  Vitals:    10/10/22 0934   PainSc: 10-Worst pain ever     I have reviewed the chart for  "potential of high risk medication and harmful drug interactions in the elderly.            Aspirin is on active medication list. Aspirin use is not indicated based on review of current medical condition/s. Risk of harm outweighs potential benefits. Patient instructed to discontinue this medication.  .      Patient Active Problem List   Diagnosis   • Thrombocytopenia (HCC)   • Acute ITP (HCC)   • Vitamin B12 deficiency   • Iron deficiency   • Vitamin D deficiency   • Obstructive sleep apnea   • Hyperlipidemia   • Goiter   • Encounter for long-term (current) use of other medications   • Diabetic peripheral neuropathy (HCC)   • Diabetes mellitus, type II (HCC)   • Nuclear cataract   • Glaucoma suspect   • Epiretinal membrane   • Chronic fatigue   • Insomnia   • Overweight with body mass index (BMI) of 26 to 26.9 in adult   • Left supracondylar humerus fracture, closed, initial encounter   • Initial Medicare annual wellness visit   • Need for pneumococcal vaccination   • Need for influenza vaccination     Advance Care Planning  Advance Directive is not on file.  ACP discussion was declined by the patient. Patient does not have an advance directive, information provided.          Objective       Vitals:    10/10/22 0934   BP: 140/60   BP Location: Right arm   Patient Position: Sitting   Cuff Size: Adult   Pulse: 81   Resp: 18   Temp: 97.7 °F (36.5 °C)   SpO2: 95%   Weight: 65.2 kg (143 lb 12.8 oz)   Height: 157.5 cm (62\")   PainSc: 10-Worst pain ever     Estimated body mass index is 26.3 kg/m² as calculated from the following:    Height as of this encounter: 157.5 cm (62\").    Weight as of this encounter: 65.2 kg (143 lb 12.8 oz).    BMI is >= 25 and <30. (Overweight) The following options were offered after discussion;: weight loss educational material (shared in after visit summary), exercise counseling/recommendations and nutrition counseling/recommendations    ATTENTION  What is the year: correct  What is the month " of the year: correct  What is the day of the week?: correct  What is the date?: correct  MEMORY  Repeat address three times, only score third attempt: [address], [place]: 5  HOW MANY ANIMALS DID THE PATIENT NAME  Verbal Fluency -- Animal Names (0-25): 22+  CLOCK DRAWING  Clock Drawing: All Correct  MEMORY RECALL  Tell me what you remember about that name and address we were repeating at the beginnin  ACE TOTAL SCORE  Total ACE Score - <25/30 strongly suggests cognitive impairment; <21/30 almost certainly shows dementia: 25      Does the patient have evidence of cognitive impairment? No    Physical Exam  Vitals and nursing note reviewed.   Constitutional:       General: She is not in acute distress.     Appearance: Normal appearance. She is well-developed.   HENT:      Head: Normocephalic and atraumatic.   Eyes:      Conjunctiva/sclera: Conjunctivae normal.      Pupils: Pupils are equal, round, and reactive to light.   Neck:      Thyroid: No thyromegaly.      Vascular: No carotid bruit or JVD.      Comments: No thyromegaly or masses  Cardiovascular:      Rate and Rhythm: Normal rate and regular rhythm.      Heart sounds: Normal heart sounds. No murmur heard.  Pulmonary:      Breath sounds: Normal breath sounds. No wheezing or rales.   Musculoskeletal:         General: Normal range of motion.      Cervical back: Normal range of motion. No tenderness.      Comments: Left arm in shoulder sling custom orthotic extending to the thumb and arm wrapped in bandages   Lymphadenopathy:      Cervical: No cervical adenopathy.   Skin:     General: Skin is warm and dry.      Findings: No rash.   Neurological:      Mental Status: She is alert and oriented to person, place, and time.   Psychiatric:         Mood and Affect: Mood normal.         Behavior: Behavior normal.      Comments: Tangential       Lab Results   Component Value Date    TRIG 298 (H) 2022    HDL 32 (L) 2022    LDL 78  08/01/2022    VLDL 49 (H) 08/01/2022    HGBA1C 8.8 (H) 08/01/2022          HEALTH RISK ASSESSMENT    Smoking Status:  Social History     Tobacco Use   Smoking Status Never   Smokeless Tobacco Never     Alcohol Consumption:  Social History     Substance and Sexual Activity   Alcohol Use Yes    Comment: rare occasion     Fall Risk Screen:    KARSTEN Fall Risk Assessment was completed, and patient is at HIGH risk for falls. Assessment completed on:10/10/2022    Depression Screen:   PHQ-2/PHQ-9 Depression Screening 10/10/2022   Little Interest or Pleasure in Doing Things 0-->not at all   Feeling Down, Depressed or Hopeless 0-->not at all   PHQ-9: Brief Depression Severity Measure Score 0       Health Habits and Functional and Cognitive Screening:  Functional & Cognitive Status 10/10/2022   Do you have difficulty preparing food and eating? No   Do you have difficulty bathing yourself, getting dressed or grooming yourself? No   Do you have difficulty using the toilet? No   Do you have difficulty moving around from place to place? No   Do you have trouble with steps or getting out of a bed or a chair? No   Current Diet Well Balanced Diet   Dental Exam Not up to date   Eye Exam Up to date   Exercise (times per week) 3 times per week   Current Exercises Include House Cleaning;Walking;Yard Work   Do you need help using the phone?  No   Are you deaf or do you have serious difficulty hearing?  No   Do you need help with transportation? No   Do you need help shopping? No   Do you need help preparing meals?  No   Do you need help with housework?  No   Do you need help with laundry? No   Do you need help taking your medications? No   Do you need help managing money? No   Do you ever drive or ride in a car without wearing a seat belt? No   Have you felt unusual stress, anger or loneliness in the last month? No   Who do you live with? Child   If you need help, do you have trouble finding someone available to you? No   Do you have  difficulty concentrating, remembering or making decisions? No       Age-appropriate Screening Schedule:  Refer to the list below for future screening recommendations based on patient's age, sex and/or medical conditions. Orders for these recommended tests are listed in the plan section. The patient has been provided with a written plan.    Health Maintenance   Topic Date Due   • DIABETIC FOOT EXAM  Never done   • DIABETIC EYE EXAM  11/08/2022   • HEMOGLOBIN A1C  02/01/2023   • LIPID PANEL  08/01/2023   • URINE MICROALBUMIN  08/01/2023   • DXA SCAN  08/23/2023   • MAMMOGRAM  03/07/2024   • TDAP/TD VACCINES (2 - Td or Tdap) 08/15/2029   • INFLUENZA VACCINE  Completed   • ZOSTER VACCINE  Completed            Assessment & Plan   CMS Preventative Services Quick Reference  Risk Factors Identified During Encounter  Dementia/Memory   Fall Risk-High or Moderate  Immunizations Discussed/Encouraged (specific Immunizations; Influenza and Prevnar 20 (Pneumococcal 20-valent conjugate)  Obesity/Overweight   The above risks/problems have been discussed with the patient.  Follow up actions/plans if indicated are seen below in the Assessment/Plan Section.  Pertinent information has been shared with the patient in the After Visit Summary.    Diagnoses and all orders for this visit:    1. Initial Medicare annual wellness visit (Primary)    2. Hospital discharge follow-up    3. Overweight with body mass index (BMI) of 26 to 26.9 in adult  Assessment & Plan:  Patient's (Body mass index is 26.3 kg/m².) indicates that they are overweight with health conditions that include diabetes mellitus . Weight is unchanged. BMI is is above average; BMI management plan is completed. We discussed portion control and increasing exercise.       4. Need for influenza vaccination  -     Fluzone High-Dose 65+yrs    5. Need for pneumococcal vaccination  -     Cancel: Pneumococcal Conjugate Vaccine 20-Valent All    6. Need for hepatitis C screening test  -      Hepatitis C Antibody; Future    7. Closed supracondylar fracture of left humerus, initial encounter    The patient was counseled regarding nutrition, physical activity, healthy weight, injury prevention, immunizations and preventative health screenings.    Flu today, will get Prevnar 20 and bivalant covid booster at a pharmacy  Follow through with PT for left shoulder and to also address shuffling of feet.     Hep C antibiody  When get labs in February for Dr Cerna    Follow Up:  Return in about 3 months (around 1/10/2023) for Recheck, htn.     An After Visit Summary and PPPS were made available to the patient.

## 2022-10-10 ENCOUNTER — OFFICE VISIT (OUTPATIENT)
Dept: FAMILY MEDICINE CLINIC | Facility: CLINIC | Age: 72
End: 2022-10-10

## 2022-10-10 VITALS
BODY MASS INDEX: 26.46 KG/M2 | OXYGEN SATURATION: 95 % | DIASTOLIC BLOOD PRESSURE: 60 MMHG | WEIGHT: 143.8 LBS | SYSTOLIC BLOOD PRESSURE: 140 MMHG | TEMPERATURE: 97.7 F | RESPIRATION RATE: 18 BRPM | HEART RATE: 81 BPM | HEIGHT: 62 IN

## 2022-10-10 DIAGNOSIS — Z00.00 INITIAL MEDICARE ANNUAL WELLNESS VISIT: Primary | ICD-10-CM

## 2022-10-10 DIAGNOSIS — Z23 NEED FOR INFLUENZA VACCINATION: ICD-10-CM

## 2022-10-10 DIAGNOSIS — S42.412A CLOSED SUPRACONDYLAR FRACTURE OF LEFT HUMERUS, INITIAL ENCOUNTER: ICD-10-CM

## 2022-10-10 DIAGNOSIS — Z23 NEED FOR PNEUMOCOCCAL VACCINATION: ICD-10-CM

## 2022-10-10 DIAGNOSIS — Z11.59 NEED FOR HEPATITIS C SCREENING TEST: ICD-10-CM

## 2022-10-10 DIAGNOSIS — Z09 HOSPITAL DISCHARGE FOLLOW-UP: ICD-10-CM

## 2022-10-10 DIAGNOSIS — E66.3 OVERWEIGHT WITH BODY MASS INDEX (BMI) OF 26 TO 26.9 IN ADULT: ICD-10-CM

## 2022-10-10 PROCEDURE — G0008 ADMIN INFLUENZA VIRUS VAC: HCPCS | Performed by: FAMILY MEDICINE

## 2022-10-10 PROCEDURE — 90662 IIV NO PRSV INCREASED AG IM: CPT | Performed by: FAMILY MEDICINE

## 2022-10-10 PROCEDURE — G0439 PPPS, SUBSEQ VISIT: HCPCS | Performed by: FAMILY MEDICINE

## 2022-10-10 PROCEDURE — 1159F MED LIST DOCD IN RCRD: CPT | Performed by: FAMILY MEDICINE

## 2022-10-10 RX ORDER — ONDANSETRON 4 MG/1
4 TABLET, FILM COATED ORAL EVERY 8 HOURS
COMMUNITY
Start: 2022-10-05 | End: 2023-03-06

## 2022-10-10 RX ORDER — HYDROCODONE BITARTRATE AND ACETAMINOPHEN 5; 325 MG/1; MG/1
1 TABLET ORAL EVERY 4 HOURS PRN
COMMUNITY
Start: 2022-10-05 | End: 2023-03-10

## 2022-10-10 NOTE — ASSESSMENT & PLAN NOTE
Patient's (Body mass index is 26.3 kg/m².) indicates that they are overweight with health conditions that include diabetes mellitus . Weight is unchanged. BMI is is above average; BMI management plan is completed. We discussed portion control and increasing exercise.

## 2022-10-22 DIAGNOSIS — E11.42 DIABETIC PERIPHERAL NEUROPATHY: ICD-10-CM

## 2022-10-24 RX ORDER — GLIMEPIRIDE 4 MG/1
TABLET ORAL
Qty: 180 TABLET | Refills: 3 | Status: SHIPPED | OUTPATIENT
Start: 2022-10-24

## 2022-10-24 RX ORDER — BENAZEPRIL HYDROCHLORIDE 10 MG/1
10 TABLET ORAL DAILY
Qty: 90 TABLET | Refills: 3 | Status: SHIPPED | OUTPATIENT
Start: 2022-10-24 | End: 2023-03-10

## 2022-10-24 RX ORDER — PREGABALIN 150 MG/1
CAPSULE ORAL
Qty: 180 CAPSULE | Refills: 1 | Status: SHIPPED | OUTPATIENT
Start: 2022-10-24

## 2022-10-24 NOTE — TELEPHONE ENCOUNTER
Caller: Gi Velez    Relationship: Self    Best call back number: [phone]     Requested Prescriptions:   Requested Prescriptions     Pending Prescriptions Disp Refills   • benazepril (LOTENSIN) 10 MG tablet       Sig: Take 1 tablet by mouth Daily.        Pharmacy where request should be sent: [id number] - [facility] [place] [address] [phone] [facility] [phone] FX     Additional details provided by patient:  PATIENT HAS 3 DAYS LEFT    Does the patient have less than a 3 day supply:  [] Yes  [x] No    Gela Nash Rep   10/24/22 09:52 EDT

## 2023-02-27 ENCOUNTER — LAB (OUTPATIENT)
Dept: LAB | Facility: HOSPITAL | Age: 73
End: 2023-02-27
Payer: MEDICARE

## 2023-02-27 DIAGNOSIS — E78.2 MIXED HYPERLIPIDEMIA: ICD-10-CM

## 2023-02-27 DIAGNOSIS — Z79.4 TYPE 2 DIABETES MELLITUS WITH DIABETIC POLYNEUROPATHY, WITH LONG-TERM CURRENT USE OF INSULIN: ICD-10-CM

## 2023-02-27 DIAGNOSIS — E11.42 TYPE 2 DIABETES MELLITUS WITH DIABETIC POLYNEUROPATHY, WITH LONG-TERM CURRENT USE OF INSULIN: ICD-10-CM

## 2023-02-27 LAB
ALBUMIN SERPL-MCNC: 4.4 G/DL (ref 3.5–5.2)
ALBUMIN/GLOB SERPL: 1.2 G/DL
ALP SERPL-CCNC: 96 U/L (ref 39–117)
ALT SERPL W P-5'-P-CCNC: 33 U/L (ref 1–33)
ANION GAP SERPL CALCULATED.3IONS-SCNC: 10 MMOL/L (ref 5–15)
AST SERPL-CCNC: 25 U/L (ref 1–32)
BILIRUB SERPL-MCNC: 0.4 MG/DL (ref 0–1.2)
BUN SERPL-MCNC: 12 MG/DL (ref 8–23)
BUN/CREAT SERPL: 11.8 (ref 7–25)
CALCIUM SPEC-SCNC: 9.7 MG/DL (ref 8.6–10.5)
CHLORIDE SERPL-SCNC: 107 MMOL/L (ref 98–107)
CO2 SERPL-SCNC: 28 MMOL/L (ref 22–29)
CREAT SERPL-MCNC: 1.02 MG/DL (ref 0.57–1)
EGFRCR SERPLBLD CKD-EPI 2021: 57.8 ML/MIN/1.73
GLOBULIN UR ELPH-MCNC: 3.7 GM/DL
GLUCOSE SERPL-MCNC: 85 MG/DL (ref 65–99)
HBA1C MFR BLD: 9.1 % (ref 3.5–5.6)
POTASSIUM SERPL-SCNC: 4.1 MMOL/L (ref 3.5–5.2)
PROT SERPL-MCNC: 8.1 G/DL (ref 6–8.5)
SODIUM SERPL-SCNC: 145 MMOL/L (ref 136–145)

## 2023-02-27 PROCEDURE — 80053 COMPREHEN METABOLIC PANEL: CPT

## 2023-02-27 PROCEDURE — 36415 COLL VENOUS BLD VENIPUNCTURE: CPT

## 2023-02-27 PROCEDURE — 83036 HEMOGLOBIN GLYCOSYLATED A1C: CPT

## 2023-03-01 DIAGNOSIS — I10 PRIMARY HYPERTENSION: ICD-10-CM

## 2023-03-01 RX ORDER — DILTIAZEM HYDROCHLORIDE 120 MG/1
CAPSULE, EXTENDED RELEASE ORAL
Qty: 90 CAPSULE | Refills: 1 | OUTPATIENT
Start: 2023-03-01

## 2023-03-06 ENCOUNTER — OFFICE VISIT (OUTPATIENT)
Dept: ENDOCRINOLOGY | Facility: CLINIC | Age: 73
End: 2023-03-06
Payer: MEDICARE

## 2023-03-06 VITALS
HEIGHT: 62 IN | WEIGHT: 141.2 LBS | HEART RATE: 75 BPM | SYSTOLIC BLOOD PRESSURE: 124 MMHG | DIASTOLIC BLOOD PRESSURE: 60 MMHG | BODY MASS INDEX: 25.98 KG/M2

## 2023-03-06 DIAGNOSIS — Z79.4 TYPE 2 DIABETES MELLITUS WITH DIABETIC POLYNEUROPATHY, WITH LONG-TERM CURRENT USE OF INSULIN: Primary | ICD-10-CM

## 2023-03-06 DIAGNOSIS — E78.2 MIXED HYPERLIPIDEMIA: ICD-10-CM

## 2023-03-06 DIAGNOSIS — E11.42 TYPE 2 DIABETES MELLITUS WITH DIABETIC POLYNEUROPATHY, WITH LONG-TERM CURRENT USE OF INSULIN: Primary | ICD-10-CM

## 2023-03-06 DIAGNOSIS — E55.9 VITAMIN D DEFICIENCY: ICD-10-CM

## 2023-03-06 LAB — GLUCOSE BLDC GLUCOMTR-MCNC: 132 MG/DL (ref 70–105)

## 2023-03-06 PROCEDURE — 82962 GLUCOSE BLOOD TEST: CPT | Performed by: INTERNAL MEDICINE

## 2023-03-06 PROCEDURE — 99214 OFFICE O/P EST MOD 30 MIN: CPT | Performed by: INTERNAL MEDICINE

## 2023-03-06 NOTE — PROGRESS NOTES
[facility]        Patient Care Team:  Mercedez Guevara MD as PCP - General (Family Medicine)    Chief Complaint:    Chief Complaint   Patient presents with   • Diabetes     Type 2, , fasting, Tresiba 22 units this am         Subjective   Here for diabetes f/u  Blood sugars: 80's in am  Taking Tresiba in am  Exercise program: did PT  Taking vit D    Interval History:     Patient Complaints: tripped & fell, fractured L arm. ORIF @ [facility]. Had steroids  Patient Denies: hypoglycemia  History taken from: patient    Review of Systems:   Review of Systems   HENT: Negative for trouble swallowing.    Eyes: Negative for blurred vision.   Gastrointestinal: Negative for nausea.   Endocrine: Negative for polyuria.   Neurological: Negative for headache.     No wt change since last visit    Objective     Vital Signs  Heart Rate:  [75] 75  BP: (124)/(60) 124/60    Physical Exam:     General Appearance:    Alert, cooperative, in no acute distress   Head:    Normocephalic, without obvious abnormality, atraumatic   Eyes:            Lids and lashes normal, conjunctivae and sclerae normal, no   icterus, no pallor, corneas clear, PERRLA   Throat:   No oral lesions,  oral mucosa moist   Neck:   36 cm in circumference, thyroid firm, no carotid bruit   Lungs:     Clear    Heart:    Regular rhythm and normal rate   Chest Wall:    No abnormalities observed   Abdomen:     Normal bowel sounds, soft                 Extremities:   Scar L arm from surgery, no edema               Pulses:   Pulses palpable and equal bilaterally   Skin:   Dry   Neurologic:  DTR absent, able to feel the 10g monofilament          Results Review:    I have reviewed the patient's new clinical results, labs & imaging.    Medication Review:   Prior to Admission medications    Medication Sig Start Date End Date Taking? Authorizing Provider   Ascorbic Acid (VITAMIN C PO) VITAMIN C 4/10/18  Yes Provider, MD Ros   atorvastatin  (LIPITOR) 40 MG tablet Take 1 tablet by mouth Daily. 8/15/22  Yes Mercedez Guevara MD   benazepril (LOTENSIN) 10 MG tablet Take 1 tablet by mouth Daily. 10/24/22  Yes Mercedez Guevara MD   BIOTIN PO BIOTIN 4/10/18  Yes Provider, MD Ros   Cholecalciferol 1000 units capsule VITAMIN D 1000 UNIT ORAL CAPS 3/31/15  Yes ProviderRos MD   CINNAMON PO CINNAMON CAPS 4/10/18  Yes Provider, MD Ros   clotrimazole-betamethasone (LOTRISONE) 1-0.05 % cream  12/26/21  Yes Provider, MD Ros   Cyanocobalamin (B-12 PO) B-12 4/10/18  Yes Provider, MD Ros   dilTIAZem (CARDIZEM) 120 MG tablet Take 1 tablet by mouth Daily. 5/8/22  Yes ProviderRos MD   dilTIAZem (TIAZAC) 120 MG 24 hr capsule Take 1 capsule by mouth Daily. 8/22/22  Yes Mercedez Guevara MD   docusate sodium (COLACE) 100 MG capsule Take 1 capsule by mouth 2 (Two) Times a Day.   Yes Provider, MD Ros   doxylamine (UNISOM) 25 MG tablet Take 1 tablet by mouth At Night As Needed for Sleep.   Yes Provider, MD Ros   glimepiride (AMARYL) 4 MG tablet Take 2 tab ac breakfast. 10/24/22  Yes Vic Cerna MD   HYDROcodone-acetaminophen (NORCO) 5-325 MG per tablet Take 1 tablet by mouth Every 4 (Four) Hours As Needed. for pain 10/5/22  Yes Provider, MD Ros   Insulin Pen Needle (NovoFine Plus) 32G X 4 MM misc Inject 1 Piece under the skin into the appropriate area as directed Every Night. 8/15/22  Yes Mercedez Guevara MD   metFORMIN ER (GLUCOPHAGE-XR) 500 MG 24 hr tablet Take 2  tabs ac supper 9/22/22  Yes Vic Cerna MD   Omega-3 Fatty Acids (FISH OIL PO) FISH OIL CAPS 4/11/17  Yes Provider, MD Ros   ONE TOUCH ULTRA TEST test strip 1 each by Other route Daily. 6/19/19  Yes Provider, MD Ros   pregabalin (LYRICA) 150 MG capsule TAKE 1 CAPSULE BY MOUTH TWICE A DAY 10/24/22  Yes Mercedez Guevara MD   TRESIBA FLEXTOUCH 200 UNIT/ML solution  pen-injector Inject 22 Units under the skin into the appropriate area as directed Daily. 4/23/19  Yes Provider, MD Ros   ondansetron (ZOFRAN) 4 MG tablet Take 1 tablet by mouth Every 8 (Eight) Hours. 10/5/22 3/6/23  ProviderRos MD   triamcinolone (KENALOG) 0.5 % cream  10/14/21 3/6/23  Provider, MD Ros       Lab Results (most recent)     Procedure Component Value Units Date/Time    POC Glucose [[id number]]  (Abnormal) Collected: 03/06/23 0755    Specimen: Blood Updated: 03/06/23 0756     Glucose 132 mg/dL      Comment: Serial Number: [id number]:  [id number]           Lab Results   Component Value Date    HGBA1C 9.1 (H) 02/27/2023    HGBA1C 8.8 (H) 08/01/2022    HGBA1C 8.3 (H) 12/22/2021      Lab Results   Component Value Date    GLUCOSE 85 02/27/2023    BUN 12 02/27/2023    CREATININE 1.02 (H) 02/27/2023    EGFRIFNONA 56 (L) 02/07/2022    BCR 11.8 02/27/2023    K 4.1 02/27/2023    CO2 28.0 02/27/2023    CALCIUM 9.7 02/27/2023    ALBUMIN 4.4 02/27/2023    LABIL2 1.4 07/05/2018    AST 25 02/27/2023    ALT 33 02/27/2023    CHOL 159 08/01/2022    LDL 78 08/01/2022    HDL 32 (L) 08/01/2022    TRIG 298 (H) 08/01/2022     Lab Results   Component Value Date    TSH 3.550 08/01/2022    QLYJ74SL 47.9 08/01/2022       Assessment & Plan     Diagnoses and all orders for this visit:    1. Type 2 diabetes mellitus with diabetic polyneuropathy, with long-term current use of insulin (HCC) (Primary)  -     Hemoglobin A1c; Future  -     Microalbumin / Creatinine Urine Ratio - Urine, Clean Catch; Future    2. Mixed hyperlipidemia  -     Comprehensive Metabolic Panel; Future  -     Lipid Panel; Future  -     TSH; Future    3. Vitamin D deficiency  -     Vitamin D,25-Hydroxy; Future    Other orders  -     POC Glucose    Glucose control worse. Will check lipid & vit D status next visit.    Increase exercise as able.  Continue diabetes & lipid meds & vit D supplements.  Call if blood sugars are running under  100 or over 200.  Will order Dale CGMS. It is medically necessary & indicated for this pt.        Vic Cerna MD  03/06/23  17:57 EST

## 2023-03-06 NOTE — PROGRESS NOTES
Chief Complaint  Hypertension    History of Present Illness  Gi Velez presents today for hypertension.    Hypertension  Patient does check blood pressure at home. Range from 102/59 to 125/51  Patient denies chest pain, blurred vision,fatigue, palpitations, shortness of breath or headaches.  Patient states medications is working well.     Patient does follow with Dr. Cerna for diabetes.  Most recent encounter 3/6/2023 has been reviewed.  Patient is planning to start continuous glucose monitoring with freestyle julian, assuming insurance will cover it.    Did not get prevnar 20. Did get p[facility] covid booster at [facility] pharmacy on 1/6/2023    Left humerus fracture persistent left arm pain. Follow up with Dr Chawla August 1st. Have completed physical therapy.  Abduction is somewhat limited but patient is satisfied with range of motion    Current Outpatient Medications on File Prior to Visit   Medication Sig   • Ascorbic Acid (VITAMIN C PO) VITAMIN C   • atorvastatin (LIPITOR) 40 MG tablet Take 1 tablet by mouth Daily.   • BIOTIN PO BIOTIN   • Cholecalciferol 1000 units capsule VITAMIN D 1000 UNIT ORAL CAPS   • CINNAMON PO CINNAMON CAPS   • clotrimazole-betamethasone (LOTRISONE) 1-0.05 % cream    • Cyanocobalamin (B-12 PO) B-12   • dilTIAZem (TIAZAC) 120 MG 24 hr capsule Take 1 capsule by mouth Daily.   • docusate sodium (COLACE) 100 MG capsule Take 1 capsule by mouth 2 (Two) Times a Day.   • doxylamine (UNISOM) 25 MG tablet Take 1 tablet by mouth At Night As Needed for Sleep.   • glimepiride (AMARYL) 4 MG tablet Take 2 tab ac breakfast.   • Insulin Pen Needle (NovoFine Plus) 32G X 4 MM misc Inject 1 Piece under the skin into the appropriate area as directed Every Night.   • metFORMIN ER (GLUCOPHAGE-XR) 500 MG 24 hr tablet Take 2  tabs ac supper   • Omega-3 Fatty Acids (FISH OIL PO) FISH OIL CAPS   • ONE TOUCH ULTRA TEST test strip 1 each by Other route Daily.   • pregabalin (LYRICA) 150 MG capsule TAKE  "1 CAPSULE BY MOUTH TWICE A DAY   • TRESIBA FLEXTOUCH 200 UNIT/ML solution pen-injector Inject 22 Units under the skin into the appropriate area as directed Daily.     No current facility-administered medications on file prior to visit.       Objective   Vital Signs:   /62 (BP Location: Right arm, Patient Position: Sitting, Cuff Size: Adult)   Pulse 91   Temp 98 °F (36.7 °C) (Temporal)   Resp 18   Ht 157.5 cm (62\")   Wt 64.2 kg (141 lb 8 oz)   SpO2 97% Comment: room air  BMI 25.88 kg/m²       Physical Exam  Vitals and nursing note reviewed.   Constitutional:       General: She is not in acute distress.     Appearance: She is well-developed.   HENT:      Head: Normocephalic and atraumatic.   Cardiovascular:      Rate and Rhythm: Normal rate and regular rhythm.      Heart sounds: No murmur heard.  Pulmonary:      Effort: Pulmonary effort is normal.      Breath sounds: Normal breath sounds. No wheezing.   Musculoskeletal:      Comments: Reduce abduction of left shoulder   Skin:     General: Skin is warm and dry.      Findings: No rash.   Neurological:      Mental Status: She is alert and oriented to person, place, and time.            No visits with results within 1 Day(s) from this visit.   Latest known visit with results is:   Office Visit on 03/06/2023   Component Date Value Ref Range Status   • Glucose 03/06/2023 132 (H)  70 - 105 mg/dL Final    Serial Number: [id number]:  [id number]     A1C Last 3 Results    HGBA1C Last 3 Results 8/1/22 2/27/23   Hemoglobin A1C 8.8 (A) 9.1 (A)   (A) Abnormal value            Lab Results   Component Value Date    CHOL 159 08/01/2022    TRIG 298 (H) 08/01/2022    HDL 32 (L) 08/01/2022    LDL 78 08/01/2022     Lab Results   Component Value Date    TSH 3.550 08/01/2022     Lab Results   Component Value Date    GLUCOSE 85 02/27/2023    BUN 12 02/27/2023    CREATININE 1.02 (H) 02/27/2023    EGFRIFNONA 56 (L) 02/07/2022    BCR 11.8 02/27/2023    K 4.1 02/27/2023    CO2 " 28.0 02/27/2023    CALCIUM 9.7 02/27/2023    ALBUMIN 4.4 02/27/2023    LABIL2 1.4 07/05/2018    AST 25 02/27/2023    ALT 33 02/27/2023     Lab Results   Component Value Date    WBC 7.90 10/01/2022    HGB 11.6 (L) 10/01/2022    HCT 37.3 10/01/2022    MCV 88.1 10/01/2022    PLT 91 (L) 10/01/2022                      Assessment and Plan    Diagnoses and all orders for this visit:    1. Primary hypertension (Primary)  -     benazepril (LOTENSIN) 5 MG tablet; Take 1 tablet by mouth Daily.  Dispense: 90 tablet; Refill: 3    2. Overweight (BMI 25.0-29.9)    3. Type 2 diabetes mellitus with diabetic polyneuropathy, with long-term current use of insulin (HCC)    4. Need for pneumococcal vaccination  -     Pneumococcal Conjugate Vaccine 20-Valent All    5. Encounter for screening mammogram for malignant neoplasm of breast  -     Mammo Screening Digital Tomosynthesis Bilateral With CAD; Future    HTN with some symptomatically low readings at home,   Have had tachycardia in past will leave diltiazem at 120 daily and reduce benazepril from 10 to 5 mg daily.    Wanting handicap placard prone to trip and falls due to neuropathy.  Form has been completed.    Diabetes, A1c has increased to 9.1 this is followed by Dr. Cerna, did encourage her to get the continuous glucose monitor to facilitate tighter glycemic control.  .    Medications Discontinued During This Encounter   Medication Reason   • dilTIAZem (CARDIZEM) 120 MG tablet *Therapy completed   • HYDROcodone-acetaminophen (NORCO) 5-325 MG per tablet *Therapy completed   • benazepril (LOTENSIN) 10 MG tablet          Follow Up     Return in about 3 months (around 6/10/2023) for Recheck.    Patient was given instructions and counseling regarding her condition or for health maintenance advice. Please see specific information pulled into the AVS if appropriate.

## 2023-03-06 NOTE — PATIENT INSTRUCTIONS
Increase exercise as able.  Continue diabetes & lipid meds & vit D supplements.  Call if blood sugars are running under 100 or over 200.  F/u in 6 months, with fasting labs prior.

## 2023-03-07 ENCOUNTER — TELEPHONE (OUTPATIENT)
Dept: ENDOCRINOLOGY | Facility: CLINIC | Age: 73
End: 2023-03-07
Payer: MEDICARE

## 2023-03-07 NOTE — TELEPHONE ENCOUNTER
SAULOO for Dale initiated in [facility]. In Dr Cerna's inbox for signature  Will fax once signed.

## 2023-03-10 ENCOUNTER — OFFICE VISIT (OUTPATIENT)
Dept: FAMILY MEDICINE CLINIC | Facility: CLINIC | Age: 73
End: 2023-03-10
Payer: MEDICARE

## 2023-03-10 VITALS
HEART RATE: 91 BPM | OXYGEN SATURATION: 97 % | RESPIRATION RATE: 18 BRPM | HEIGHT: 62 IN | SYSTOLIC BLOOD PRESSURE: 118 MMHG | DIASTOLIC BLOOD PRESSURE: 62 MMHG | WEIGHT: 141.5 LBS | BODY MASS INDEX: 26.04 KG/M2 | TEMPERATURE: 98 F

## 2023-03-10 DIAGNOSIS — I10 PRIMARY HYPERTENSION: Primary | ICD-10-CM

## 2023-03-10 DIAGNOSIS — E66.3 OVERWEIGHT (BMI 25.0-29.9): ICD-10-CM

## 2023-03-10 DIAGNOSIS — Z12.31 ENCOUNTER FOR SCREENING MAMMOGRAM FOR MALIGNANT NEOPLASM OF BREAST: ICD-10-CM

## 2023-03-10 DIAGNOSIS — Z23 NEED FOR PNEUMOCOCCAL VACCINATION: ICD-10-CM

## 2023-03-10 DIAGNOSIS — E11.42 TYPE 2 DIABETES MELLITUS WITH DIABETIC POLYNEUROPATHY, WITH LONG-TERM CURRENT USE OF INSULIN: ICD-10-CM

## 2023-03-10 DIAGNOSIS — Z79.4 TYPE 2 DIABETES MELLITUS WITH DIABETIC POLYNEUROPATHY, WITH LONG-TERM CURRENT USE OF INSULIN: ICD-10-CM

## 2023-03-10 PROBLEM — S42.202A CLOSED FRACTURE OF PROXIMAL END OF LEFT HUMERUS: Status: ACTIVE | Noted: 2022-11-03

## 2023-03-10 PROCEDURE — 90677 PCV20 VACCINE IM: CPT | Performed by: FAMILY MEDICINE

## 2023-03-10 PROCEDURE — G0009 ADMIN PNEUMOCOCCAL VACCINE: HCPCS | Performed by: FAMILY MEDICINE

## 2023-03-10 PROCEDURE — 99214 OFFICE O/P EST MOD 30 MIN: CPT | Performed by: FAMILY MEDICINE

## 2023-03-10 RX ORDER — BENAZEPRIL HYDROCHLORIDE 5 MG/1
5 TABLET, FILM COATED ORAL DAILY
Qty: 90 TABLET | Refills: 3 | Status: SHIPPED | OUTPATIENT
Start: 2023-03-10

## 2023-03-26 DIAGNOSIS — E78.2 MIXED HYPERLIPIDEMIA: ICD-10-CM

## 2023-03-26 DIAGNOSIS — I10 PRIMARY HYPERTENSION: ICD-10-CM

## 2023-03-27 RX ORDER — ATORVASTATIN CALCIUM 40 MG/1
TABLET, FILM COATED ORAL
Qty: 90 TABLET | Refills: 3 | Status: SHIPPED | OUTPATIENT
Start: 2023-03-27

## 2023-03-27 RX ORDER — DILTIAZEM HYDROCHLORIDE 120 MG/1
CAPSULE, EXTENDED RELEASE ORAL
Qty: 90 CAPSULE | Refills: 3 | Status: SHIPPED | OUTPATIENT
Start: 2023-03-27

## 2023-03-28 ENCOUNTER — HOSPITAL ENCOUNTER (OUTPATIENT)
Dept: MAMMOGRAPHY | Facility: HOSPITAL | Age: 73
Discharge: HOME | End: 2023-03-28
Admitting: FAMILY MEDICINE
Payer: MEDICARE

## 2023-03-28 DIAGNOSIS — Z12.31 ENCOUNTER FOR SCREENING MAMMOGRAM FOR MALIGNANT NEOPLASM OF BREAST: ICD-10-CM

## 2023-03-28 PROCEDURE — 77067 SCR MAMMO BI INCL CAD: CPT

## 2023-03-28 PROCEDURE — 77063 BREAST TOMOSYNTHESIS BI: CPT

## 2023-04-03 ENCOUNTER — TELEPHONE (OUTPATIENT)
Dept: FAMILY MEDICINE CLINIC | Facility: CLINIC | Age: 73
End: 2023-04-03
Payer: MEDICARE

## 2023-04-03 ENCOUNTER — HOSPITAL ENCOUNTER (OUTPATIENT)
Dept: MAMMOGRAPHY | Facility: HOSPITAL | Age: 73
Discharge: HOME | End: 2023-04-03
Admitting: FAMILY MEDICINE
Payer: MEDICARE

## 2023-04-03 DIAGNOSIS — R92.1 BREAST CALCIFICATIONS: ICD-10-CM

## 2023-04-03 PROCEDURE — G0279 TOMOSYNTHESIS, MAMMO: HCPCS

## 2023-04-03 PROCEDURE — 77065 DX MAMMO INCL CAD UNI: CPT

## 2023-04-03 NOTE — TELEPHONE ENCOUNTER
Evelyn with [facility] called and said that the radiologist recommends Gi have a stereotatic biopsy done, they scheduled this for 4/11/23. She is putting the order in if Dr. Guevara would sign the order in the system. Thanks Olinda

## 2023-04-11 ENCOUNTER — HOSPITAL ENCOUNTER (OUTPATIENT)
Dept: MAMMOGRAPHY | Facility: HOSPITAL | Age: 73
Discharge: HOME | End: 2023-04-11
Payer: MEDICARE

## 2023-04-11 DIAGNOSIS — R92.1 CALCIFICATION OF RIGHT BREAST: ICD-10-CM

## 2023-04-11 DIAGNOSIS — R92.8 ABNORMAL MAMMOGRAM OF RIGHT BREAST: ICD-10-CM

## 2023-04-11 PROCEDURE — 76098 X-RAY EXAM SURGICAL SPECIMEN: CPT

## 2023-04-11 PROCEDURE — 88342 IMHCHEM/IMCYTCHM 1ST ANTB: CPT | Performed by: FAMILY MEDICINE

## 2023-04-11 PROCEDURE — 0 LIDOCAINE 1 % SOLUTION: Performed by: FAMILY MEDICINE

## 2023-04-11 PROCEDURE — 88360 TUMOR IMMUNOHISTOCHEM/MANUAL: CPT | Performed by: FAMILY MEDICINE

## 2023-04-11 PROCEDURE — 88305 TISSUE EXAM BY PATHOLOGIST: CPT | Performed by: FAMILY MEDICINE

## 2023-04-11 PROCEDURE — A4648 IMPLANTABLE TISSUE MARKER: HCPCS

## 2023-04-11 RX ORDER — LIDOCAINE HYDROCHLORIDE AND EPINEPHRINE 10; 10 MG/ML; UG/ML
13 INJECTION, SOLUTION INFILTRATION; PERINEURAL ONCE
Status: COMPLETED | OUTPATIENT
Start: 2023-04-11 | End: 2023-04-11

## 2023-04-11 RX ORDER — LIDOCAINE HYDROCHLORIDE 10 MG/ML
3 INJECTION, SOLUTION INFILTRATION; PERINEURAL ONCE
Status: COMPLETED | OUTPATIENT
Start: 2023-04-11 | End: 2023-04-11

## 2023-04-11 RX ADMIN — LIDOCAINE HYDROCHLORIDE AND EPINEPHRINE 13 ML: 10; 10 INJECTION, SOLUTION INFILTRATION; PERINEURAL at 10:39

## 2023-04-11 RX ADMIN — Medication 2 ML: at 10:39

## 2023-04-12 ENCOUNTER — TELEPHONE (OUTPATIENT)
Dept: MAMMOGRAPHY | Facility: HOSPITAL | Age: 73
End: 2023-04-12
Payer: MEDICARE

## 2023-04-12 NOTE — TELEPHONE ENCOUNTER
Gi stated she had a little discomfort but ibuprofen took the discomfort away. No other issues. She has my contact information if she has any questions or concerns,

## 2023-04-13 LAB
LAB AP CASE REPORT: NORMAL
LAB AP DIAGNOSIS COMMENT: NORMAL
LAB AP SPECIAL STAINS: NORMAL
PATH REPORT.FINAL DX SPEC: NORMAL
PATH REPORT.GROSS SPEC: NORMAL

## 2023-04-17 ENCOUNTER — TELEPHONE (OUTPATIENT)
Dept: FAMILY MEDICINE CLINIC | Facility: CLINIC | Age: 73
End: 2023-04-17

## 2023-04-17 DIAGNOSIS — D05.11 DUCTAL CARCINOMA IN SITU OF RIGHT BREAST: Primary | ICD-10-CM

## 2023-04-17 NOTE — TELEPHONE ENCOUNTER
Caller: Gi Velez    Relationship: Self    Best call back number: [phone]     Caller requesting test results: [phone]     What test was performed:  BIOPSY    When was the test performed:  04/11/2023    Where was the test performed:[facility]    Additional notes: PATIENT IS CALLING TO REQUEST THE RESULTS OF THE BIOPSY DONE ON 04/11/2023.    PLEASE ADVISE.

## 2023-04-17 NOTE — TELEPHONE ENCOUNTER
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  Negative for invasive carcinoma    Special Stains    Test: ER/WV, Paraffin Block, IHC  Final Diagnosis: High-grade ductal carcinoma in situ, comedo-type with microcalcifications  Site of biopsy/source: Right breast  Estrogen Receptor Assay (IHC): Positive   Progesterone Receptor Assay (IHC): Positive         Spoke with patient and informed of breast biopsy pathology.  Patient already sees Dr. Sellers for hematologic diagnosis and would like to see him as a oncologist.  She is willing to go to [place] to see Dr. Myers as a breast surgeon.  Placing both of these as urgent referrals.

## 2023-04-19 ENCOUNTER — TELEPHONE (OUTPATIENT)
Dept: FAMILY MEDICINE CLINIC | Facility: CLINIC | Age: 73
End: 2023-04-19
Payer: MEDICARE

## 2023-04-19 DIAGNOSIS — D05.11 DUCTAL CARCINOMA IN SITU OF RIGHT BREAST: Primary | ICD-10-CM

## 2023-04-19 NOTE — TELEPHONE ENCOUNTER
May from [facility] called to let us know that they are unable to complete the referral. She did say that Keyla Bailey would possibly be able to complete it. The number for their office is [phone]. Thank you.

## 2023-04-19 NOTE — TELEPHONE ENCOUNTER
They are saying they are unable to schedule this patient with Dr. Myers, they cannot make the appointment so they can not complete the referral, they suggested another office to refer patient too, they routed the referral back.  Please advise on what you advise to do.

## 2023-04-19 NOTE — TELEPHONE ENCOUNTER
Are there any breast surgeons at [facility]?  If not, we do other providers in this office refer to for breast cancer/breast surgery?

## 2023-04-19 NOTE — TELEPHONE ENCOUNTER
"Not sure why this is returning to me, I placed referral, I do not know how to \"complete\" the referral.  Patient is anxious about her new diagnosis of breast cancer and needs to see a breast surgeon as soon as possible.  "

## 2023-04-20 NOTE — TELEPHONE ENCOUNTER
I have placed order for referral to Dr. Ruiz  Patient needs to be informed that Dr. Myers could not accept the referral and that we are referring her to a surgeon who does breast Work [facility].

## 2023-04-24 DIAGNOSIS — E11.42 DIABETIC PERIPHERAL NEUROPATHY: ICD-10-CM

## 2023-04-25 RX ORDER — PREGABALIN 150 MG/1
CAPSULE ORAL
Qty: 180 CAPSULE | Refills: 0 | Status: SHIPPED | OUTPATIENT
Start: 2023-04-25

## 2023-05-01 ENCOUNTER — OFFICE VISIT (OUTPATIENT)
Dept: SURGERY | Facility: CLINIC | Age: 73
End: 2023-05-01
Payer: MEDICARE

## 2023-05-01 ENCOUNTER — PATIENT OUTREACH (OUTPATIENT)
Dept: ONCOLOGY | Facility: CLINIC | Age: 73
End: 2023-05-01
Payer: MEDICARE

## 2023-05-01 VITALS
BODY MASS INDEX: 26.17 KG/M2 | HEIGHT: 62 IN | SYSTOLIC BLOOD PRESSURE: 151 MMHG | OXYGEN SATURATION: 98 % | RESPIRATION RATE: 16 BRPM | WEIGHT: 142.2 LBS | TEMPERATURE: 98.4 F | DIASTOLIC BLOOD PRESSURE: 84 MMHG | HEART RATE: 87 BPM

## 2023-05-01 DIAGNOSIS — D05.11 BREAST NEOPLASM, TIS (DCIS), RIGHT: Primary | ICD-10-CM

## 2023-05-01 PROCEDURE — 99204 OFFICE O/P NEW MOD 45 MIN: CPT | Performed by: SURGERY

## 2023-05-01 PROCEDURE — 1160F RVW MEDS BY RX/DR IN RCRD: CPT | Performed by: SURGERY

## 2023-05-01 PROCEDURE — 1159F MED LIST DOCD IN RCRD: CPT | Performed by: SURGERY

## 2023-05-01 RX ORDER — CHLORHEXIDINE GLUCONATE 0.12 MG/ML
15 RINSE ORAL EVERY 12 HOURS SCHEDULED
OUTPATIENT
Start: 2023-05-01

## 2023-05-01 RX ORDER — SODIUM CHLORIDE 9 MG/ML
100 INJECTION, SOLUTION INTRAVENOUS CONTINUOUS
OUTPATIENT
Start: 2023-05-01

## 2023-05-02 PROBLEM — D05.11 BREAST NEOPLASM, TIS (DCIS), RIGHT: Status: ACTIVE | Noted: 2023-05-02

## 2023-05-03 ENCOUNTER — PATIENT ROUNDING (BHMG ONLY) (OUTPATIENT)
Dept: SURGERY | Facility: CLINIC | Age: 73
End: 2023-05-03
Payer: MEDICARE

## 2023-05-04 NOTE — PROGRESS NOTES
GENERAL SURGERY CONSULTATION NOTE    Consult requested by: Dr. Guevara    Patient Care Team:  Mercedez Guevara MD as PCP - General (Family Medicine)    Reason for consult: Right breast DCIS    Subjective     Patient is a 73 y.o. female presents with a new diagnosis of right breast DCIS.  Patient was found to have an area of calcifications on screening mammogram despite not noticing any changes in her breasts.  On 4/3/2023 she underwent a diagnostic mammogram which demonstrated subtle calcifications within the medial aspect of the right breast and stereotactic biopsy was recommended.  Patient underwent stereotactic biopsy on 4/11/2023 which demonstrated high-grade ductal carcinoma in situ, comedo type with microcalcifications.  Negative for any invasive carcinoma.  This was noted to be ER positive at 80% and [place] positive at 90%.  The patient is adopted and does not know her family history.  She reports that starting periods at age 8, went through menopause at 33 due to a complete hysterectomy.  She has been pregnant 2 times with 2 live births her first child was born when she was 17 years old.  She never breast-fed.  Took birth control pills for approximately 15 years, and took Premarin for 10+ years..     Review of Systems   Genitourinary: Negative for breast discharge, breast lump and breast pain.   Hematological: Negative for adenopathy. Does not bruise/bleed easily.        History  Past Medical History:   Diagnosis Date   • Diabetes mellitus     diagnosed in the 1990's   • Hyperlipidemia 2000   • Hypertension 2018   • Irregular heart beat 2005   • Lymphocytosis 12/2009   • Neuropathy     diagnosed in the 1990's   • Thrombocytopenia 12/2009   • Type 2 diabetes mellitus 1998     Past Surgical History:   Procedure Laterality Date   • COLONOSCOPY W/ BIOPSIES  07/2021   • HYSTERECTOMY      in the 1980's-increased bleeding   • SHOULDER SURGERY Left 10/03/2022    and arm   • TUBAL ABDOMINAL LIGATION    "    Family History   Adopted: Yes   Problem Relation Age of Onset   • No Known Problems Other      Social History     Tobacco Use   • Smoking status: Never     Passive exposure: Never   • Smokeless tobacco: Never   Vaping Use   • Vaping Use: Never used   Substance Use Topics   • Alcohol use: Yes     Comment: rare occasion   • Drug use: Never     (Not in a hospital admission)    Allergies:  Adhesive tape, Other, and Topiramate    Objective     Vital Signs  /84 (BP Location: Left arm, Patient Position: Sitting, Cuff Size: Adult)   Pulse 87   Temp 98.4 °F (36.9 °C) (Infrared)   Resp 16   Ht 157.5 cm (62\")   Wt 64.5 kg (142 lb 3.2 oz)   SpO2 98%   BMI 26.01 kg/m²      Physical Exam  Vitals reviewed. Exam conducted with a chaperone present.   Constitutional:       Appearance: She is well-developed.   HENT:      Head: Normocephalic and atraumatic.   Eyes:      Pupils: Pupils are equal, round, and reactive to light.   Cardiovascular:      Rate and Rhythm: Normal rate and regular rhythm.   Pulmonary:      Effort: Pulmonary effort is normal.      Breath sounds: Normal breath sounds.   Chest:      Comments: Both breasts were examined the upright and supine position.  Both breasts exhibit normal shape and contour without any dominant masses bilaterally there are no overlying skin changes, no dimpling, no skin thickening, no rashes, no discharge from the nipple.  Abdominal:      General: There is no distension.      Palpations: Abdomen is soft.      Tenderness: There is no abdominal tenderness.      Hernia: No hernia is present.   Musculoskeletal:         General: Normal range of motion.      Cervical back: Normal range of motion.   Lymphadenopathy:      Cervical: No cervical adenopathy.      Upper Body:      Right upper body: No supraclavicular or axillary adenopathy.      Left upper body: No supraclavicular or axillary adenopathy.   Skin:     General: Skin is warm and dry.      Findings: No rash.   Neurological: "      Mental Status: She is alert and oriented to person, place, and time.         Results Review:   Lab Results (last 24 hours)     ** No results found for the last 24 hours. **        No radiology results for the last day      I reviewed the patient's new imaging results and agree with the interpretation.  I reviewed the patient's other test results and agree with the interpretation    Assessment & Plan   Right breast DCIS    We reviewed the patient's imaging, and her pathology reports in detail.  We discussed that breast cancer is treated in a multidisciplinary fashion, with input from radiation oncology, medical oncology, and general surgery.  We discussed that the patient's case will be discussed at a multidisciplinary tumor board meeting held every other week.  We then discussed the diagnosis of breast cancer and that most breast cancers arise either from the ducts or from the lobules.  Using visual aids, we discussed the difference between invasive and in situ disease, especially whether or not the lymph nodes need to be evaluated.  Usually, with in situ disease, lymph nodes are not examined.  However, examining the lymph nodes should be considered if the area of concern is widespread or if it is high-grade.  We also discussed hormone receptors, and discussed that there are medications which can be given if the hormone receptors were positive which can be used to treat the cancer, and to provide protection in not only the breast with cancer, but the contralateral breast as well.  The surgical options were discussed with the patient which include breast conservation therapy (lumpectomy with radiation) and mastectomy.  With regards to mastectomy, we discussed that reconstruction may be performed either at the time of the surgery, or in a delayed fashion.  We discussed that the survival benefit between these 2 procedures are equivalent, and therefore they are considered oncologically appropriate.  However, some  women will choose one versus the other for a multitude of factors.  There are times when a lumpectomy is not a feasible option, these include but are not limited to tumor to breast ratio, the location of the tumor, previous radiation to the chest wall, or connective tissue disorders which would make radiation treatment ineffective.  This patient has elected to undergo breast conservation therapy and understands that this may include a lumpectomy with evaluation of her lymph nodes, followed by radiation.  There may or may not be a role for chemotherapy or hormonal therapy following her surgery as well.  This will be managed by medical oncology.  We discussed that in order for the surgery to be effective we will need to have clear surgical margins, and that if the margins are positive, then she may require further surgical excision versus a prolonged course of radiation.  The risk, benefits, and alternatives to surgery were discussed with the patient which include but are not limited to: bleeding, infection, damage to nerves/blood vessels, and pain.    The patient understands, and agrees to proceed with wire localized right breast lumpectomy.    I discussed the patients findings and my recommendations with the patient.     Joe Ruiz MD  05/04/23  17:44 EDT

## 2023-05-04 NOTE — H&P (VIEW-ONLY)
GENERAL SURGERY CONSULTATION NOTE    Consult requested by: Dr. Guevara    Patient Care Team:  Mercedez Guevara MD as PCP - General (Family Medicine)    Reason for consult: Right breast DCIS    Subjective     Patient is a 73 y.o. female presents with a new diagnosis of right breast DCIS.  Patient was found to have an area of calcifications on screening mammogram despite not noticing any changes in her breasts.  On 4/3/2023 she underwent a diagnostic mammogram which demonstrated subtle calcifications within the medial aspect of the right breast and stereotactic biopsy was recommended.  Patient underwent stereotactic biopsy on 4/11/2023 which demonstrated high-grade ductal carcinoma in situ, comedo type with microcalcifications.  Negative for any invasive carcinoma.  This was noted to be ER positive at 80% and MI positive at 90%.  The patient is adopted and does not know her family history.  She reports that starting periods at age 8, went through menopause at 33 due to a complete hysterectomy.  She has been pregnant 2 times with 2 live births her first child was born when she was 17 years old.  She never breast-fed.  Took birth control pills for approximately 15 years, and took Premarin for 10+ years..     Review of Systems   Genitourinary: Negative for breast discharge, breast lump and breast pain.   Hematological: Negative for adenopathy. Does not bruise/bleed easily.        History  Past Medical History:   Diagnosis Date   • Diabetes mellitus     diagnosed in the 1990's   • Hyperlipidemia 2000   • Hypertension 2018   • Irregular heart beat 2005   • Lymphocytosis 12/2009   • Neuropathy     diagnosed in the 1990's   • Thrombocytopenia 12/2009   • Type 2 diabetes mellitus 1998     Past Surgical History:   Procedure Laterality Date   • COLONOSCOPY W/ BIOPSIES  07/2021   • HYSTERECTOMY      in the 1980's-increased bleeding   • SHOULDER SURGERY Left 10/03/2022    and arm   • TUBAL ABDOMINAL LIGATION    "    Family History   Adopted: Yes   Problem Relation Age of Onset   • No Known Problems Other      Social History     Tobacco Use   • Smoking status: Never     Passive exposure: Never   • Smokeless tobacco: Never   Vaping Use   • Vaping Use: Never used   Substance Use Topics   • Alcohol use: Yes     Comment: rare occasion   • Drug use: Never     (Not in a hospital admission)    Allergies:  Adhesive tape, Other, and Topiramate    Objective     Vital Signs  /84 (BP Location: Left arm, Patient Position: Sitting, Cuff Size: Adult)   Pulse 87   Temp 98.4 °F (36.9 °C) (Infrared)   Resp 16   Ht 157.5 cm (62\")   Wt 64.5 kg (142 lb 3.2 oz)   SpO2 98%   BMI 26.01 kg/m²      Physical Exam  Vitals reviewed. Exam conducted with a chaperone present.   Constitutional:       Appearance: She is well-developed.   HENT:      Head: Normocephalic and atraumatic.   Eyes:      Pupils: Pupils are equal, round, and reactive to light.   Cardiovascular:      Rate and Rhythm: Normal rate and regular rhythm.   Pulmonary:      Effort: Pulmonary effort is normal.      Breath sounds: Normal breath sounds.   Chest:      Comments: Both breasts were examined the upright and supine position.  Both breasts exhibit normal shape and contour without any dominant masses bilaterally there are no overlying skin changes, no dimpling, no skin thickening, no rashes, no discharge from the nipple.  Abdominal:      General: There is no distension.      Palpations: Abdomen is soft.      Tenderness: There is no abdominal tenderness.      Hernia: No hernia is present.   Musculoskeletal:         General: Normal range of motion.      Cervical back: Normal range of motion.   Lymphadenopathy:      Cervical: No cervical adenopathy.      Upper Body:      Right upper body: No supraclavicular or axillary adenopathy.      Left upper body: No supraclavicular or axillary adenopathy.   Skin:     General: Skin is warm and dry.      Findings: No rash.   Neurological: "      Mental Status: She is alert and oriented to person, place, and time.         Results Review:   Lab Results (last 24 hours)     ** No results found for the last 24 hours. **        No radiology results for the last day      I reviewed the patient's new imaging results and agree with the interpretation.  I reviewed the patient's other test results and agree with the interpretation    Assessment & Plan   Right breast DCIS    We reviewed the patient's imaging, and her pathology reports in detail.  We discussed that breast cancer is treated in a multidisciplinary fashion, with input from radiation oncology, medical oncology, and general surgery.  We discussed that the patient's case will be discussed at a multidisciplinary tumor board meeting held every other week.  We then discussed the diagnosis of breast cancer and that most breast cancers arise either from the ducts or from the lobules.  Using visual aids, we discussed the difference between invasive and in situ disease, especially whether or not the lymph nodes need to be evaluated.  Usually, with in situ disease, lymph nodes are not examined.  However, examining the lymph nodes should be considered if the area of concern is widespread or if it is high-grade.  We also discussed hormone receptors, and discussed that there are medications which can be given if the hormone receptors were positive which can be used to treat the cancer, and to provide protection in not only the breast with cancer, but the contralateral breast as well.  The surgical options were discussed with the patient which include breast conservation therapy (lumpectomy with radiation) and mastectomy.  With regards to mastectomy, we discussed that reconstruction may be performed either at the time of the surgery, or in a delayed fashion.  We discussed that the survival benefit between these 2 procedures are equivalent, and therefore they are considered oncologically appropriate.  However, some  women will choose one versus the other for a multitude of factors.  There are times when a lumpectomy is not a feasible option, these include but are not limited to tumor to breast ratio, the location of the tumor, previous radiation to the chest wall, or connective tissue disorders which would make radiation treatment ineffective.  This patient has elected to undergo breast conservation therapy and understands that this may include a lumpectomy with evaluation of her lymph nodes, followed by radiation.  There may or may not be a role for chemotherapy or hormonal therapy following her surgery as well.  This will be managed by medical oncology.  We discussed that in order for the surgery to be effective we will need to have clear surgical margins, and that if the margins are positive, then she may require further surgical excision versus a prolonged course of radiation.  The risk, benefits, and alternatives to surgery were discussed with the patient which include but are not limited to: bleeding, infection, damage to nerves/blood vessels, and pain.    The patient understands, and agrees to proceed with wire localized right breast lumpectomy.    I discussed the patients findings and my recommendations with the patient.     Joe Ruiz MD  05/04/23  17:44 EDT

## 2023-05-11 RX ORDER — DIPHENHYDRAMINE HCL 25 MG
25 CAPSULE ORAL AS NEEDED
COMMUNITY

## 2023-05-15 ENCOUNTER — LAB (OUTPATIENT)
Dept: LAB | Facility: HOSPITAL | Age: 73
End: 2023-05-15
Payer: MEDICARE

## 2023-05-15 ENCOUNTER — HOSPITAL ENCOUNTER (OUTPATIENT)
Dept: CARDIOLOGY | Facility: HOSPITAL | Age: 73
Discharge: HOME | End: 2023-05-15
Payer: MEDICARE

## 2023-05-15 ENCOUNTER — HOSPITAL ENCOUNTER (OUTPATIENT)
Dept: GENERAL RADIOLOGY | Facility: HOSPITAL | Age: 73
Discharge: HOME | End: 2023-05-15
Payer: MEDICARE

## 2023-05-15 DIAGNOSIS — D05.11 BREAST NEOPLASM, TIS (DCIS), RIGHT: ICD-10-CM

## 2023-05-15 LAB
ALBUMIN SERPL-MCNC: 4.4 G/DL (ref 3.5–5.2)
ALBUMIN/GLOB SERPL: 1.4 G/DL
ALP SERPL-CCNC: 99 U/L (ref 39–117)
ALT SERPL W P-5'-P-CCNC: 21 U/L (ref 1–33)
ANION GAP SERPL CALCULATED.3IONS-SCNC: 14.9 MMOL/L (ref 5–15)
AST SERPL-CCNC: 16 U/L (ref 1–32)
BASOPHILS # BLD AUTO: 0.05 10*3/MM3 (ref 0–0.2)
BASOPHILS NFR BLD AUTO: 0.7 % (ref 0–1.5)
BILIRUB SERPL-MCNC: 0.3 MG/DL (ref 0–1.2)
BUN SERPL-MCNC: 21 MG/DL (ref 8–23)
BUN/CREAT SERPL: 21.9 (ref 7–25)
CALCIUM SPEC-SCNC: 9.9 MG/DL (ref 8.6–10.5)
CHLORIDE SERPL-SCNC: 103 MMOL/L (ref 98–107)
CO2 SERPL-SCNC: 23.1 MMOL/L (ref 22–29)
CREAT SERPL-MCNC: 0.96 MG/DL (ref 0.57–1)
DEPRECATED RDW RBC AUTO: 43.9 FL (ref 37–54)
EGFRCR SERPLBLD CKD-EPI 2021: 61.8 ML/MIN/1.73
EOSINOPHIL # BLD AUTO: 0.25 10*3/MM3 (ref 0–0.4)
EOSINOPHIL NFR BLD AUTO: 3.4 % (ref 0.3–6.2)
ERYTHROCYTE [DISTWIDTH] IN BLOOD BY AUTOMATED COUNT: 15.2 % (ref 12.3–15.4)
GLOBULIN UR ELPH-MCNC: 3.1 GM/DL
GLUCOSE SERPL-MCNC: 103 MG/DL (ref 65–99)
HCT VFR BLD AUTO: 42.9 % (ref 34–46.6)
HGB BLD-MCNC: 14 G/DL (ref 12–15.9)
LYMPHOCYTES # BLD AUTO: 3.08 10*3/MM3 (ref 0.7–3.1)
LYMPHOCYTES NFR BLD AUTO: 42.2 % (ref 19.6–45.3)
MCH RBC QN AUTO: 26.3 PG (ref 26.6–33)
MCHC RBC AUTO-ENTMCNC: 32.6 G/DL (ref 31.5–35.7)
MCV RBC AUTO: 80.5 FL (ref 79–97)
MONOCYTES # BLD AUTO: 0.6 10*3/MM3 (ref 0.1–0.9)
MONOCYTES NFR BLD AUTO: 8.2 % (ref 5–12)
NEUTROPHILS NFR BLD AUTO: 3.3 10*3/MM3 (ref 1.7–7)
NEUTROPHILS NFR BLD AUTO: 45.2 % (ref 42.7–76)
PLATELET # BLD AUTO: 126 10*3/MM3 (ref 140–450)
PMV BLD AUTO: 12.7 FL (ref 6–12)
POTASSIUM SERPL-SCNC: 4.3 MMOL/L (ref 3.5–5.2)
PROT SERPL-MCNC: 7.5 G/DL (ref 6–8.5)
QT INTERVAL: 396 MS
RBC # BLD AUTO: 5.33 10*6/MM3 (ref 3.77–5.28)
SODIUM SERPL-SCNC: 141 MMOL/L (ref 136–145)
WBC NRBC COR # BLD: 7.3 10*3/MM3 (ref 3.4–10.8)

## 2023-05-15 PROCEDURE — 80053 COMPREHEN METABOLIC PANEL: CPT

## 2023-05-15 PROCEDURE — 85025 COMPLETE CBC W/AUTO DIFF WBC: CPT

## 2023-05-15 PROCEDURE — 36415 COLL VENOUS BLD VENIPUNCTURE: CPT

## 2023-05-15 PROCEDURE — 93005 ELECTROCARDIOGRAM TRACING: CPT | Performed by: SURGERY

## 2023-05-15 PROCEDURE — 71046 X-RAY EXAM CHEST 2 VIEWS: CPT

## 2023-05-16 ENCOUNTER — TRANSCRIBE ORDERS (OUTPATIENT)
Dept: ADMINISTRATIVE | Facility: HOSPITAL | Age: 73
End: 2023-05-16
Payer: MEDICARE

## 2023-05-16 DIAGNOSIS — Z78.0 POST-MENOPAUSAL: Primary | ICD-10-CM

## 2023-05-22 NOTE — SIGNIFICANT NOTE
I accompanied the patient to her appointment with Dr. Ruiz on 5/1/2023.    Dr. Ruiz discussed her imaging and pathology in detail and fred diagrams.    Dr. Ruiz discussed surgical options.    The patient stated that she would like to proceed with a lumpectomy.    The patient is scheduled to see Dr. Sellers on 5/12/2023.    The patient stated that she would like to schedule her radiation oncology consult after surgery.

## 2023-05-23 ENCOUNTER — HOSPITAL ENCOUNTER (OUTPATIENT)
Dept: BONE DENSITY | Facility: HOSPITAL | Age: 73
Discharge: HOME | End: 2023-05-23
Payer: MEDICARE

## 2023-05-23 DIAGNOSIS — Z78.0 POST-MENOPAUSAL: ICD-10-CM

## 2023-05-25 ENCOUNTER — HOSPITAL ENCOUNTER (OUTPATIENT)
Facility: HOSPITAL | Age: 73
Setting detail: HOSPITAL OUTPATIENT SURGERY
Discharge: HOME | End: 2023-05-25
Attending: SURGERY | Admitting: SURGERY
Payer: MEDICARE

## 2023-05-25 ENCOUNTER — ANESTHESIA EVENT (OUTPATIENT)
Dept: PERIOP | Facility: HOSPITAL | Age: 73
End: 2023-05-25
Payer: MEDICARE

## 2023-05-25 ENCOUNTER — HOSPITAL ENCOUNTER (OUTPATIENT)
Dept: MAMMOGRAPHY | Facility: HOSPITAL | Age: 73
Discharge: HOME | End: 2023-05-25
Payer: MEDICARE

## 2023-05-25 ENCOUNTER — ANESTHESIA (OUTPATIENT)
Dept: PERIOP | Facility: HOSPITAL | Age: 73
End: 2023-05-25
Payer: MEDICARE

## 2023-05-25 VITALS
BODY MASS INDEX: 26.2 KG/M2 | WEIGHT: 142.4 LBS | SYSTOLIC BLOOD PRESSURE: 144 MMHG | DIASTOLIC BLOOD PRESSURE: 57 MMHG | RESPIRATION RATE: 17 BRPM | OXYGEN SATURATION: 97 % | HEART RATE: 73 BPM | HEIGHT: 62 IN | TEMPERATURE: 96.8 F

## 2023-05-25 DIAGNOSIS — D05.11 BREAST NEOPLASM, TIS (DCIS), RIGHT: ICD-10-CM

## 2023-05-25 LAB
APTT PPP: 25.4 SECONDS (ref 24–31)
GLUCOSE BLDC GLUCOMTR-MCNC: 167 MG/DL (ref 70–105)
GLUCOSE BLDC GLUCOMTR-MCNC: 197 MG/DL (ref 70–105)
GLUCOSE BLDC GLUCOMTR-MCNC: 213 MG/DL (ref 70–105)
INR PPP: 0.97 (ref 0.93–1.1)
PROTHROMBIN TIME: 10.4 SECONDS (ref 9.6–11.7)

## 2023-05-25 PROCEDURE — C1819 TISSUE LOCALIZATION-EXCISION: HCPCS

## 2023-05-25 PROCEDURE — 93005 ELECTROCARDIOGRAM TRACING: CPT | Performed by: ANESTHESIOLOGY

## 2023-05-25 PROCEDURE — 93010 ELECTROCARDIOGRAM REPORT: CPT | Performed by: INTERNAL MEDICINE

## 2023-05-25 PROCEDURE — 76098 X-RAY EXAM SURGICAL SPECIMEN: CPT

## 2023-05-25 PROCEDURE — 25010000002 ONDANSETRON PER 1 MG: Performed by: NURSE ANESTHETIST, CERTIFIED REGISTERED

## 2023-05-25 PROCEDURE — 25010000002 FENTANYL CITRATE (PF) 100 MCG/2ML SOLUTION: Performed by: NURSE ANESTHETIST, CERTIFIED REGISTERED

## 2023-05-25 PROCEDURE — 25010000002 DEXAMETHASONE PER 1 MG: Performed by: NURSE ANESTHETIST, CERTIFIED REGISTERED

## 2023-05-25 PROCEDURE — 25010000002 PROPOFOL 200 MG/20ML EMULSION: Performed by: NURSE ANESTHETIST, CERTIFIED REGISTERED

## 2023-05-25 PROCEDURE — 63710000001 INSULIN LISPRO (HUMAN) PER 5 UNITS: Performed by: ANESTHESIOLOGY

## 2023-05-25 PROCEDURE — 85730 THROMBOPLASTIN TIME PARTIAL: CPT | Performed by: SURGERY

## 2023-05-25 PROCEDURE — 88307 TISSUE EXAM BY PATHOLOGIST: CPT | Performed by: SURGERY

## 2023-05-25 PROCEDURE — 82948 REAGENT STRIP/BLOOD GLUCOSE: CPT

## 2023-05-25 PROCEDURE — 85610 PROTHROMBIN TIME: CPT | Performed by: SURGERY

## 2023-05-25 PROCEDURE — 25010000002 CEFAZOLIN PER 500 MG: Performed by: SURGERY

## 2023-05-25 PROCEDURE — 19301 PARTIAL MASTECTOMY: CPT | Performed by: SURGERY

## 2023-05-25 RX ORDER — PROPOFOL 10 MG/ML
INJECTION, EMULSION INTRAVENOUS AS NEEDED
Status: DISCONTINUED | OUTPATIENT
Start: 2023-05-25 | End: 2023-05-25 | Stop reason: SURG

## 2023-05-25 RX ORDER — PROCHLORPERAZINE EDISYLATE 5 MG/ML
10 INJECTION INTRAMUSCULAR; INTRAVENOUS ONCE AS NEEDED
Status: DISCONTINUED | OUTPATIENT
Start: 2023-05-25 | End: 2023-05-25 | Stop reason: HOSPADM

## 2023-05-25 RX ORDER — PROMETHAZINE HYDROCHLORIDE 25 MG/1
25 TABLET ORAL ONCE AS NEEDED
Status: DISCONTINUED | OUTPATIENT
Start: 2023-05-25 | End: 2023-05-25 | Stop reason: HOSPADM

## 2023-05-25 RX ORDER — EPHEDRINE SULFATE 5 MG/ML
INJECTION INTRAVENOUS AS NEEDED
Status: DISCONTINUED | OUTPATIENT
Start: 2023-05-25 | End: 2023-05-25 | Stop reason: SURG

## 2023-05-25 RX ORDER — SODIUM CHLORIDE 0.9 % (FLUSH) 0.9 %
10 SYRINGE (ML) INJECTION AS NEEDED
Status: DISCONTINUED | OUTPATIENT
Start: 2023-05-25 | End: 2023-05-25 | Stop reason: HOSPADM

## 2023-05-25 RX ORDER — INSULIN LISPRO 100 [IU]/ML
5 INJECTION, SOLUTION INTRAVENOUS; SUBCUTANEOUS ONCE
Status: COMPLETED | OUTPATIENT
Start: 2023-05-25 | End: 2023-05-25

## 2023-05-25 RX ORDER — GLYCOPYRROLATE 0.2 MG/ML
INJECTION INTRAMUSCULAR; INTRAVENOUS AS NEEDED
Status: DISCONTINUED | OUTPATIENT
Start: 2023-05-25 | End: 2023-05-25 | Stop reason: SURG

## 2023-05-25 RX ORDER — DEXAMETHASONE SODIUM PHOSPHATE 4 MG/ML
INJECTION, SOLUTION INTRA-ARTICULAR; INTRALESIONAL; INTRAMUSCULAR; INTRAVENOUS; SOFT TISSUE AS NEEDED
Status: DISCONTINUED | OUTPATIENT
Start: 2023-05-25 | End: 2023-05-25 | Stop reason: SURG

## 2023-05-25 RX ORDER — IPRATROPIUM BROMIDE AND ALBUTEROL SULFATE 2.5; .5 MG/3ML; MG/3ML
3 SOLUTION RESPIRATORY (INHALATION) ONCE AS NEEDED
Status: DISCONTINUED | OUTPATIENT
Start: 2023-05-25 | End: 2023-05-25 | Stop reason: HOSPADM

## 2023-05-25 RX ORDER — SODIUM CHLORIDE, SODIUM LACTATE, POTASSIUM CHLORIDE, CALCIUM CHLORIDE 600; 310; 30; 20 MG/100ML; MG/100ML; MG/100ML; MG/100ML
1000 INJECTION, SOLUTION INTRAVENOUS CONTINUOUS
Status: DISCONTINUED | OUTPATIENT
Start: 2023-05-25 | End: 2023-05-25 | Stop reason: HOSPADM

## 2023-05-25 RX ORDER — CHLORHEXIDINE GLUCONATE 0.12 MG/ML
15 RINSE ORAL EVERY 12 HOURS SCHEDULED
Status: DISCONTINUED | OUTPATIENT
Start: 2023-05-25 | End: 2023-05-25 | Stop reason: HOSPADM

## 2023-05-25 RX ORDER — HYDROCODONE BITARTRATE AND ACETAMINOPHEN 5; 325 MG/1; MG/1
1 TABLET ORAL EVERY 4 HOURS PRN
Qty: 30 TABLET | Refills: 0 | Status: SHIPPED | OUTPATIENT
Start: 2023-05-25 | End: 2023-06-07

## 2023-05-25 RX ORDER — NALBUPHINE HYDROCHLORIDE 10 MG/ML
10 INJECTION, SOLUTION INTRAMUSCULAR; INTRAVENOUS; SUBCUTANEOUS EVERY 4 HOURS PRN
Status: DISCONTINUED | OUTPATIENT
Start: 2023-05-25 | End: 2023-05-25 | Stop reason: HOSPADM

## 2023-05-25 RX ORDER — ONDANSETRON 2 MG/ML
INJECTION INTRAMUSCULAR; INTRAVENOUS AS NEEDED
Status: DISCONTINUED | OUTPATIENT
Start: 2023-05-25 | End: 2023-05-25 | Stop reason: SURG

## 2023-05-25 RX ORDER — NALOXONE HCL 0.4 MG/ML
0.4 VIAL (ML) INJECTION AS NEEDED
Status: DISCONTINUED | OUTPATIENT
Start: 2023-05-25 | End: 2023-05-25 | Stop reason: HOSPADM

## 2023-05-25 RX ORDER — ONDANSETRON 2 MG/ML
4 INJECTION INTRAMUSCULAR; INTRAVENOUS ONCE AS NEEDED
Status: DISCONTINUED | OUTPATIENT
Start: 2023-05-25 | End: 2023-05-25 | Stop reason: HOSPADM

## 2023-05-25 RX ORDER — FENTANYL CITRATE 50 UG/ML
INJECTION, SOLUTION INTRAMUSCULAR; INTRAVENOUS AS NEEDED
Status: DISCONTINUED | OUTPATIENT
Start: 2023-05-25 | End: 2023-05-25 | Stop reason: SURG

## 2023-05-25 RX ORDER — FENTANYL CITRATE 50 UG/ML
50 INJECTION, SOLUTION INTRAMUSCULAR; INTRAVENOUS
Status: DISCONTINUED | OUTPATIENT
Start: 2023-05-25 | End: 2023-05-25 | Stop reason: HOSPADM

## 2023-05-25 RX ORDER — NALBUPHINE HYDROCHLORIDE 10 MG/ML
2 INJECTION, SOLUTION INTRAMUSCULAR; INTRAVENOUS; SUBCUTANEOUS EVERY 4 HOURS PRN
Status: DISCONTINUED | OUTPATIENT
Start: 2023-05-25 | End: 2023-05-25 | Stop reason: HOSPADM

## 2023-05-25 RX ORDER — INSULIN LISPRO 100 [IU]/ML
3 INJECTION, SOLUTION INTRAVENOUS; SUBCUTANEOUS ONCE
Status: COMPLETED | OUTPATIENT
Start: 2023-05-25 | End: 2023-05-25

## 2023-05-25 RX ORDER — EPHEDRINE SULFATE 5 MG/ML
5 INJECTION INTRAVENOUS ONCE AS NEEDED
Status: DISCONTINUED | OUTPATIENT
Start: 2023-05-25 | End: 2023-05-25 | Stop reason: HOSPADM

## 2023-05-25 RX ORDER — CHLORHEXIDINE GLUCONATE 0.12 MG/ML
15 RINSE ORAL EVERY 12 HOURS SCHEDULED
Status: DISCONTINUED | OUTPATIENT
Start: 2023-05-25 | End: 2023-05-25 | Stop reason: SDUPTHER

## 2023-05-25 RX ORDER — LIDOCAINE HYDROCHLORIDE 10 MG/ML
0.5 INJECTION, SOLUTION INFILTRATION; PERINEURAL ONCE AS NEEDED
Status: DISCONTINUED | OUTPATIENT
Start: 2023-05-25 | End: 2023-05-25 | Stop reason: HOSPADM

## 2023-05-25 RX ORDER — BUPIVACAINE HYDROCHLORIDE 2.5 MG/ML
INJECTION, SOLUTION INFILTRATION; PERINEURAL AS NEEDED
Status: DISCONTINUED | OUTPATIENT
Start: 2023-05-25 | End: 2023-05-25 | Stop reason: HOSPADM

## 2023-05-25 RX ORDER — PROMETHAZINE HYDROCHLORIDE 25 MG/1
25 SUPPOSITORY RECTAL ONCE AS NEEDED
Status: DISCONTINUED | OUTPATIENT
Start: 2023-05-25 | End: 2023-05-25 | Stop reason: HOSPADM

## 2023-05-25 RX ORDER — PHENYLEPHRINE HCL IN 0.9% NACL 1 MG/10 ML
SYRINGE (ML) INTRAVENOUS AS NEEDED
Status: DISCONTINUED | OUTPATIENT
Start: 2023-05-25 | End: 2023-05-25 | Stop reason: SURG

## 2023-05-25 RX ORDER — LIDOCAINE HYDROCHLORIDE 20 MG/ML
INJECTION, SOLUTION EPIDURAL; INFILTRATION; INTRACAUDAL; PERINEURAL AS NEEDED
Status: DISCONTINUED | OUTPATIENT
Start: 2023-05-25 | End: 2023-05-25 | Stop reason: SURG

## 2023-05-25 RX ORDER — DIPHENHYDRAMINE HYDROCHLORIDE 50 MG/ML
12.5 INJECTION INTRAMUSCULAR; INTRAVENOUS
Status: DISCONTINUED | OUTPATIENT
Start: 2023-05-25 | End: 2023-05-25 | Stop reason: HOSPADM

## 2023-05-25 RX ADMIN — INSULIN LISPRO 5 UNITS: 100 INJECTION, SOLUTION INTRAVENOUS; SUBCUTANEOUS at 09:42

## 2023-05-25 RX ADMIN — FENTANYL CITRATE 50 MCG: 50 INJECTION, SOLUTION INTRAMUSCULAR; INTRAVENOUS at 10:20

## 2023-05-25 RX ADMIN — MUPIROCIN 1 APPLICATION: 20 OINTMENT TOPICAL at 07:32

## 2023-05-25 RX ADMIN — LIDOCAINE HYDROCHLORIDE 100 MG: 20 INJECTION, SOLUTION EPIDURAL; INFILTRATION; INTRACAUDAL; PERINEURAL at 10:19

## 2023-05-25 RX ADMIN — ONDANSETRON 4 MG: 2 INJECTION INTRAMUSCULAR; INTRAVENOUS at 10:50

## 2023-05-25 RX ADMIN — PROPOFOL 120 MG: 10 INJECTION, EMULSION INTRAVENOUS at 10:19

## 2023-05-25 RX ADMIN — INSULIN LISPRO 3 UNITS: 100 INJECTION, SOLUTION INTRAVENOUS; SUBCUTANEOUS at 07:52

## 2023-05-25 RX ADMIN — PROPOFOL 50 MG: 10 INJECTION, EMULSION INTRAVENOUS at 10:20

## 2023-05-25 RX ADMIN — FENTANYL CITRATE 50 MCG: 50 INJECTION, SOLUTION INTRAMUSCULAR; INTRAVENOUS at 10:19

## 2023-05-25 RX ADMIN — CEFAZOLIN 2 G: 2 INJECTION, POWDER, FOR SOLUTION INTRAMUSCULAR; INTRAVENOUS at 10:23

## 2023-05-25 RX ADMIN — EPHEDRINE SULFATE 15 MG: 5 INJECTION INTRAVENOUS at 10:35

## 2023-05-25 RX ADMIN — DEXAMETHASONE SODIUM PHOSPHATE 4 MG: 4 INJECTION, SOLUTION INTRAMUSCULAR; INTRAVENOUS at 10:40

## 2023-05-25 RX ADMIN — EPHEDRINE SULFATE 15 MG: 5 INJECTION INTRAVENOUS at 10:22

## 2023-05-25 RX ADMIN — SODIUM CHLORIDE, POTASSIUM CHLORIDE, SODIUM LACTATE AND CALCIUM CHLORIDE 1000 ML: 600; 310; 30; 20 INJECTION, SOLUTION INTRAVENOUS at 07:07

## 2023-05-25 RX ADMIN — EPHEDRINE SULFATE 10 MG: 5 INJECTION INTRAVENOUS at 10:26

## 2023-05-25 RX ADMIN — EPHEDRINE SULFATE 10 MG: 5 INJECTION INTRAVENOUS at 10:47

## 2023-05-25 RX ADMIN — Medication 100 MCG: at 10:48

## 2023-05-25 RX ADMIN — GLYCOPYRROLATE 0.2 MG: 0.2 INJECTION INTRAMUSCULAR; INTRAVENOUS at 10:50

## 2023-05-25 RX ADMIN — CHLORHEXIDINE GLUCONATE 15 ML: 1.2 SOLUTION ORAL at 09:42

## 2023-05-25 NOTE — DISCHARGE INSTRUCTIONS
Ok for discharge  Follow-up with me (Dr. Ruiz) in 2 weeks  Regular diet as tolerated  Ok to shower starting tomorrow. No tub baths, pools, lakes, or streams for 1 week.  Ok to apply ice to incisions  No heavy lifting (greater than 20 lbs) for 6 weeks after surgery  Call my office or present to the ED with: fevers greater than 101.5, redness around the incisions, drainage from the incisions, intractable nausea/vomiting, or pain that is getting worse instead of better

## 2023-05-25 NOTE — ANESTHESIA PREPROCEDURE EVALUATION
Anesthesia Evaluation     Patient summary reviewed and Nursing notes reviewed   NPO Solid Status: > 8 hours  NPO Liquid Status: > 8 hours           Airway   Mallampati: II  TM distance: >3 FB  Neck ROM: full  No difficulty expected  Dental - normal exam     Pulmonary    (+) sleep apnea,   Cardiovascular     (+) hypertension, hyperlipidemia,       Neuro/Psych  (+) numbness,    GI/Hepatic/Renal/Endo    (+)   diabetes mellitus type 2, thyroid problem     Musculoskeletal     Abdominal    Substance History      OB/GYN          Other      history of cancer                    Anesthesia Plan    ASA 3     general     intravenous induction     Anesthetic plan, risks, benefits, and alternatives have been provided, discussed and informed consent has been obtained with: patient.    Plan discussed with CRNA.        CODE STATUS:

## 2023-05-25 NOTE — ANESTHESIA POSTPROCEDURE EVALUATION
Patient: Gi Velez    Procedure Summary     Date: 05/25/23 Room / Location: [facility] OR 09 / [facility] MAIN OR    Anesthesia Start: 1013 Anesthesia Stop: 1116    Procedure: WIRE LOCALIZED RIGHT LUMPECTOMY (Right: Breast) Diagnosis:       Breast neoplasm, Tis (DCIS), right      (Breast neoplasm, Tis (DCIS), right [D05.11])    Surgeons: Joe Ruiz MD Provider: Paulino Hanks MD    Anesthesia Type: general ASA Status: 3          Anesthesia Type: general    Vitals  Vitals Value Taken Time   /66 05/25/23 1202   Temp 97.5 °F (36.4 °C) 05/25/23 1200   Pulse 100 05/25/23 1206   Resp 12 05/25/23 1200   SpO2 94 % 05/25/23 1206   Vitals shown include unvalidated device data.        Post Anesthesia Care and Evaluation    Patient location during evaluation: PACU  Patient participation: complete - patient participated  Level of consciousness: awake  Pain scale: See nurse's notes for pain score.  Pain management: adequate    Airway patency: patent  Anesthetic complications: No anesthetic complications  PONV Status: none  Cardiovascular status: acceptable  Respiratory status: acceptable and spontaneous ventilation  Hydration status: acceptable    Comments: Patient seen and examined postoperatively; vital signs stable; SpO2 greater than or equal to 90%; cardiopulmonary status stable; nausea/vomiting adequately controlled; pain adequately controlled; no apparent anesthesia complications; patient discharged from anesthesia care when discharge criteria were met

## 2023-05-25 NOTE — OP NOTE
BREAST LUMPECTOMY WITH NEEDLE LOCALIZATION  Operative Note    Patient Name:  Gi Velez  YOB: 1950    Date of Surgery:  5/25/2023     Indications:  Patient is a 73 year old lady with a new diagnosis of right breast DCIS.  We discussed the risk, benefits, and alternatives to surgery, patient understands and agrees to proceed to the operating room for wire localized right breast lumpectomy.    Pre-op Diagnosis:   Breast neoplasm, Tis (DCIS), right [D05.11]    Post-op Diagnosis:  Post-Op Diagnosis Codes:     * Breast neoplasm, Tis (DCIS), right [D05.11]    Procedure/CPT® Codes:      Procedure(s):  WIRE LOCALIZED RIGHT LUMPECTOMY    Staff:  Surgeon(s):  Joe Ruiz MD    Assistant: Jamir Bowen CSA was responsible for performing the following activities: Retraction, Suction and Closing and their skilled assistance was necessary for the success of this case.     Anesthesia: General    Estimated Blood Loss: minimal    Implants:    Nothing was implanted during the procedure    Specimen:          Specimens     ID Source Type Tests Collected By Collected At Frozen?    A Breast, Right Tissue · TISSUE PATHOLOGY EXAM   Joe Ruiz MD 5/25/23 1047 No    Description: RIGHT LUMPECTOMY; SHORT STITCH SUPERIOR, LONG STITCH LATERAL; DOUBLE STITCH DEEP          Findings: Wire, clip, and area of concern removed from the medial aspect of the right breast    Complications: None, immediately    Description of Procedure: After obtaining informed consent the preop holding area, the patient was taken to [facility] imaging where she underwent wire localization of the area of concern.  She was then brought to the operating room placed in the supine position.  SCDs were applied, preoperative antibiotics were administered.  The patient then underwent uncomplicated duction of general endotracheal anesthesia.  The right chest and axilla were then prepped and draped in the usual sterile fashion, and after a  brief timeout the procedure began.  I first began by reviewing the patient's imaging, then made a transverse incision slightly inferior to the entry point of the needle on the medial aspect of the right breast.  I then dissected superiorly until the wire could be seen entering the incision through the subcutaneous fat.  The wire was then brought through the incision I then grasped the subcutaneous fat and breast tissue with an Allis clamp and used electrocautery to remove a lump of tissue completely surrounding the wire.  With the wire and the lump now completely removed, this was marked with a short stitch marking the superior margin, long stitch marking the lateral margin, and a double stitch marking the deep margin.  This was then passed off the field for mammographic review where it was confirmed that the wire, clip, and area of concern were within the specimen.  I then reexamined the wound, there was no evidence of any abnormal appearing tissue.  There is no evidence of any bleeding.  I then infiltrated local anesthesia into the wound and closed the wound using interrupted 3-0 Vicryl to reapproximate the deep subcutaneous fat layer, 3-0 Vicryl to reapproximate the deep dermis, and running 4-0 Monocryl in a subcuticular fashion to reapproximate the skin.  The wound was dressed with skin glue, fluffs, ABD, and a procedure.  The patient tolerated procedure well, was awoken, and taken to PACU in satisfactory condition.    Joe Ruiz MD     Date: 5/25/2023  Time: 11:18 EDT

## 2023-05-25 NOTE — ANESTHESIA PROCEDURE NOTES
Airway  Urgency: elective    Date/Time: 5/25/2023 10:20 AM  Airway not difficult    General Information and Staff    Patient location during procedure: OR  Anesthesiologist: Paulino Hanks MD  CRNA/CAA: Salina Umana CRNA    Indications and Patient Condition  Indications for airway management: airway protection    Preoxygenated: yes  MILS maintained throughout  Mask difficulty assessment: 0 - not attempted    Final Airway Details  Final airway type: supraglottic airway      Successful airway: unique and LMA  Size 4     Number of attempts at approach: 1  Assessment: lips, teeth, and gum same as pre-op and atraumatic intubation

## 2023-05-26 LAB
LAB AP CASE REPORT: NORMAL
LAB AP SYNOPTIC CHECKLIST: NORMAL
PATH REPORT.FINAL DX SPEC: NORMAL
PATH REPORT.GROSS SPEC: NORMAL

## 2023-05-27 LAB — QT INTERVAL: 363 MS

## 2023-06-07 ENCOUNTER — OFFICE VISIT (OUTPATIENT)
Dept: SURGERY | Facility: CLINIC | Age: 73
End: 2023-06-07
Payer: MEDICARE

## 2023-06-07 VITALS
HEART RATE: 69 BPM | WEIGHT: 142.8 LBS | DIASTOLIC BLOOD PRESSURE: 62 MMHG | HEIGHT: 62 IN | OXYGEN SATURATION: 99 % | RESPIRATION RATE: 18 BRPM | BODY MASS INDEX: 26.28 KG/M2 | TEMPERATURE: 98.2 F | SYSTOLIC BLOOD PRESSURE: 154 MMHG

## 2023-06-07 DIAGNOSIS — D05.11 BREAST NEOPLASM, TIS (DCIS), RIGHT: Primary | ICD-10-CM

## 2023-06-07 PROCEDURE — 1160F RVW MEDS BY RX/DR IN RCRD: CPT | Performed by: SURGERY

## 2023-06-07 PROCEDURE — 99024 POSTOP FOLLOW-UP VISIT: CPT | Performed by: SURGERY

## 2023-06-07 PROCEDURE — 1159F MED LIST DOCD IN RCRD: CPT | Performed by: SURGERY

## 2023-06-07 NOTE — PROGRESS NOTES
"Post-op Note    Subjective   Gi Velez is a 73 y.o. female status post wire localized right breast lumpectomy for DCIS on 5/25/2023.  Overall, the patient is doing well.  She is not having significant pain.  She is pleased with her cosmetic outcome.      Objective   /62 (BP Location: Left arm, Patient Position: Sitting, Cuff Size: Adult)   Pulse 69   Temp 98.2 °F (36.8 °C) (Infrared)   Resp 18   Ht 157.5 cm (62\")   Wt 64.8 kg (142 lb 12.8 oz)   SpO2 99%   BMI 26.12 kg/m²   Surgical incision appears to be well-healed without any surrounding erythema, ration, or drainage to suggest infection.      Assessment & Plan   Patient is a 73-year-old lady status post wire localized right breast lumpectomy for DCIS on 5/25/2023.    Pathology reviewed with patient which demonstrates:  Breast, right, needle localization at excision:    Residual ductal carcinoma in situ, high nuclear grade, completely excised    No invasive carcinoma is identified    See synoptic report for additional details  Follow-up with Dr. Cabral as directed  We will make a referral to radiation oncology to discuss the possibility of performing radiation therapy given the high-grade DCIS seen on biopsy.  Margins were negative by 3 mm  Recommend follow-up with me in 6 months or sooner if issues arise  May wear procedure as desired    Joe Ruiz MD  6/7/2023  17:12 EDT  "

## 2023-07-03 ENCOUNTER — HOSPITAL ENCOUNTER (OUTPATIENT)
Dept: RADIATION ONCOLOGY | Facility: HOSPITAL | Age: 73
Setting detail: RADIATION/ONCOLOGY SERIES
End: 2023-07-03
Payer: MEDICARE

## 2023-07-03 PROCEDURE — 77280 THER RAD SIMULAJ FIELD SMPL: CPT | Performed by: RADIOLOGY

## 2023-07-03 PROCEDURE — 77412 RADIATION TX DELIVERY LVL 3: CPT | Performed by: RADIOLOGY

## 2023-07-03 PROCEDURE — 77427 RADIATION TX MANAGEMENT X5: CPT | Performed by: RADIOLOGY

## 2023-07-05 PROCEDURE — 77412 RADIATION TX DELIVERY LVL 3: CPT | Performed by: RADIOLOGY

## 2023-07-05 PROCEDURE — 77417 THER RADIOLOGY PORT IMAGE(S): CPT | Performed by: RADIOLOGY

## 2023-07-06 PROCEDURE — 77336 RADIATION PHYSICS CONSULT: CPT | Performed by: RADIOLOGY

## 2023-07-06 PROCEDURE — 77412 RADIATION TX DELIVERY LVL 3: CPT | Performed by: RADIOLOGY

## 2023-07-07 PROCEDURE — 77412 RADIATION TX DELIVERY LVL 3: CPT | Performed by: RADIOLOGY

## 2023-07-10 PROCEDURE — 77412 RADIATION TX DELIVERY LVL 3: CPT | Performed by: RADIOLOGY

## 2023-07-11 PROCEDURE — 77412 RADIATION TX DELIVERY LVL 3: CPT | Performed by: RADIOLOGY

## 2023-07-11 PROCEDURE — 77417 THER RADIOLOGY PORT IMAGE(S): CPT | Performed by: RADIOLOGY

## 2023-07-12 PROCEDURE — 77412 RADIATION TX DELIVERY LVL 3: CPT | Performed by: STUDENT IN AN ORGANIZED HEALTH CARE EDUCATION/TRAINING PROGRAM

## 2023-07-13 PROCEDURE — 77412 RADIATION TX DELIVERY LVL 3: CPT | Performed by: RADIOLOGY

## 2023-07-13 PROCEDURE — 77336 RADIATION PHYSICS CONSULT: CPT | Performed by: RADIOLOGY

## 2023-07-14 PROBLEM — I10 PRIMARY HYPERTENSION: Status: ACTIVE | Noted: 2023-07-14

## 2023-07-14 PROBLEM — R00.0 TACHYCARDIA: Status: ACTIVE | Noted: 2023-07-14

## 2023-07-14 PROCEDURE — 77412 RADIATION TX DELIVERY LVL 3: CPT | Performed by: RADIOLOGY

## 2023-07-17 PROCEDURE — 77412 RADIATION TX DELIVERY LVL 3: CPT | Performed by: RADIOLOGY

## 2023-07-18 PROCEDURE — 77427 RADIATION TX MANAGEMENT X5: CPT | Performed by: RADIOLOGY

## 2023-07-18 PROCEDURE — 77412 RADIATION TX DELIVERY LVL 3: CPT | Performed by: RADIOLOGY

## 2023-07-18 PROCEDURE — 77417 THER RADIOLOGY PORT IMAGE(S): CPT | Performed by: RADIOLOGY

## 2023-07-19 PROCEDURE — 77412 RADIATION TX DELIVERY LVL 3: CPT | Performed by: RADIOLOGY

## 2023-07-20 PROCEDURE — 77336 RADIATION PHYSICS CONSULT: CPT | Performed by: RADIOLOGY

## 2023-07-20 PROCEDURE — 77412 RADIATION TX DELIVERY LVL 3: CPT | Performed by: RADIOLOGY

## 2023-07-21 PROCEDURE — 77412 RADIATION TX DELIVERY LVL 3: CPT | Performed by: RADIOLOGY

## 2023-07-24 ENCOUNTER — HOSPITAL ENCOUNTER (OUTPATIENT)
Dept: RADIATION ONCOLOGY | Facility: HOSPITAL | Age: 73
Discharge: HOME | End: 2023-07-24
Payer: MEDICARE

## 2023-07-24 ENCOUNTER — RADIATION ONCOLOGY WEEKLY ASSESSMENT (OUTPATIENT)
Dept: RADIATION ONCOLOGY | Facility: HOSPITAL | Age: 73
End: 2023-07-24
Payer: MEDICARE

## 2023-07-24 VITALS
BODY MASS INDEX: 26.12 KG/M2 | HEART RATE: 83 BPM | OXYGEN SATURATION: 97 % | RESPIRATION RATE: 18 BRPM | WEIGHT: 142.8 LBS | SYSTOLIC BLOOD PRESSURE: 145 MMHG | DIASTOLIC BLOOD PRESSURE: 72 MMHG

## 2023-07-24 DIAGNOSIS — Z79.4 TYPE 2 DIABETES MELLITUS WITH DIABETIC POLYNEUROPATHY, WITH LONG-TERM CURRENT USE OF INSULIN: ICD-10-CM

## 2023-07-24 DIAGNOSIS — E11.42 TYPE 2 DIABETES MELLITUS WITH DIABETIC POLYNEUROPATHY, WITH LONG-TERM CURRENT USE OF INSULIN: ICD-10-CM

## 2023-07-24 DIAGNOSIS — E11.42 DIABETIC PERIPHERAL NEUROPATHY: ICD-10-CM

## 2023-07-24 DIAGNOSIS — D05.11 BREAST NEOPLASM, TIS (DCIS), RIGHT: Primary | ICD-10-CM

## 2023-07-24 LAB
RAD ONC ARIA COURSE ID: NORMAL
RAD ONC ARIA COURSE LAST TREATMENT DATE: NORMAL
RAD ONC ARIA COURSE START DATE: NORMAL
RAD ONC ARIA COURSE TREATMENT ELAPSED DAYS: 21
RAD ONC ARIA FIRST TREATMENT DATE: NORMAL
RAD ONC ARIA PLAN FRACTIONS TREATED TO DATE: 15
RAD ONC ARIA PLAN ID: NORMAL
RAD ONC ARIA PLAN PRESCRIBED DOSE PER FRACTION: 2.66 GY
RAD ONC ARIA PLAN PRIMARY REFERENCE POINT: NORMAL
RAD ONC ARIA PLAN TOTAL FRACTIONS PRESCRIBED: 16
RAD ONC ARIA PLAN TOTAL PRESCRIBED DOSE: 4256 CGY
RAD ONC ARIA REFERENCE POINT DOSAGE GIVEN TO DATE: 39.9 GY
RAD ONC ARIA REFERENCE POINT ID: NORMAL
RAD ONC ARIA REFERENCE POINT SESSION DOSAGE GIVEN: 2.66 GY

## 2023-07-24 PROCEDURE — FACE2FACE: Performed by: RADIOLOGY

## 2023-07-24 PROCEDURE — 77412 RADIATION TX DELIVERY LVL 3: CPT | Performed by: RADIOLOGY

## 2023-07-24 RX ORDER — MOMETASONE FUROATE 1 MG/G
CREAM TOPICAL
Qty: 45 G | Refills: 0 | Status: SHIPPED | OUTPATIENT
Start: 2023-07-24

## 2023-07-24 NOTE — PATIENT INSTRUCTIONS
RADIATION THERAPY DISCHARGE INSTRUCTIONS  Breast    CONGRATULATIONS! You completed 16 radiation treatments for treatment of your right breast cancer. These discharge instructions are important for you to follow until your one-month follow up appointment with Dr. Sow. Please make sure to review these instructions and call the Radiation Oncology Department if you have any questions or concerns with symptoms you may experience. Thank you for trusting us with your cancer treatment!    DIET  Eat a regular, well balanced diet that is high in protein such as meat, eggs, cheese, and nut butters  Drink 48 to 64 ounces of fluid daily    MEDICATIONS  Use Tylenol as needed to decrease breast discomfort and irritation due to swelling and skin reaction    SKIN CARE  Wash treated skin gently with your hands using a mild, non-drying soap such as Dove® or Aveeno® until skin returns to normal  Pat skin dry - do not rub  Keep treated skin moist with frequent applications of Aquaphor® or unscented lotion (such as Eucerin)®  Always protect your treated skin when outdoors by wearing protective clothing and applying sunscreen SPF 15 or higher at least 30 minutes before going outdoors and reapply frequently  Resume use of deodorant to the armpit of the affected arm when skin reactions improve     ACTIVITY  Fatigue is a normal side effect of radiation therapy and should improve over time  Alternate rest and activity  Exercise such as walking may help to improve your fatigue    FOLLOW-UP  Continue follow-up appointments with all other doctors as necessary  Continue to have regular mammograms as ordered by your physician  Call your radiation oncology doctor if you are concerned with any side effects you are experiencing: [phone]    SPECIAL INSTRUCTIONS  Perform self-breast exams monthly  (if applicable) Continue all range of motion and mobility exercise as instructed   (if applicable) Never allow blood draws or blood pressures to be  taken on your affected arm unless in an emergency situation   Practice careful nail care and avoid open skin to your affected arm and hand  Continue performing the on-treatment skin care routine recommended by your radiation oncology until your 1-month follow-up appointment  Call your physician if you notice swelling of your affected arm or hand that is unusual or doesn't go away    _______________________________________________________________________    Completed by: Evelyn Rehman RN on 7/24/2023 at 09:02 EDT

## 2023-07-24 NOTE — PROGRESS NOTES
RADIATION THERAPY COMPLETION and DISCHARGE     Gi Velez completed radiation therapy on 07/25/2023 for right breast cancer. The summary of her treatment is as follows:    TREATMENT SITE:   [facility] START DATE:   07/03/2023   TOTAL DOSE (cGy):   4256 END DATE:   07/25/2023   DOSE/FRACTION:   266 ELAPSED DAYS:   21   TOTAL FACTIONS:   16 PHYSICIAN:    Joe Sow MD     She is scheduled for a one-month follow-up appointment with Dr. Sow on 8/29/2023 at 9:30AM.     The following instructions were provided to the patient and/or family in their printed after visit summary (AVS) as well as discussed in-person with the radiation oncology nurse. The patient and/or family member had the opportunity to ask questions and verbalized their questions were adequately answered.   _______________________________________________________________________      RADIATION THERAPY DISCHARGE INSTRUCTIONS  Breast    CONGRATULATIONS! You completed 16 radiation treatments for treatment of your right breast cancer. These discharge instructions are important for you to follow until your one-month follow up appointment with Dr. Sow. Please make sure to review these instructions and call the Radiation Oncology Department if you have any questions or concerns with symptoms you may experience. Thank you for trusting us with your cancer treatment!    DIET  Eat a regular, well balanced diet that is high in protein such as meat, eggs, cheese, and nut butters  Drink 48 to 64 ounces of fluid daily    MEDICATIONS  Use Tylenol as needed to decrease breast discomfort and irritation due to swelling and skin reaction    SKIN CARE  Wash treated skin gently with your hands using a mild, non-drying soap such as Dove® or Aveeno® until skin returns to normal  Pat skin dry - do not rub  Keep treated skin moist with frequent applications of Aquaphor® or unscented lotion (such as Eucerin)®  Always protect your treated skin when outdoors by wearing protective  clothing and applying sunscreen SPF 15 or higher at least 30 minutes before going outdoors and reapply frequently  Resume use of deodorant to the armpit of the affected arm when skin reactions improve     ACTIVITY  Fatigue is a normal side effect of radiation therapy and should improve over time  Alternate rest and activity  Exercise such as walking may help to improve your fatigue    FOLLOW-UP  Continue follow-up appointments with all other doctors as necessary  Continue to have regular mammograms as ordered by your physician  Call your radiation oncology doctor if you are concerned with any side effects you are experiencing: [phone]    SPECIAL INSTRUCTIONS  Perform self-breast exams monthly  (if applicable) Continue all range of motion and mobility exercise as instructed   (if applicable) Never allow blood draws or blood pressures to be taken on your affected arm unless in an emergency situation   Practice careful nail care and avoid open skin to your affected arm and hand  Continue performing the on-treatment skin care routine recommended by your radiation oncology until your 1-month follow-up appointment  Call your physician if you notice swelling of your affected arm or hand that is unusual or doesn't go away    _______________________________________________________________________    Completed by: Evelyn Rehman RN, Radiation Oncology Nurse on 07/25/2023  at 09:00 EDT

## 2023-07-24 NOTE — PROGRESS NOTES
NAME: Gi Velez DATE: 2023   : 1950    MRN: [id number] DIAGNOSIS:   Encounter Diagnosis   Name Primary?    Breast neoplasm, Tis (DCIS), right Yes             RADIATION ON TREATMENT VISIT NOTE - BREAST    Treatment Summary  Treatment Site Ref. ID Energy Dose/Fx (cGy) #Fx Dose Correction (cGy) Total Dose (cGy) Start Date End Date Elapsed Days   RtBreast RtBreast 6X 266 15 / 16 0 3,990 7/3/2023 2023 21       General:     Review of Systems  [] No new complaints   [x] Skin itching   [] Skin breakdown  [] Breast swelling   [] Dysphagia   [] Dry cough  [] Productive cough   [] Fever/chills  [] Skin soreness, severity:  [] Fatigue, severity:   [] Breast pain, severity:      Skin regimen:  GTS + Eucerin BID    [x] Breast skin care teaching was completed on first day of radiation treatments.    Subjective:  Dermitis in V-neck area. Offered mometasone.    KPS: 90     Vital Signs: /72   Pulse 83   Resp 18   Wt 64.8 kg (142 lb 12.8 oz)   SpO2 97%   BMI 26.12 kg/m²     Weight:   Wt Readings from Last 3 Encounters:   23 64.8 kg (142 lb 12.8 oz)   23 64.6 kg (142 lb 6.4 oz)   23 65.3 kg (144 lb)     Medication:   Current Outpatient Medications:     atorvastatin (LIPITOR) 40 MG tablet, TAKE 1 TABLET BY MOUTH EVERY DAY (Patient taking differently: Take 1 tablet by mouth Every Night.), Disp: 90 tablet, Rfl: 3    benazepril (LOTENSIN) 5 MG tablet, Take 1 tablet by mouth Daily., Disp: 90 tablet, Rfl: 3    BIOTIN PO, Take 1 dose by mouth Every Evening. Ld , Disp: , Rfl:     Cholecalciferol 1000 units capsule, Take 1 capsule by mouth Every Evening. Ld , Disp: , Rfl:     CINNAMON PO, Take 1,500 mg by mouth Every Night. Ld , Disp: , Rfl:     clotrimazole-betamethasone (LOTRISONE) 1-0.05 % cream, Apply 1 application  topically to the appropriate area as directed As Needed., Disp: , Rfl:     Cyanocobalamin (B-12 PO), Take 1,000 mcg by mouth Every Evening. Ld , Disp: , Rfl:      diphenhydrAMINE (BENADRYL) 25 mg capsule, Take 1 capsule by mouth As Needed for Itching. None preop, Disp: , Rfl:     docusate sodium (COLACE) 100 MG capsule, Take 1 capsule by mouth Daily As Needed. None dos, Disp: , Rfl:     doxylamine (UNISOM) 25 MG tablet, Take 1 tablet by mouth Every Night., Disp: , Rfl:     glimepiride (AMARYL) 4 MG tablet, Take 2 tab ac breakfast. (Patient taking differently: Take 2 tablets by mouth Every Morning Before Breakfast. None am of surgery), Disp: 180 tablet, Rfl: 3    Insulin Pen Needle (NovoFine Plus) 32G X 4 MM misc, Inject 1 Piece under the skin into the appropriate area as directed Every Night., Disp: 100 each, Rfl: 3    metFORMIN ER (GLUCOPHAGE-XR) 500 MG 24 hr tablet, Take 2  tabs ac supper (Patient taking differently: Take 2 tablets by mouth Daily With Dinner. Take 2  tabs ac supper last dose 5/22), Disp: 180 tablet, Rfl: 3    Omega-3 Fatty Acids (FISH OIL PO), Take 1,200 mg by mouth Every Evening. Ld 5/18, Disp: , Rfl:     ONE TOUCH ULTRA TEST test strip, 1 each by Other route Daily., Disp: , Rfl: 5    pregabalin (LYRICA) 150 MG capsule, TAKE 1 CAPSULE BY MOUTH TWICE A DAY (Patient taking differently: Take 1 capsule by mouth 2 (Two) Times a Day. Take preop), Disp: 180 capsule, Rfl: 0    Tiadylt  MG 24 hr capsule, TAKE 1 CAPSULE BY MOUTH EVERY DAY (Patient taking differently: Take 1 capsule by mouth Every Night.), Disp: 90 capsule, Rfl: 3    TRESIBA FLEXTOUCH 200 UNIT/ML solution pen-injector, Inject 22 Units under the skin into the appropriate area as directed Daily. None preop, Disp: , Rfl: 5     LABS (Reviewed):  Hematology  WBC   Date Value Ref Range Status   05/15/2023 7.30 3.40 - 10.80 10*3/mm3 Final     RBC   Date Value Ref Range Status   05/15/2023 5.33 (H) 3.77 - 5.28 10*6/mm3 Final     Hemoglobin   Date Value Ref Range Status   05/15/2023 14.0 12.0 - 15.9 g/dL Final     Hematocrit   Date Value Ref Range Status   05/15/2023 42.9 34.0 - 46.6 % Final      Platelets   Date Value Ref Range Status   05/15/2023 126 (L) 140 - 450 10*3/mm3 Final     Chemistry   Lab Results   Component Value Date    GLUCOSE 103 (H) 05/15/2023    BUN 21 05/15/2023    CREATININE 0.96 05/15/2023    EGFRIFNONA 56 (L) 02/07/2022    BCR 21.9 05/15/2023    K 4.3 05/15/2023    CO2 23.1 05/15/2023    CALCIUM 9.9 05/15/2023    ALBUMIN 4.4 05/15/2023    LABIL2 1.4 07/05/2018    AST 16 05/15/2023    ALT 21 05/15/2023     Physical Exam:     Neck: [] Lymphadenopathy  Lungs: [x] Clear to auscultation  CVS: [x] Regular rate & rhythm  Skin: [x] ndGndrndanddndend:nd nd2nd Comments/Notes:     [x] Review of labs, images, dosimetry, dose delivered, & treatment parameters.    Comments:     [x] Patient treatment setup reviewed.    Comments:     Recommendations: Rx for mometasone    [x] Continue RT  [] Change RT Plan [] Hold RT, length:        Approved Electronically By:  Joe Sow MD, 7/24/2023, 09:17 EDT      Confidentiality of this medical record shall be maintained except when use or disclosure is required or permitted by law, regulation or written authorization by the patient.

## 2023-07-25 ENCOUNTER — RADIATION ONCOLOGY WEEKLY ASSESSMENT (OUTPATIENT)
Dept: RADIATION ONCOLOGY | Facility: HOSPITAL | Age: 73
End: 2023-07-25
Payer: MEDICARE

## 2023-07-25 ENCOUNTER — HOSPITAL ENCOUNTER (OUTPATIENT)
Dept: RADIATION ONCOLOGY | Facility: HOSPITAL | Age: 73
Discharge: HOME | End: 2023-07-25
Payer: MEDICARE

## 2023-07-25 DIAGNOSIS — D05.11 BREAST NEOPLASM, TIS (DCIS), RIGHT: Primary | ICD-10-CM

## 2023-07-25 LAB
RAD ONC ARIA COURSE ID: NORMAL
RAD ONC ARIA COURSE LAST TREATMENT DATE: NORMAL
RAD ONC ARIA COURSE START DATE: NORMAL
RAD ONC ARIA COURSE TREATMENT ELAPSED DAYS: 22
RAD ONC ARIA FIRST TREATMENT DATE: NORMAL
RAD ONC ARIA PLAN FRACTIONS TREATED TO DATE: 16
RAD ONC ARIA PLAN ID: NORMAL
RAD ONC ARIA PLAN PRESCRIBED DOSE PER FRACTION: 2.66 GY
RAD ONC ARIA PLAN PRIMARY REFERENCE POINT: NORMAL
RAD ONC ARIA PLAN TOTAL FRACTIONS PRESCRIBED: 16
RAD ONC ARIA PLAN TOTAL PRESCRIBED DOSE: 4256 CGY
RAD ONC ARIA REFERENCE POINT DOSAGE GIVEN TO DATE: 42.56 GY
RAD ONC ARIA REFERENCE POINT ID: NORMAL
RAD ONC ARIA REFERENCE POINT SESSION DOSAGE GIVEN: 2.66 GY

## 2023-07-25 PROCEDURE — 77412 RADIATION TX DELIVERY LVL 3: CPT | Performed by: RADIOLOGY

## 2023-07-25 PROCEDURE — FACE2FACE: Performed by: RADIOLOGY

## 2023-07-25 RX ORDER — METFORMIN HYDROCHLORIDE 500 MG/1
TABLET, EXTENDED RELEASE ORAL
Qty: 180 TABLET | Refills: 3 | Status: SHIPPED | OUTPATIENT
Start: 2023-07-25

## 2023-07-25 RX ORDER — PREGABALIN 150 MG/1
CAPSULE ORAL
Qty: 180 CAPSULE | Refills: 0 | Status: SHIPPED | OUTPATIENT
Start: 2023-07-25

## 2023-07-25 NOTE — PROGRESS NOTES
Patient Name: Gi Velez    Date: 2023  : 1950       MRN: [id number]     Referring Physician: Joe Ruiz*  DX:   Encounter Diagnosis   Name Primary?    Breast neoplasm, Tis (DCIS), right Yes        COMPLETION NOTE      Primary Radiation Oncologist: Joe Sow MD    PRIMARY SITE AND HISTOPATHOLOGY:    Right breast High-grade DCIS, comedo type with microcalcifications, negative for invasive carcinoma, ER strongly positive (80%), VT strongly positive (90%).       STAGE: 0      SIGNIFICANT LAB AND X-RAY FINDINGS:  Gi Velez is a 73 y.o. female who was recently diagnosed with breast cancer after further work-up following suspicious finding on screening mammogram.     Annual screening mammogram on 3/28/2023 at [facility] showed a new loosely clustered microcalcifications in the posterior medial right breast.  No mammographic findings in the left breast indicate malignancy.  BI-RADS: 0-incomplete.     Diagnostic mammogram of the right breast on 4/3/2023 at [facility] showed subtle calcifications within the medial aspect of the right breast.  Stereotactic biopsy recommended for further characterization.  BI-RADS: 4-suspicious abnormality.     Stereotactic biopsy completed on 2023 at [facility]. Pathology revealed high-grade DCIS, comedo type with microcalcifications, negative for invasive carcinoma, ER strongly positive (80%), VT strongly positive (90%).     She underwent right breast lumpectomy with Dr. Ruiz on 2023.  Pathology revealed residual DCIS, size 7 mm, high nuclear grade, completely excised, no invasive carcinoma identified, all margins negative for DCIS with closest margin 3 mm anteriorly, ER strongly +80%, VT strongly +90%.     She was seen for postop follow-up by Dr. Ruiz on 2023.  Patient was referred to our clinic for consideration of radiation therapy      PLAN OF TREATMENT:  Adjuvant whole breast radiation therapy      DATE STARTED:  2023   DATE COMPLETED:   07/25/2023; elapsed days 22      TOTAL DOSE: 4256 cGy in 16 fractions, Right whole breast   DOSE/FRACTION: 266 cGy per fraction  ENERGY:  6 MV photons.   STATUS OF TUMOR/PATIENT AT COMPLETION OF RADIOTHERAPY: no unexpected toxicities or treatment breaks.      TOLERANCE: grade 1 fatigue and radiation dermatitis; started Mometasone and encouraged continued skin moisturizers.      DISPOSITION:  1 week skin check offered; 1 month skin re-assessment ordered.      Current Outpatient Medications:     atorvastatin (LIPITOR) 40 MG tablet, TAKE 1 TABLET BY MOUTH EVERY DAY (Patient taking differently: Take 1 tablet by mouth Every Night.), Disp: 90 tablet, Rfl: 3    benazepril (LOTENSIN) 5 MG tablet, Take 1 tablet by mouth Daily., Disp: 90 tablet, Rfl: 3    BIOTIN PO, Take 1 dose by mouth Every Evening. Ld 5/18, Disp: , Rfl:     Cholecalciferol 1000 units capsule, Take 1 capsule by mouth Every Evening. Ld 5/18, Disp: , Rfl:     CINNAMON PO, Take 1,500 mg by mouth Every Night. Ld 5/18, Disp: , Rfl:     clotrimazole-betamethasone (LOTRISONE) 1-0.05 % cream, Apply 1 application  topically to the appropriate area as directed As Needed., Disp: , Rfl:     Cyanocobalamin (B-12 PO), Take 1,000 mcg by mouth Every Evening. Ld 5/18, Disp: , Rfl:     diphenhydrAMINE (BENADRYL) 25 mg capsule, Take 1 capsule by mouth As Needed for Itching. None preop, Disp: , Rfl:     docusate sodium (COLACE) 100 MG capsule, Take 1 capsule by mouth Daily As Needed. None dos, Disp: , Rfl:     doxylamine (UNISOM) 25 MG tablet, Take 1 tablet by mouth Every Night., Disp: , Rfl:     glimepiride (AMARYL) 4 MG tablet, Take 2 tab ac breakfast. (Patient taking differently: Take 2 tablets by mouth Every Morning Before Breakfast. None am of surgery), Disp: 180 tablet, Rfl: 3    Insulin Pen Needle (NovoFine Plus) 32G X 4 MM misc, Inject 1 Piece under the skin into the appropriate area as directed Every Night., Disp: 100 each, Rfl: 3    metFORMIN ER (GLUCOPHAGE-XR) 500  MG 24 hr tablet, Take 2  tabs ac supper (Patient taking differently: Take 2 tablets by mouth Daily With Dinner. Take 2  tabs ac supper last dose 5/22), Disp: 180 tablet, Rfl: 3    mometasone (Elocon) 0.1 % cream, Apply to affect skin 2-3 times daily during radiation therapy course as needed (itching/irritation), Disp: 45 g, Rfl: 0    Omega-3 Fatty Acids (FISH OIL PO), Take 1,200 mg by mouth Every Evening. Ld 5/18, Disp: , Rfl:     ONE TOUCH ULTRA TEST test strip, 1 each by Other route Daily., Disp: , Rfl: 5    pregabalin (LYRICA) 150 MG capsule, TAKE 1 CAPSULE BY MOUTH TWICE A DAY (Patient taking differently: Take 1 capsule by mouth 2 (Two) Times a Day. Take preop), Disp: 180 capsule, Rfl: 0    Tiadylt  MG 24 hr capsule, TAKE 1 CAPSULE BY MOUTH EVERY DAY (Patient taking differently: Take 1 capsule by mouth Every Night.), Disp: 90 capsule, Rfl: 3    TRESIBA FLEXTOUCH 200 UNIT/ML solution pen-injector, Inject 22 Units under the skin into the appropriate area as directed Daily. None preop, Disp: , Rfl: 5    KPS: 90:  Minor signs or symptoms            cc: Mercedez Guevara MD          Approved Electronically By:  Joe Sow MD, 7/25/2023, 14:14 EDT                              Confidentiality of this medical record shall be maintained except when use or disclosure is required or permitted by law, regulation or written authorization by the patient.

## 2023-07-25 NOTE — TELEPHONE ENCOUNTER
Reviewing chart patient has been on same dose of Lyrica at least since 2016, possibly originally prescribed by Dr. Seipel.

## 2023-08-01 LAB
RAD ONC ARIA COURSE END DATE: NORMAL
RAD ONC ARIA COURSE ID: NORMAL
RAD ONC ARIA COURSE LAST TREATMENT DATE: NORMAL
RAD ONC ARIA COURSE START DATE: NORMAL
RAD ONC ARIA COURSE TREATMENT ELAPSED DAYS: 22
RAD ONC ARIA FIRST TREATMENT DATE: NORMAL
RAD ONC ARIA PLAN FRACTIONS TREATED TO DATE: 16
RAD ONC ARIA PLAN ID: NORMAL
RAD ONC ARIA PLAN NAME: NORMAL
RAD ONC ARIA PLAN PRESCRIBED DOSE PER FRACTION: 2.66 GY
RAD ONC ARIA PLAN PRIMARY REFERENCE POINT: NORMAL
RAD ONC ARIA PLAN TOTAL FRACTIONS PRESCRIBED: 16
RAD ONC ARIA PLAN TOTAL PRESCRIBED DOSE: 4256 CGY
RAD ONC ARIA REFERENCE POINT DOSAGE GIVEN TO DATE: 42.56 GY
RAD ONC ARIA REFERENCE POINT ID: NORMAL

## 2023-08-11 ENCOUNTER — TELEPHONE (OUTPATIENT)
Dept: ENDOCRINOLOGY | Facility: CLINIC | Age: 73
End: 2023-08-11
Payer: MEDICARE

## 2023-08-11 NOTE — TELEPHONE ENCOUNTER
[facility] requesting Chart Notes. Pt has not been seen since 3/6/23.  Upcoming appointment on 10/17/23    Faxed back to [facility].         Bebe Gordon RD, LD, [facility]   Nutrition and Diabetes Education     Diabetes Center   [facility]/[place]  [address] [place] [address]

## 2023-08-25 NOTE — PROGRESS NOTES
RADIATION ONCOLOGY FOLLOW-UP NOTE    NAME: Gi Velez  YOB: 1950  MRN #: [id number]  DATE OF SERVICE: 8/29/2023  PRIMARY CARE PROVIDER: Mercedez Guevara MD    DIAGNOSIS:  Right breast DCIS, high grade, ER 80%, AK 90%    REASON FOR VISIT/CC: Rt breast DCIS, 1M s/p XRT F/U    RADIATION TREATMENT COURSE:  4256 cGy in 16 fractions to whole right breast, completed 07/25/2023.    HISTORY OF PRESENT ILLNESS: The patient is a 73 y.o. year old female who was last seen in our office on 07/25/2023 upon completion of radiation therapy treatment.    She follows with Dr. Sellers was last seen on 07/27/2023 via telehealth. Their plan is to start hormonal therapy with anastrozole, start vitamin D and calcium, DEXA will be due in August 2023, and follow up in 1 month.    No interval imaging.    She has done great from a skin toxicity standpoint, no breast pain.    The following portions of the patient's history were reviewed and updated as appropriate: allergies, current medications, past family history, past medical history, past social history, past surgical history and problem list. Reviewed with the patient and remain unchanged.    PAST MEDICAL HISTORY:  she has a past medical history of Allergies, Diabetes mellitus, Dry skin, Hyperlipidemia (2000), Hypertension (2018), Insomnia, Irregular heart beat (2005), Lymphocytosis (12/2009), Neuropathy, Sleep apnea, Thrombocytopenia (12/2009), and Type 2 diabetes mellitus (1998).    MEDICATIONS:    Current Outpatient Medications:     atorvastatin (LIPITOR) 40 MG tablet, TAKE 1 TABLET BY MOUTH EVERY DAY (Patient taking differently: Take 1 tablet by mouth Every Night.), Disp: 90 tablet, Rfl: 3    benazepril (LOTENSIN) 5 MG tablet, Take 1 tablet by mouth Daily., Disp: 90 tablet, Rfl: 3    BIOTIN PO, Take 1 dose by mouth Every Evening. Ld 5/18, Disp: , Rfl:     Cholecalciferol 1000 units capsule, Take 1 capsule by mouth Every Evening. Ld 5/18, Disp: , Rfl:      CINNAMON PO, Take 1,500 mg by mouth Every Night. Ld 5/18, Disp: , Rfl:     clotrimazole-betamethasone (LOTRISONE) 1-0.05 % cream, Apply 1 application  topically to the appropriate area as directed As Needed., Disp: , Rfl:     Cyanocobalamin (B-12 PO), Take 1,000 mcg by mouth Every Evening. Ld 5/18, Disp: , Rfl:     diphenhydrAMINE (BENADRYL) 25 mg capsule, Take 1 capsule by mouth As Needed for Itching. None preop, Disp: , Rfl:     docusate sodium (COLACE) 100 MG capsule, Take 1 capsule by mouth Daily As Needed. None dos, Disp: , Rfl:     doxylamine (UNISOM) 25 MG tablet, Take 1 tablet by mouth Every Night., Disp: , Rfl:     glimepiride (AMARYL) 4 MG tablet, Take 2 tab ac breakfast. (Patient taking differently: Take 2 tablets by mouth Every Morning Before Breakfast. None am of surgery), Disp: 180 tablet, Rfl: 3    Insulin Pen Needle (NovoFine Plus) 32G X 4 MM misc, Inject 1 Piece under the skin into the appropriate area as directed Every Night., Disp: 100 each, Rfl: 3    metFORMIN ER (GLUCOPHAGE-XR) 500 MG 24 hr tablet, TAKE 2 TABLETS BY MOUTH BEFORE SUPPER, Disp: 180 tablet, Rfl: 3    mometasone (Elocon) 0.1 % cream, Apply to affect skin 2-3 times daily during radiation therapy course as needed (itching/irritation), Disp: 45 g, Rfl: 0    Omega-3 Fatty Acids (FISH OIL PO), Take 1,200 mg by mouth Every Evening. Ld 5/18, Disp: , Rfl:     ONE TOUCH ULTRA TEST test strip, 1 each by Other route Daily., Disp: , Rfl: 5    pregabalin (LYRICA) 150 MG capsule, TAKE 1 CAPSULE BY MOUTH TWICE A DAY, Disp: 180 capsule, Rfl: 0    Tiadylt  MG 24 hr capsule, TAKE 1 CAPSULE BY MOUTH EVERY DAY (Patient taking differently: Take 1 capsule by mouth Every Night.), Disp: 90 capsule, Rfl: 3    TRESIBA FLEXTOUCH 200 UNIT/ML solution pen-injector, Inject 22 Units under the skin into the appropriate area as directed Daily. None preop, Disp: , Rfl: 5    ALLERGIES:    Allergies   Allergen Reactions    Adhesive Tape Other (See Comments)      Blisters      Bee Venom Hives    Topiramate Hives       PAST SURGICAL HISTORY:  she has a past surgical history that includes Hysterectomy; Tubal ligation; Colonoscopy w/ biopsies (07/2021); Shoulder surgery (Left, 10/03/2022); [place] tooth extraction; and Breast lumpectomy (Right, 5/25/2023).    PREVIOUS RADIOTHERAPY OR CHEMOTHERAPY:  XRT    FAMILY HISTORY:  herfamily history includes No Known Problems in an other family member. She was adopted.    SOCIAL HISTORY:  she reports that she has never smoked. She has never been exposed to tobacco smoke. She has never used smokeless tobacco. She reports current alcohol use. She reports that she does not use drugs.    PAIN AND PAIN MANAGEMENT:  no pain.    NUTRITIONAL STATUS:  no issues    KPS:  100: Normal; no evidence of disease      REVIEW OF SYSTEMS:   Review of Systems     Otherwise negative as below.     General: No fevers, chills, weight change, or drenching night sweats. Skin: No rashes or jaundice.  HEENT: No change in vision or hearing, no headaches.  Neck: No dysphagia or masses.  Heme/Lymph: No easy bruising or bleeding.  Respiratory System: No shortness of breath or cough.  Cardiovascular: No chest pain, palpitations, or dyspnea on exertion.  - Pacemaker. GI: No nausea, vomiting, diarrhea, melena, or hematochezia.  : No dysuria or hematuria.  Endocrine: No heat or cold intolerance. Musculoskeletal: No myalgias or arthralgias.  Neuro: No weakness, numbness, syncope, or seizures. Psych: No mood changes or depression. Ext: Denies swelling.    Objective   VITAL SIGNS:  Vitals:    08/29/23 0937   BP: 131/67   Pulse: 77   Resp: 18   Temp: 98 øF (36.7 øC)   SpO2: 100%       PHYSICAL EXAM:  GENERAL: In no apparent distress, sitting comfortably in room.    LYMPHATIC: No cervical, supraclavicular or axillary adenopathy appreciated bilaterally.   CARDIOVASCULAR: S1 & S2 detected; no murmurs, rubs or gallops.  CHEST: Clear to auscultation bilaterally; no wheezes,  crackles or rubs. Work of breathing normal.  MUSCULOSKELETAL: No tenderness to palpation along the spine or scapulae. Normal range of motion.  EXTREMITIES: No clubbing, cyanosis, edema.  SKIN: No erythema, rashes, ulcerations noted.   NEUROLOGIC: Cranial nerves II-XII grossly intact bilaterally. No focal neurologic deficits.  PSYCHIATRIC:  Alert, aware, and appropriate.  BREASTS: Post XRT she has done great, no worrisome findings on exam.      PERTINENT IMAGING/PATHOLOGY/LABS (Medical Decision Making):     COORDINATION OF CARE: A copy of this note is sent to the referring provider.    PATHOLOGY (Reviewed):     IMAGING (Reviewed):     LABS (Reviewed):  HEMATOLOGY:  WBC   Date Value Ref Range Status   05/15/2023 7.30 3.40 - 10.80 10*3/mm3 Final     RBC   Date Value Ref Range Status   05/15/2023 5.33 (H) 3.77 - 5.28 10*6/mm3 Final     Hemoglobin   Date Value Ref Range Status   05/15/2023 14.0 12.0 - 15.9 g/dL Final     Hematocrit   Date Value Ref Range Status   05/15/2023 42.9 34.0 - 46.6 % Final     Platelets   Date Value Ref Range Status   05/15/2023 126 (L) 140 - 450 10*3/mm3 Final     CHEMISTRY:  Lab Results   Component Value Date    GLUCOSE 103 (H) 05/15/2023    BUN 21 05/15/2023    CREATININE 0.96 05/15/2023    EGFRIFNONA 56 (L) 02/07/2022    BCR 21.9 05/15/2023    K 4.3 05/15/2023    CO2 23.1 05/15/2023    CALCIUM 9.9 05/15/2023    ALBUMIN 4.4 05/15/2023    LABIL2 1.4 07/05/2018    AST 16 05/15/2023    ALT 21 05/15/2023     Assessment & Plan   ASSESSMENT AND PLAN:    Right breast DCIS  1 month f/u; doing great  Doing ok on Arimidex    FU here in 6 months  Vit E breast massage discussed over the interval and with plans for breast/skin moisturizing also over this interval.      This assessment comes from my review of the imaging, pathology, physician notes and other pertinent information as mentioned.    DISPOSITION: FU here in 6 months.          CC: Jose Sellers MD      Approved by:     Joe Sow,  MD             good balance

## 2023-08-28 ENCOUNTER — HOSPITAL ENCOUNTER (OUTPATIENT)
Dept: BONE DENSITY | Facility: HOSPITAL | Age: 73
Discharge: HOME | End: 2023-08-28
Admitting: INTERNAL MEDICINE
Payer: MEDICARE

## 2023-08-28 ENCOUNTER — HOSPITAL ENCOUNTER (OUTPATIENT)
Dept: RADIATION ONCOLOGY | Facility: HOSPITAL | Age: 73
Setting detail: RADIATION/ONCOLOGY SERIES
End: 2023-08-28
Payer: MEDICARE

## 2023-08-28 DIAGNOSIS — Z79.4 TYPE 2 DIABETES MELLITUS WITH DIABETIC POLYNEUROPATHY, WITH LONG-TERM CURRENT USE OF INSULIN: Primary | ICD-10-CM

## 2023-08-28 DIAGNOSIS — E11.42 TYPE 2 DIABETES MELLITUS WITH DIABETIC POLYNEUROPATHY, WITH LONG-TERM CURRENT USE OF INSULIN: Primary | ICD-10-CM

## 2023-08-28 PROCEDURE — 77080 DXA BONE DENSITY AXIAL: CPT

## 2023-08-28 RX ORDER — GLIMEPIRIDE 4 MG/1
TABLET ORAL
Qty: 180 TABLET | Refills: 3 | Status: SHIPPED | OUTPATIENT
Start: 2023-08-28

## 2023-08-28 RX ORDER — BENAZEPRIL HYDROCHLORIDE 10 MG/1
TABLET ORAL
Qty: 90 TABLET | Refills: 3 | OUTPATIENT
Start: 2023-08-28

## 2023-08-28 NOTE — TELEPHONE ENCOUNTER
Last visit 3/2023. Next 10/2023.    Per Religious policy, when refills come in if there are any red check marks or if it was a paper/verbal request it has to go to the provider to approve. Forwarding Script to provider.

## 2023-08-29 ENCOUNTER — OFFICE VISIT (OUTPATIENT)
Dept: RADIATION ONCOLOGY | Facility: HOSPITAL | Age: 73
End: 2023-08-29
Payer: MEDICARE

## 2023-08-29 VITALS
OXYGEN SATURATION: 100 % | HEIGHT: 62 IN | RESPIRATION RATE: 18 BRPM | DIASTOLIC BLOOD PRESSURE: 67 MMHG | SYSTOLIC BLOOD PRESSURE: 131 MMHG | WEIGHT: 141 LBS | TEMPERATURE: 98 F | HEART RATE: 77 BPM | BODY MASS INDEX: 25.95 KG/M2

## 2023-08-29 DIAGNOSIS — D05.11 BREAST NEOPLASM, TIS (DCIS), RIGHT: Primary | ICD-10-CM

## 2023-08-29 PROCEDURE — G0463 HOSPITAL OUTPT CLINIC VISIT: HCPCS | Performed by: RADIOLOGY

## 2023-08-29 RX ORDER — ANASTROZOLE 1 MG/1
1 TABLET ORAL DAILY
COMMUNITY

## 2023-09-22 DIAGNOSIS — E11.42 TYPE 2 DIABETES MELLITUS WITH DIABETIC POLYNEUROPATHY, WITH LONG-TERM CURRENT USE OF INSULIN: Primary | ICD-10-CM

## 2023-09-22 DIAGNOSIS — Z79.4 TYPE 2 DIABETES MELLITUS WITH DIABETIC POLYNEUROPATHY, WITH LONG-TERM CURRENT USE OF INSULIN: Primary | ICD-10-CM

## 2023-09-22 RX ORDER — INSULIN DEGLUDEC 200 U/ML
22 INJECTION, SOLUTION SUBCUTANEOUS DAILY
Qty: 9 ML | Refills: 5 | Status: SHIPPED | OUTPATIENT
Start: 2023-09-22

## 2023-09-22 NOTE — TELEPHONE ENCOUNTER
Caller: Gi Velez    Relationship: Self    Best call back number: [phone] (home)       Requested Prescriptions:   Requested Prescriptions     Pending Prescriptions Disp Refills    Tresiba FlexTouch 200 UNIT/ML solution pen-injector pen injection  5     Sig: Inject 22 Units under the skin into the appropriate area as directed Daily. None preop        Pharmacy where request [facility] be sent:  [facility]    Last office visit with prescribing clinician: 3/6/2023   Last telemedicine visit with prescribing clinician: Visit date not found   Next office visit with prescribing clinician: 10/17/2023     Additional details provided by patient:     Does the patient have less than a 3 day supply:  [x] Yes  [] No    Would you like a call back once the refill request has been completed: [x] Yes [] No    If the office needs to give you a call back, can they leave a voicemail: [x] Yes [] No    Gela Toscano   09/22/23 08:04 EDT

## 2023-10-10 ENCOUNTER — LAB (OUTPATIENT)
Dept: LAB | Facility: HOSPITAL | Age: 73
End: 2023-10-10
Payer: MEDICARE

## 2023-10-10 DIAGNOSIS — E55.9 VITAMIN D DEFICIENCY: ICD-10-CM

## 2023-10-10 DIAGNOSIS — E11.42 TYPE 2 DIABETES MELLITUS WITH DIABETIC POLYNEUROPATHY, WITH LONG-TERM CURRENT USE OF INSULIN: ICD-10-CM

## 2023-10-10 DIAGNOSIS — E78.2 MIXED HYPERLIPIDEMIA: ICD-10-CM

## 2023-10-10 DIAGNOSIS — Z79.4 TYPE 2 DIABETES MELLITUS WITH DIABETIC POLYNEUROPATHY, WITH LONG-TERM CURRENT USE OF INSULIN: ICD-10-CM

## 2023-10-10 LAB
25(OH)D3 SERPL-MCNC: 41.2 NG/ML (ref 30–100)
ALBUMIN SERPL-MCNC: 4.4 G/DL (ref 3.5–5.2)
ALBUMIN/GLOB SERPL: 1.4 G/DL
ALP SERPL-CCNC: 94 U/L (ref 39–117)
ALT SERPL W P-5'-P-CCNC: 20 U/L (ref 1–33)
ANION GAP SERPL CALCULATED.3IONS-SCNC: 11.7 MMOL/L (ref 5–15)
AST SERPL-CCNC: 22 U/L (ref 1–32)
BILIRUB SERPL-MCNC: 0.3 MG/DL (ref 0–1.2)
BUN SERPL-MCNC: 15 MG/DL (ref 8–23)
BUN/CREAT SERPL: 15.2 (ref 7–25)
CALCIUM SPEC-SCNC: 10.1 MG/DL (ref 8.6–10.5)
CHLORIDE SERPL-SCNC: 104 MMOL/L (ref 98–107)
CHOLEST SERPL-MCNC: 162 MG/DL (ref 0–200)
CO2 SERPL-SCNC: 26.3 MMOL/L (ref 22–29)
CREAT SERPL-MCNC: 0.99 MG/DL (ref 0.57–1)
EGFRCR SERPLBLD CKD-EPI 2021: 59.6 ML/MIN/1.73
GLOBULIN UR ELPH-MCNC: 3.1 GM/DL
GLUCOSE SERPL-MCNC: 138 MG/DL (ref 65–99)
HBA1C MFR BLD: 9.7 % (ref 4.8–5.6)
HDLC SERPL-MCNC: 32 MG/DL (ref 40–60)
LDLC SERPL CALC-MCNC: 102 MG/DL (ref 0–100)
LDLC/HDLC SERPL: 3.07 {RATIO}
POTASSIUM SERPL-SCNC: 4.4 MMOL/L (ref 3.5–5.2)
PROT SERPL-MCNC: 7.5 G/DL (ref 6–8.5)
SODIUM SERPL-SCNC: 142 MMOL/L (ref 136–145)
TRIGL SERPL-MCNC: 159 MG/DL (ref 0–150)
TSH SERPL DL<=0.05 MIU/L-ACNC: 3.98 UIU/ML (ref 0.27–4.2)
VLDLC SERPL-MCNC: 28 MG/DL (ref 5–40)

## 2023-10-10 PROCEDURE — 82306 VITAMIN D 25 HYDROXY: CPT

## 2023-10-10 PROCEDURE — 80053 COMPREHEN METABOLIC PANEL: CPT

## 2023-10-10 PROCEDURE — 80061 LIPID PANEL: CPT

## 2023-10-10 PROCEDURE — 84443 ASSAY THYROID STIM HORMONE: CPT

## 2023-10-10 PROCEDURE — 36415 COLL VENOUS BLD VENIPUNCTURE: CPT

## 2023-10-10 PROCEDURE — 83036 HEMOGLOBIN GLYCOSYLATED A1C: CPT

## 2023-10-10 NOTE — PROGRESS NOTES
The ABCs of the Annual Wellness Visit  Subsequent Medicare Wellness Visit    Subjective    Gi Velez is a 73 y.o. female who presents for a Subsequent Medicare Wellness Visit.    The following portions of the patient's history were reviewed and   updated as appropriate: allergies, current medications, past family history, past medical history, past social history, past surgical history, and problem list.    Compared to one year ago, the patient feels her physical   health is worse.    Compared to one year ago, the patient feels her mental   health is the same.    Recent Hospitalizations:  She was admitted within the past 365 days at [facility].       Current Medical Providers:  Patient Care Team:  Mercedez Guevara MD as PCP - General (Family Medicine)  Evelyn Gudino, MARCELLE as Nurse Navigator  Joe Ruiz MD as Surgeon (General Surgery)  Vic Cerna MD as Consulting Physician (Endocrinology)  Jose Sellers MD as Consulting Physician (Hematology and Oncology)  Monty Nation MD as Consulting Physician (Ophthalmology)  Joe Sow MD as Consulting Physician (Radiation Oncology)  Yonatan Fernandez Jr., DPM as Consulting Physician (Podiatry)  Uriel Montenegro MD as Consulting Physician (Orthopedic Surgery)    Outpatient Medications Prior to Visit   Medication Sig Dispense Refill    anastrozole (ARIMIDEX) 1 MG tablet Take 1 tablet by mouth Daily.      atorvastatin (LIPITOR) 40 MG tablet TAKE 1 TABLET BY MOUTH EVERY DAY (Patient taking differently: Take 1 tablet by mouth Every Night.) 90 tablet 3    benazepril (LOTENSIN) 5 MG tablet Take 1 tablet by mouth Daily. 90 tablet 3    BIOTIN PO Take 1 dose by mouth Every Evening. Ld 5/18      Cholecalciferol 1000 units capsule Take 1 capsule by mouth Every Evening. Ld 5/18      CINNAMON PO Take 1,500 mg by mouth Every Night. Ld 5/18      clotrimazole-betamethasone (LOTRISONE) 1-0.05 % cream Apply 1 application  topically to the  appropriate area as directed As Needed.      Cyanocobalamin (B-12 PO) Take 1,000 mcg by mouth Every Evening. Ld 5/18      docusate sodium (COLACE) 100 MG capsule Take 1 capsule by mouth Daily As Needed. None dos      glimepiride (AMARYL) 4 MG tablet TAKE 2 TABLETS BY MOUTH EVERY MORNING AT BREAKFAST. 180 tablet 3    Insulin Pen Needle (NovoFine Plus) 32G X 4 MM misc Inject 1 Piece under the skin into the appropriate area as directed Every Night. 100 each 3    Omega-3 Fatty Acids (FISH OIL PO) Take 1,200 mg by mouth Every Evening. Ld 5/18      ONE TOUCH ULTRA TEST test strip 1 each by Other route Daily.  5    doxylamine (UNISOM) 25 MG tablet Take 1 tablet by mouth At Night As Needed for Sleep. (Patient not taking: Reported on 10/17/2023)      metFORMIN ER (GLUCOPHAGE-XR) 500 MG 24 hr tablet TAKE 2 TABLETS BY MOUTH BEFORE SUPPER 180 tablet 3    mometasone (Elocon) 0.1 % cream Apply to affect skin 2-3 times daily during radiation therapy course as needed (itching/irritation) (Patient not taking: Reported on 10/17/2023) 45 g 0    pregabalin (LYRICA) 150 MG capsule TAKE 1 CAPSULE BY MOUTH TWICE A  capsule 0    Tiadylt  MG 24 hr capsule TAKE 1 CAPSULE BY MOUTH EVERY DAY (Patient taking differently: Take 1 capsule by mouth Every Night.) 90 capsule 3    Tresiba FlexTouch 200 UNIT/ML solution pen-injector pen injection Inject 22 Units under the skin into the appropriate area as directed Daily. None preop 9 mL 5    diphenhydrAMINE (BENADRYL) 25 mg capsule Take 1 capsule by mouth As Needed for Itching. None preop (Patient not taking: Reported on 10/13/2023)       No facility-administered medications prior to visit.       No opioid medication identified on active medication list. I have reviewed chart for other potential  high risk medication/s and harmful drug interactions in the elderly.        Aspirin is not on active medication list.  Aspirin use is not indicated based on review of current medical condition/s.  "Risk of harm outweighs potential benefits.  .    Patient Active Problem List   Diagnosis    Thrombocytopenia    Acute ITP    Vitamin B12 deficiency    Iron deficiency    Vitamin D deficiency    Obstructive sleep apnea    Hyperlipidemia    Goiter    Encounter for long-term (current) use of other medications    Diabetic peripheral neuropathy    Type 2 diabetes mellitus with diabetic polyneuropathy, with long-term current use of insulin    Nuclear cataract    Glaucoma suspect    Epiretinal membrane    Chronic fatigue    Insomnia    Overweight (BMI 25.0-29.9)    Initial Medicare annual wellness visit    Need for pneumococcal vaccination    Need for influenza vaccination    Closed fracture of left proximal humerus    Breast neoplasm, Tis (DCIS), right    Primary hypertension    Tachycardia    Medicare annual wellness visit, subsequent    History of incision and drainage    Wound drainage     Advance Care Planning   Advance Care Planning     Advance Directive is not on file.  ACP discussion was held with the patient during this visit. Patient does not have an advance directive, information provided.     Objective    Vitals:    10/13/23 0900   BP: 128/64   BP Location: Right arm   Patient Position: Sitting   Cuff Size: Adult   Pulse: 86   Resp: 18   Temp: 98.4 °F (36.9 °C)   TempSrc: Temporal   SpO2: 97%   Weight: 63.5 kg (140 lb)   Height: 157.5 cm (62\")   PainSc: 0-No pain     Estimated body mass index is 25.61 kg/m² as calculated from the following:    Height as of this encounter: 157.5 cm (62\").    Weight as of this encounter: 63.5 kg (140 lb).       ATTENTION  What is the year: correct  What is the month of the year: correct  What is the day of the week?: correct  What is the date?: correct  MEMORY  Repeat address three times, only score third attempt: Alexys Matthews [address] [place] [address]: 6  HOW MANY ANIMALS DID THE PATIENT NAME  Verbal Fluency -- Animal Names (0-25): 17-21  CLOCK DRAWING  Clock Drawing: " All Correct  MEMORY RECALL  Tell me what you remember about that name and address we were repeating at the beginnin  ACE TOTAL SCORE  Total ACE Score - <25/30 strongly suggests cognitive impairment; <21/30 almost certainly shows dementia: 26      Does the patient have evidence of cognitive impairment? No    Lab Results   Component Value Date    TRIG 159 (H) 10/10/2023    HDL 32 (L) 10/10/2023     (H) 10/10/2023    VLDL 28 10/10/2023    HGBA1C 9.70 (H) 10/10/2023        HEALTH RISK ASSESSMENT    Smoking Status:  Social History     Tobacco Use   Smoking Status Never    Passive exposure: Never   Smokeless Tobacco Never     Alcohol Consumption:  Social History     Substance and Sexual Activity   Alcohol Use Yes    Comment: rare occasion     Fall Risk Screen:    KARSTEN Fall Risk Assessment was completed, and patient is at LOW risk for falls.Assessment completed on:10/13/2023    Depression Screening:      10/13/2023     9:05 AM   PHQ-2/PHQ-9 Depression Screening   Little Interest or Pleasure in Doing Things 0-->not at all   Feeling Down, Depressed or Hopeless 0-->not at all   PHQ-9: Brief Depression Severity Measure Score 0       Health Habits and Functional and Cognitive Screening:      10/13/2023     9:00 AM   Functional & Cognitive Status   Do you have difficulty preparing food and eating? No   Do you have difficulty bathing yourself, getting dressed or grooming yourself? No   Do you have difficulty using the toilet? No   Do you have difficulty moving around from place to place? No   Do you have trouble with steps or getting out of a bed or a chair? No   Current Diet Unhealthy Diet   Dental Exam Not up to date   Eye Exam Up to date   Exercise (times per week) 2 times per week   Current Exercises Include Walking   Do you need help using the phone?  No   Are you deaf or do you have serious difficulty hearing?  No   Do you need help to go to places out of walking distance? No   Do you need help shopping? No    Do you need help preparing meals?  No   Do you need help with housework?  No   Do you need help with laundry? No   Do you need help taking your medications? No   Do you need help managing money? No   Do you ever drive or ride in a car without wearing a seat belt? Yes   Have you felt unusual stress, anger or loneliness in the last month? No   Who do you live with? Alone   If you need help, do you have trouble finding someone available to you? No   Have you been bothered in the last four weeks by sexual problems? No   Do you have difficulty concentrating, remembering or making decisions? No       Age-appropriate Screening Schedule:  Refer to the list below for future screening recommendations based on patient's age, sex and/or medical conditions. Orders for these recommended tests are listed in the plan section. The patient has been provided with a written plan.    Health Maintenance   Topic Date Due    COVID-19 Vaccine (7 - 2023-24 season) 09/01/2023    DIABETIC EYE EXAM  12/05/2023    DIABETIC FOOT EXAM  01/30/2024    MAMMOGRAM  04/03/2024    HEMOGLOBIN A1C  04/10/2024    LIPID PANEL  10/10/2024    ANNUAL WELLNESS VISIT  10/13/2024    URINE MICROALBUMIN  10/13/2024    BMI FOLLOWUP  10/18/2024    DXA SCAN  08/28/2025    TDAP/TD VACCINES (2 - Td or Tdap) 08/15/2029    COLORECTAL CANCER SCREENING  07/21/2031    HEPATITIS C SCREENING  Completed    INFLUENZA VACCINE  Completed    Pneumococcal Vaccine 65+  Completed    ZOSTER VACCINE  Completed                  CMS Preventative Services Quick Reference  Risk Factors Identified During Encounter  Immunizations Discussed/Encouraged: Influenza and COVID19  The above risks/problems have been discussed with the patient.  Pertinent information has been shared with the patient in the After Visit Summary.  An After Visit Summary and PPPS were made available to the patient.    Follow Up:   Next Medicare Wellness visit to be scheduled in 1 year.       Additional E&M Note during  "same encounter follows:  Patient has multiple medical problems which are significant and separately identifiable that require additional work above and beyond the Medicare Wellness Visit.      Chief Complaint  Medicare Wellness-subsequent and Hypertension      Subjective        HPI  Gi Velez is also being seen today for hypertension.    Hypertension  Patient does check blood pressure at home.   Home readings range from  110/48 to 120/60   Patient reports chest pain.believes due to arimidex  Patient states medications is working well.             Objective   Vital Signs:  /64 (BP Location: Right arm, Patient Position: Sitting, Cuff Size: Adult)   Pulse 86   Temp 98.4 °F (36.9 °C) (Temporal)   Resp 18   Ht 157.5 cm (62\")   Wt 63.5 kg (140 lb)   SpO2 97%   BMI 25.61 kg/m²     Physical Exam  Vitals and nursing note reviewed.   Constitutional:       General: She is not in acute distress.     Appearance: She is well-developed.      Comments: Somewhat tired appearing  female   HENT:      Head: Normocephalic and atraumatic.   Cardiovascular:      Rate and Rhythm: Normal rate and regular rhythm.      Heart sounds: No murmur heard.  Pulmonary:      Effort: Pulmonary effort is normal.      Breath sounds: Normal breath sounds. No wheezing.   Musculoskeletal:         General: Normal range of motion.      Comments: Left upper arm with a well-healed, long vertical surgical incision.  Underlying the midportion there is nontender soft fluctuance without any increased warmth, erythema, or drainage.   Skin:     General: Skin is warm and dry.      Findings: No rash.   Neurological:      Mental Status: She is alert and oriented to person, place, and time.            Common labs          2/27/2023    07:37 5/15/2023    08:36 10/10/2023    07:45   Common Labs   Glucose 85  103  138    BUN 12  21  15    Creatinine 1.02  0.96  0.99    Sodium 145  141  142    Potassium 4.1  4.3  4.4    Chloride 107  103  104  "   Calcium 9.7  9.9  10.1    Albumin 4.4  4.4  4.4    Total Bilirubin 0.4  0.3  0.3    Alkaline Phosphatase 96  99  94    AST (SGOT) 25  16  22    ALT (SGPT) 33  21  20    WBC  7.30     Hemoglobin  14.0     Hematocrit  42.9     Platelets  126     Total Cholesterol   162    Triglycerides   159    HDL Cholesterol   32    LDL Cholesterol    102    Hemoglobin A1C 9.1   9.70                 Assessment and Plan   Diagnoses and all orders for this visit:    1. Medicare annual wellness visit, subsequent (Primary)    2. Primary hypertension    3. Chest pain, unspecified type  -     Ambulatory Referral to Cardiology    4. CAMPUZANO (dyspnea on exertion)  -     Ambulatory Referral to Cardiology    5. Type 2 diabetes mellitus with diabetic polyneuropathy, with long-term current use of insulin  -     POCT microalbumin    6. Diabetic peripheral neuropathy  -     pregabalin (LYRICA) 150 MG capsule; Take 1 capsule by mouth 2 (Two) Times a Day.  Dispense: 180 capsule; Refill: 1    7. Seborrheic keratosis  Comments:  low posterior chest bilaterally    8. Left supracondylar humerus fracture, closed, initial encounter    9. Other closed nondisplaced fracture of proximal end of left humerus, sequela    10. Need for influenza vaccination  -     Fluzone High-Dose 65+yrs    11. Overweight (BMI 25.0-29.9)  Assessment & Plan:  Patient's (Body mass index is 25.61 kg/m².) indicates that they are overweight with health conditions that include hypertension, diabetes mellitus, and dyslipidemias . Weight is unchanged. BMI is is above average; BMI management plan is completed. We discussed portion control and increasing exercise.       The patient was counseled regarding nutrition, physical activity, healthy weight, injury prevention, immunizations and preventative health screenings.    Influenza today and will schedule an annual Covid vaccination.     Hypertension at goal continue current medications    Hyperlipidemia at goal continue atorvastatin 40 mg  daily.    Diabetes, poor control, is followed by endocrinology, Dr. Cerna, have appt on Tuesday , had labs this Tuesday. Do have CGM, noticing extreme fluctuations even after eating a low carb meal.  Advised to discuss this with Dr. Cerna at upcoming appointment next week.    Diabetic neuropathy, renewing Lyrica 150 mg twice daily    Chest pain and dyspnea on exertion, referring to cardiologist.    Seborrheic keratoses posterior chest, reassurance given.  Advised to contact myself or see dermatologist if the areas become painful or bleed.    History of left humerus fracture with open reduction internal fixation last year.  There is a nontender soft fluctuant area mid humeral shaft.  Due to underlying hardware I stressed need to schedule follow-up with orthopedist for out any underlying pathology or infection.         Follow Up   Return in about 3 months (around 1/13/2024) for htn.  Patient was given instructions and counseling regarding her condition or for health maintenance advice. Please see specific information pulled into the AVS if appropriate.

## 2023-10-13 ENCOUNTER — OFFICE VISIT (OUTPATIENT)
Dept: FAMILY MEDICINE CLINIC | Facility: CLINIC | Age: 73
End: 2023-10-13
Payer: MEDICARE

## 2023-10-13 VITALS
WEIGHT: 140 LBS | OXYGEN SATURATION: 97 % | RESPIRATION RATE: 18 BRPM | DIASTOLIC BLOOD PRESSURE: 64 MMHG | SYSTOLIC BLOOD PRESSURE: 128 MMHG | HEIGHT: 62 IN | HEART RATE: 86 BPM | TEMPERATURE: 98.4 F | BODY MASS INDEX: 25.76 KG/M2

## 2023-10-13 DIAGNOSIS — R07.9 CHEST PAIN, UNSPECIFIED TYPE: ICD-10-CM

## 2023-10-13 DIAGNOSIS — S42.295S OTHER CLOSED NONDISPLACED FRACTURE OF PROXIMAL END OF LEFT HUMERUS, SEQUELA: ICD-10-CM

## 2023-10-13 DIAGNOSIS — E11.42 DIABETIC PERIPHERAL NEUROPATHY: ICD-10-CM

## 2023-10-13 DIAGNOSIS — L82.1 SEBORRHEIC KERATOSIS: ICD-10-CM

## 2023-10-13 DIAGNOSIS — R06.09 DOE (DYSPNEA ON EXERTION): ICD-10-CM

## 2023-10-13 DIAGNOSIS — Z23 NEED FOR INFLUENZA VACCINATION: ICD-10-CM

## 2023-10-13 DIAGNOSIS — I10 PRIMARY HYPERTENSION: ICD-10-CM

## 2023-10-13 DIAGNOSIS — Z00.00 MEDICARE ANNUAL WELLNESS VISIT, SUBSEQUENT: Primary | ICD-10-CM

## 2023-10-13 DIAGNOSIS — Z79.4 TYPE 2 DIABETES MELLITUS WITH DIABETIC POLYNEUROPATHY, WITH LONG-TERM CURRENT USE OF INSULIN: ICD-10-CM

## 2023-10-13 DIAGNOSIS — E11.42 TYPE 2 DIABETES MELLITUS WITH DIABETIC POLYNEUROPATHY, WITH LONG-TERM CURRENT USE OF INSULIN: ICD-10-CM

## 2023-10-13 DIAGNOSIS — S42.412A LEFT SUPRACONDYLAR HUMERUS FRACTURE, CLOSED, INITIAL ENCOUNTER: ICD-10-CM

## 2023-10-13 DIAGNOSIS — E66.3 OVERWEIGHT (BMI 25.0-29.9): ICD-10-CM

## 2023-10-13 LAB
EXPIRATION DATE: NORMAL
Lab: NORMAL
POC CREATININE URINE: 0
POC MICROALBUMIN URINE: 50

## 2023-10-13 RX ORDER — PREGABALIN 150 MG/1
150 CAPSULE ORAL 2 TIMES DAILY
Qty: 180 CAPSULE | Refills: 1 | Status: SHIPPED | OUTPATIENT
Start: 2023-10-13

## 2023-10-13 NOTE — ASSESSMENT & PLAN NOTE
Patient's (Body mass index is 25.61 kg/m².) indicates that they are overweight with health conditions that include hypertension, diabetes mellitus, and dyslipidemias . Weight is unchanged. BMI is is above average; BMI management plan is completed. We discussed portion control and increasing exercise.

## 2023-10-17 ENCOUNTER — OFFICE VISIT (OUTPATIENT)
Dept: ENDOCRINOLOGY | Facility: CLINIC | Age: 73
End: 2023-10-17
Payer: MEDICARE

## 2023-10-17 ENCOUNTER — TELEPHONE (OUTPATIENT)
Dept: FAMILY MEDICINE CLINIC | Facility: CLINIC | Age: 73
End: 2023-10-17

## 2023-10-17 VITALS
OXYGEN SATURATION: 98 % | DIASTOLIC BLOOD PRESSURE: 80 MMHG | BODY MASS INDEX: 25.43 KG/M2 | WEIGHT: 138.2 LBS | HEART RATE: 97 BPM | HEIGHT: 62 IN | SYSTOLIC BLOOD PRESSURE: 130 MMHG

## 2023-10-17 DIAGNOSIS — Z79.4 TYPE 2 DIABETES MELLITUS WITH DIABETIC POLYNEUROPATHY, WITH LONG-TERM CURRENT USE OF INSULIN: Primary | ICD-10-CM

## 2023-10-17 DIAGNOSIS — E11.42 TYPE 2 DIABETES MELLITUS WITH DIABETIC POLYNEUROPATHY, WITH LONG-TERM CURRENT USE OF INSULIN: Primary | ICD-10-CM

## 2023-10-17 DIAGNOSIS — E55.9 VITAMIN D DEFICIENCY: ICD-10-CM

## 2023-10-17 DIAGNOSIS — E78.2 MIXED HYPERLIPIDEMIA: ICD-10-CM

## 2023-10-17 PROCEDURE — 3079F DIAST BP 80-89 MM HG: CPT | Performed by: INTERNAL MEDICINE

## 2023-10-17 PROCEDURE — 99214 OFFICE O/P EST MOD 30 MIN: CPT | Performed by: INTERNAL MEDICINE

## 2023-10-17 PROCEDURE — 3075F SYST BP GE 130 - 139MM HG: CPT | Performed by: INTERNAL MEDICINE

## 2023-10-17 PROCEDURE — 3046F HEMOGLOBIN A1C LEVEL >9.0%: CPT | Performed by: INTERNAL MEDICINE

## 2023-10-17 RX ORDER — INSULIN DEGLUDEC 200 U/ML
INJECTION, SOLUTION SUBCUTANEOUS
Start: 2023-10-17

## 2023-10-17 RX ORDER — INSULIN ASPART 100 [IU]/ML
INJECTION, SOLUTION INTRAVENOUS; SUBCUTANEOUS
Qty: 15 ML | Refills: 3 | Status: SHIPPED | OUTPATIENT
Start: 2023-10-17

## 2023-10-17 NOTE — TELEPHONE ENCOUNTER
Caller: Gi Velez    Relationship: Self    Best call back number: [phone]    What medication are you requesting: ANTIBIOTIC     What are your current symptoms: PATIENT STATES THAT ON HER LEFT ARM SHE HAS A SPOT THAT DR. MONGE HAS SEEN. PATIENT STATES THAT LAST NIGHT IT STARTED TO DRAIN.       Have you had these symptoms before:    [] Yes  [x] No    Have you been treated for these symptoms before:   [] Yes  [x] No    If a prescription is needed, what is your preferred pharmacy and phone number: [facility] #[id number] - [place], [place] - [address] [phone] [facility] [phone]      Additional notes: PATIENT STATES THAT SHE CALLED HER ORTHOPEDISTS TO HAVE THEM LOOK AT IT, BUT THEY ARE BOOKED UP FOR TWO MORE WEEKS. PLEASE ADVISE PATIENT WHEN THIS HAS BEEN CALLED IN. PATIENT IS REQUESTING A CALL.

## 2023-10-17 NOTE — PATIENT INSTRUCTIONS
See eye doctor as scheduled.  Increase exercise as able. Do stretching.  Stop metformin.  Decrease Tresiba to 20 units in am.  Add Novolog 6 units before breakfast & lunch.  Take extra 2 units if blood sugar is over 200.  Continue other diabetes & lipid meds & vit D supplements.  Decrease calcium to one daily.  Call if blood sugars are running under 100 or over 200.  F/u in 6 months, with labs prior.

## 2023-10-17 NOTE — PROGRESS NOTES
Chief Complaint  Drainage from Incision    History of Present Illness  Gi Velez presents today for incision drainage.    Location of drainage: left upper arm  Onset of this was 10/16/2023.  Symptoms include: drainage, redness, minor bleeding, and nausea.  Patient denies: fever, vomiting, abdominal pain, or headaches.  Profuse drainage of yellow tinged drainage from spot on   States she has not been able to get ahold of Dr Montenegro, surgeon who did ORIF left humerus1 year ago.  States she has repeatedly gotten a busy signal.    Patient Care Team:  Mercedez Guevara MD as PCP - General (Family Medicine)  Evelyn Gudino, MARCELLE as Nurse Navigator  Joe Ruiz MD as Surgeon (General Surgery)  Vic Cerna MD as Consulting Physician (Endocrinology)  Jose Sellers MD as Consulting Physician (Hematology and Oncology)  Monty Nation MD as Consulting Physician (Ophthalmology)  Joe Sow MD as Consulting Physician (Radiation Oncology)  Yonatan Fernandez Jr., DPM as Consulting Physician (Podiatry)  Uriel Montenegro MD as Consulting Physician (Orthopedic Surgery)   Current Outpatient Medications on File Prior to Visit   Medication Sig    anastrozole (ARIMIDEX) 1 MG tablet Take 1 tablet by mouth Daily.    atorvastatin (LIPITOR) 40 MG tablet TAKE 1 TABLET BY MOUTH EVERY DAY (Patient taking differently: Take 1 tablet by mouth Every Night.)    benazepril (LOTENSIN) 5 MG tablet Take 1 tablet by mouth Daily.    BIOTIN PO Take 1 dose by mouth Every Evening. Ld 5/18    Cholecalciferol 1000 units capsule Take 1 capsule by mouth Every Evening. Ld 5/18    CINNAMON PO Take 1,500 mg by mouth Every Night. Ld 5/18    clotrimazole-betamethasone (LOTRISONE) 1-0.05 % cream Apply 1 application  topically to the appropriate area as directed As Needed.    Cyanocobalamin (B-12 PO) Take 1,000 mcg by mouth Every Evening. Ld 5/18    docusate sodium (COLACE) 100 MG capsule Take 1 capsule by mouth Daily As Needed.  "None dos    ELDERBERRY PO Take 2 capsules by mouth Daily.    glimepiride (AMARYL) 4 MG tablet TAKE 2 TABLETS BY MOUTH EVERY MORNING AT BREAKFAST.    insulin aspart (NovoLOG FlexPen) 100 UNIT/ML solution pen-injector sc pen Inject 6 units before breakfast & lunch. Take extra 2 units if bl sugar >200.. MDD 30 units    Insulin Pen Needle (NovoFine Plus) 32G X 4 MM misc Inject 1 Piece under the skin into the appropriate area as directed Every Night.    Omega-3 Fatty Acids (FISH OIL PO) Take 1,200 mg by mouth Every Evening. Ld 5/18    ONE TOUCH ULTRA TEST test strip 1 each by Other route Daily.    pregabalin (LYRICA) 150 MG capsule Take 1 capsule by mouth 2 (Two) Times a Day.    Tresiba FlexTouch 200 UNIT/ML solution pen-injector pen injection Inject 20 units daily.    [DISCONTINUED] diphenhydrAMINE (BENADRYL) 25 mg capsule Take 1 capsule by mouth As Needed for Itching. None preop (Patient not taking: Reported on 10/13/2023)    [DISCONTINUED] doxylamine (UNISOM) 25 MG tablet Take 1 tablet by mouth At Night As Needed for Sleep. (Patient not taking: Reported on 10/17/2023)     No current facility-administered medications on file prior to visit.       Objective   Vital Signs:   /68 (BP Location: Right arm, Patient Position: Sitting, Cuff Size: Adult)   Pulse 76   Temp 98.4 °F (36.9 °C) (Temporal)   Resp 18   Ht 157.5 cm (62\")   Wt 63 kg (139 lb)   SpO2 96%   BMI 25.42 kg/m²    BP Readings from Last 3 Encounters:   10/18/23 128/68   10/17/23 130/80   10/13/23 128/64     Wt Readings from Last 3 Encounters:   10/18/23 63 kg (139 lb)   10/17/23 62.7 kg (138 lb 3.2 oz)   10/13/23 63.5 kg (140 lb)         Physical Exam  Vitals and nursing note reviewed.   Constitutional:       General: She is not in acute distress.     Appearance: She is well-developed.   HENT:      Head: Normocephalic and atraumatic.   Eyes:      General: No scleral icterus.     Conjunctiva/sclera: Conjunctivae normal.      Comments: Pupils are " equal and round   Pulmonary:      Effort: Pulmonary effort is normal.   Musculoskeletal:      Comments: Left upper arm with long vertical surgical scar.  Mid scar with 4 mm raised area with minimal amount of serous drainage.  Area is cleansed with alcohol wound culture obtained.  Inferiorly there is a small area there is absence of subcutaneous tissue palpable.   Skin:     General: Skin is dry.   Neurological:      Mental Status: She is alert and oriented to person, place, and time.   Psychiatric:         Mood and Affect: Mood normal.         Thought Content: Thought content normal.            No visits with results within 1 Day(s) from this visit.   Latest known visit with results is:   Office Visit on 10/13/2023   Component Date Value Ref Range Status    Microalbumin, Urine 10/13/2023 50   Final    Creatinine, Urine 10/13/2023 0   Final    Lot Number 10/13/2023 [id number]   Final    Expiration Date 10/13/2023 [id number]   Final     A1C Last 3 Results          2/27/2023    07:37 10/10/2023    07:45   HGBA1C Last 3 Results   Hemoglobin A1C 9.1  9.70      Lab Results   Component Value Date    CHOL 162 10/10/2023    TRIG 159 (H) 10/10/2023    HDL 32 (L) 10/10/2023     (H) 10/10/2023     Lab Results   Component Value Date    TSH 3.980 10/10/2023     Lab Results   Component Value Date    GLUCOSE 138 (H) 10/10/2023    BUN 15 10/10/2023    CREATININE 0.99 10/10/2023    EGFRIFNONA 56 (L) 02/07/2022    BCR 15.2 10/10/2023    K 4.4 10/10/2023    CO2 26.3 10/10/2023    CALCIUM 10.1 10/10/2023    ALBUMIN 4.4 10/10/2023    LABIL2 1.4 07/05/2018    AST 22 10/10/2023    ALT 20 10/10/2023     Lab Results   Component Value Date    WBC 7.30 05/15/2023    HGB 14.0 05/15/2023    HCT 42.9 05/15/2023    MCV 80.5 05/15/2023     (L) 05/15/2023                      Assessment and Plan    Diagnoses and all orders for this visit:    1. Postprocedural seroma of a musculoskeletal structure following other procedure (Primary)  -      cephalexin (Keflex) 500 MG capsule; Take 1 capsule by mouth 4 (Four) Times a Day.  Dispense: 40 capsule; Refill: 0  -     Cancel: Culture, Routine - Swab, Arm, Left; Future  -     Culture, Routine - Swab, Arm, Left    2. Closed fracture of proximal end of left humerus, unspecified fracture morphology, sequela    3. Wound drainage  -     Ambulatory Referral to Orthopedic Surgery  -     Cancel: Culture, Routine - Swab, Arm, Left; Future  -     Culture, Routine - Swab, Arm, Left      Draining seroma overlying surgical scar of 1-year-old open reduction internal fixation left humerus.  Wound culture collected, started on Keflex to cover potential infection.  Had placed referral to previous orthopedist to evaluate for possible underlying cause.    Medications Discontinued During This Encounter   Medication Reason    diphenhydrAMINE (BENADRYL) 25 mg capsule *Therapy completed    doxylamine (UNISOM) 25 MG tablet *Therapy completed         Follow Up     Return in 3 months (on 1/12/2024) for as previously scheduled or as needed for persistent or increased symptoms..    Patient was given instructions and counseling regarding her condition or for health maintenance advice. Please see specific information pulled into the AVS if appropriate.

## 2023-10-18 ENCOUNTER — OFFICE VISIT (OUTPATIENT)
Dept: FAMILY MEDICINE CLINIC | Facility: CLINIC | Age: 73
End: 2023-10-18
Payer: MEDICARE

## 2023-10-18 VITALS
OXYGEN SATURATION: 96 % | BODY MASS INDEX: 25.58 KG/M2 | HEIGHT: 62 IN | TEMPERATURE: 98.4 F | DIASTOLIC BLOOD PRESSURE: 68 MMHG | HEART RATE: 76 BPM | RESPIRATION RATE: 18 BRPM | WEIGHT: 139 LBS | SYSTOLIC BLOOD PRESSURE: 128 MMHG

## 2023-10-18 DIAGNOSIS — S42.202S CLOSED FRACTURE OF PROXIMAL END OF LEFT HUMERUS, UNSPECIFIED FRACTURE MORPHOLOGY, SEQUELA: ICD-10-CM

## 2023-10-18 DIAGNOSIS — L24.A9 WOUND DRAINAGE: ICD-10-CM

## 2023-10-18 DIAGNOSIS — M96.843 POSTPROCEDURAL SEROMA OF A MUSCULOSKELETAL STRUCTURE FOLLOWING OTHER PROCEDURE: Primary | ICD-10-CM

## 2023-10-18 PROBLEM — S42.202D CLOSED FRACTURE OF PROXIMAL END OF LEFT HUMERUS WITH ROUTINE HEALING: Status: ACTIVE | Noted: 2022-11-03

## 2023-10-18 PROBLEM — S42.412A LEFT SUPRACONDYLAR HUMERUS FRACTURE, CLOSED, INITIAL ENCOUNTER: Status: RESOLVED | Noted: 2022-09-29 | Resolved: 2023-10-18

## 2023-10-18 PROBLEM — Z98.890 HISTORY OF INCISION AND DRAINAGE: Status: ACTIVE | Noted: 2023-10-18

## 2023-10-18 PROBLEM — T14.8XXA WOUND DRAINAGE: Status: ACTIVE | Noted: 2023-10-18

## 2023-10-18 RX ORDER — CEPHALEXIN 500 MG/1
500 CAPSULE ORAL 4 TIMES DAILY
Qty: 40 CAPSULE | Refills: 0 | Status: SHIPPED | OUTPATIENT
Start: 2023-10-18

## 2023-10-18 NOTE — ASSESSMENT & PLAN NOTE
Patient's (Body mass index is 25.42 kg/m².) indicates that they are overweight with health conditions that include hypertension, diabetes mellitus, and dyslipidemias . Weight is unchanged. BMI is is above average; BMI management plan is completed. We discussed portion control and increasing exercise.

## 2023-10-19 ENCOUNTER — OFFICE VISIT (OUTPATIENT)
Dept: CARDIOLOGY | Facility: CLINIC | Age: 73
End: 2023-10-19
Payer: MEDICARE

## 2023-10-19 VITALS
DIASTOLIC BLOOD PRESSURE: 74 MMHG | OXYGEN SATURATION: 98 % | HEART RATE: 80 BPM | BODY MASS INDEX: 25.76 KG/M2 | WEIGHT: 140 LBS | SYSTOLIC BLOOD PRESSURE: 137 MMHG | HEIGHT: 62 IN

## 2023-10-19 DIAGNOSIS — E11.9 TYPE 2 DIABETES MELLITUS WITHOUT COMPLICATION, WITHOUT LONG-TERM CURRENT USE OF INSULIN: ICD-10-CM

## 2023-10-19 DIAGNOSIS — I20.9 ANGINA PECTORIS: Primary | ICD-10-CM

## 2023-10-19 DIAGNOSIS — R06.02 SHORTNESS OF BREATH: ICD-10-CM

## 2023-10-19 DIAGNOSIS — E78.00 PURE HYPERCHOLESTEROLEMIA: ICD-10-CM

## 2023-10-19 DIAGNOSIS — I10 PRIMARY HYPERTENSION: ICD-10-CM

## 2023-10-19 NOTE — PROGRESS NOTES
"    Subjective:     Encounter Date:10/19/2023      Patient ID: Gi Velez is a 73 y.o. female.    Chief Complaint:  History of Present Illness 72-year-old white female with history of diabetes hypertension hyperlipidemia and history of breast cancer status post recent radiation and chemotherapy presents to my office for a new consultation.  Patient has been having symptoms of chest pain which she describes as a substernal discomfort without radiation but also some shortness of breath.  No complaint of any PND orthopnea.  No palpitation dizziness syncope or swelling of the feet.  Patient has been taking all her medicines regularly.  Patient does not smoke.  /74   Pulse 80   Ht 157.5 cm (62.01\")   Wt 63.5 kg (140 lb)   SpO2 98%   BMI 25.60 kg/m²     The following portions of the patient's history were reviewed and updated as appropriate: allergies, current medications, past family history, past medical history, past social history, past surgical history, and problem list.  Past Medical History:   Diagnosis Date    Allergies     Diabetes mellitus     diagnosed in the 1990's    Dry skin     feet    Hyperlipidemia 2000    Hypertension 2018    Insomnia     Irregular heart beat 2005    rare episode  no need to follow with cardiology    Left supracondylar humerus fracture, closed, initial encounter     Lymphocytosis 12/2009    Neuropathy     diagnosed in the 1990's    Sleep apnea     no machine    Thrombocytopenia 12/2009    low normal current    Type 2 diabetes mellitus 1998     Past Surgical History:   Procedure Laterality Date    BREAST LUMPECTOMY Right 5/25/2023    Procedure: WIRE LOCALIZED RIGHT LUMPECTOMY;  Surgeon: Joe Ruiz MD;  Location: [facility] OR;  Service: General;  Laterality: Right;    COLONOSCOPY W/ BIOPSIES  07/2021    HYSTERECTOMY      in the 1980's-increased bleeding  bilateral oophorectomy    SHOULDER SURGERY Left 10/03/2022    and arm    TUBAL ABDOMINAL LIGATION      WISDOM " TOOTH EXTRACTION       Social History     Socioeconomic History    Marital status:     Number of children: 2    Years of education: 15   Tobacco Use    Smoking status: Never     Passive exposure: Never    Smokeless tobacco: Never   Vaping Use    Vaping Use: Never used   Substance and Sexual Activity    Alcohol use: Yes     Comment: rare occasion    Drug use: Never    Sexual activity: Defer     Family History   Adopted: Yes   Problem Relation Age of Onset    No Known Problems Other        Current Outpatient Medications:     anastrozole (ARIMIDEX) 1 MG tablet, Take 1 tablet by mouth Daily., Disp: , Rfl:     atorvastatin (LIPITOR) 40 MG tablet, TAKE 1 TABLET BY MOUTH EVERY DAY (Patient taking differently: Take 1 tablet by mouth Every Night.), Disp: 90 tablet, Rfl: 3    benazepril (LOTENSIN) 5 MG tablet, Take 1 tablet by mouth Daily., Disp: 90 tablet, Rfl: 3    BIOTIN PO, Take 1 dose by mouth Every Evening. Ld 5/18, Disp: , Rfl:     cephalexin (Keflex) 500 MG capsule, Take 1 capsule by mouth 4 (Four) Times a Day., Disp: 40 capsule, Rfl: 0    Cholecalciferol 1000 units capsule, Take 1 capsule by mouth Every Evening. Ld 5/18, Disp: , Rfl:     CINNAMON PO, Take 1,500 mg by mouth Every Night. Ld 5/18, Disp: , Rfl:     clotrimazole-betamethasone (LOTRISONE) 1-0.05 % cream, Apply 1 application  topically to the appropriate area as directed As Needed., Disp: , Rfl:     Cyanocobalamin (B-12 PO), Take 1,000 mcg by mouth Every Evening. Ld 5/18, Disp: , Rfl:     docusate sodium (COLACE) 100 MG capsule, Take 1 capsule by mouth Daily As Needed. None dos, Disp: , Rfl:     ELDERBERRY PO, Take 2 capsules by mouth Daily., Disp: , Rfl:     glimepiride (AMARYL) 4 MG tablet, TAKE 2 TABLETS BY MOUTH EVERY MORNING AT BREAKFAST., Disp: 180 tablet, Rfl: 3    insulin aspart (NovoLOG FlexPen) 100 UNIT/ML solution pen-injector sc pen, Inject 6 units before breakfast & lunch. Take extra 2 units if bl sugar >200.. MDD 30 units, Disp: 15 mL,  Rfl: 3    Insulin Pen Needle (NovoFine Plus) 32G X 4 MM misc, Inject 1 Piece under the skin into the appropriate area as directed Every Night., Disp: 100 each, Rfl: 3    Omega-3 Fatty Acids (FISH OIL PO), Take 1,200 mg by mouth Every Evening. Ld 5/18, Disp: , Rfl:     ONE TOUCH ULTRA TEST test strip, 1 each by Other route Daily., Disp: , Rfl: 5    pregabalin (LYRICA) 150 MG capsule, Take 1 capsule by mouth 2 (Two) Times a Day., Disp: 180 capsule, Rfl: 1    Tresiba FlexTouch 200 UNIT/ML solution pen-injector pen injection, Inject 20 units daily., Disp: , Rfl:   Allergies   Allergen Reactions    Adhesive Tape Other (See Comments)     Blisters      Bee Venom Hives    Topamax [Topiramate] Hives       Review of Systems   Constitutional: Positive for malaise/fatigue.   Cardiovascular:  Positive for chest pain. Negative for dyspnea on exertion, leg swelling and palpitations.   Respiratory:  Positive for shortness of breath. Negative for cough.    Gastrointestinal:  Negative for abdominal pain, nausea and vomiting.   Neurological:  Negative for dizziness, focal weakness, headaches, light-headedness and numbness.   All other systems reviewed and are negative.             Objective:     Constitutional:       Appearance: Well-developed.   Eyes:      General: No scleral icterus.     Conjunctiva/sclera: Conjunctivae normal.   HENT:      Head: Normocephalic and atraumatic.   Neck:      Vascular: No carotid bruit or JVD.   Pulmonary:      Effort: Pulmonary effort is normal.      Breath sounds: Normal breath sounds. No wheezing. No rales.   Cardiovascular:      Normal rate. Regular rhythm.   Pulses:     Intact distal pulses.   Abdominal:      General: Bowel sounds are normal.      Palpations: Abdomen is soft.   Musculoskeletal:      Cervical back: Normal range of motion and neck supple. Skin:     General: Skin is warm and dry.      Findings: No rash.   Neurological:      Mental Status: Alert.           ECG 12 Lead    Date/Time:  10/19/2023 12:29 PM  Performed by: Jeffy Noyola MD    Authorized by: Jeffy Noyola MD  Comments: Sinus rhythm  Nonspecific T wave abnormality  Abnormal EKG  No previous ECGs available          Lab Review:       Assessment:          Diagnosis Plan   1. Angina pectoris        2. Primary hypertension        3. Type 2 diabetes mellitus without complication, without long-term current use of insulin        4. Pure hypercholesterolemia        5. Shortness of breath               Plan:    Patient presented with chest pain and shortness of breath and has risk factors for coronary disease.  Patient also has history of hypertension diabetes and hyperlipidemia  Patient's EKG shows abnormality with lateral wall T wave abnormalities  Patient will have an echocardiogram for LV function valvular abnormalities  Patient will also have a stress Myoview study to rule out ischemia  Patient has history of breast cancer and received chemotherapy and also radiation and followed by the oncologist.

## 2023-10-20 ENCOUNTER — TELEPHONE (OUTPATIENT)
Dept: FAMILY MEDICINE CLINIC | Facility: CLINIC | Age: 73
End: 2023-10-20
Payer: MEDICARE

## 2023-10-20 DIAGNOSIS — S42.202S CLOSED FRACTURE OF PROXIMAL END OF LEFT HUMERUS, UNSPECIFIED FRACTURE MORPHOLOGY, SEQUELA: Primary | ICD-10-CM

## 2023-10-20 DIAGNOSIS — L24.A9 WOUND DRAINAGE: ICD-10-CM

## 2023-10-20 DIAGNOSIS — M96.843 POSTPROCEDURAL SEROMA OF A MUSCULOSKELETAL STRUCTURE FOLLOWING OTHER PROCEDURE: ICD-10-CM

## 2023-10-20 NOTE — PROGRESS NOTES
[facility]        Patient Care Team:  Mercedez Guevara MD as PCP - General (Family Medicine)  Evelyn Gudino RN as Nurse Navigator  Joe Ruiz MD as Surgeon (General Surgery)  Vic Cerna MD as Consulting Physician (Endocrinology)  Jose Sellers MD as Consulting Physician (Hematology and Oncology)  Monty Nation MD as Consulting Physician (Ophthalmology)  Joe Sow MD as Consulting Physician (Radiation Oncology)  Yonatan Fernandez Jr., DPM as Consulting Physician (Podiatry)  Uriel Montenergo MD as Consulting Physician (Orthopedic Surgery)    Chief Complaint:    Chief Complaint   Patient presents with   • Diabetes     FU /  DM type 2 /            Subjective   Here for diabetes f/u  Blood sugars: high in the afternoon. Using [place] CGMS  Pt has agreed to wear the CGM device and share readings on a regular basis with our office  Exercise program: not much  Taking vit D  Taking calcium 2 / d    Interval History:     Patient Complaints:  diarrhea  Patient Denies: severe hypoglycemia  History taken from: patient    Review of Systems:   Review of Systems   Constitutional:  Positive for fatigue.   HENT:  Negative for trouble swallowing.    Eyes:  Negative for blurred vision.   Gastrointestinal:  Positive for diarrhea. Negative for nausea.   Endocrine: Negative for polyuria.   Skin:  Positive for wound.   Neurological:  Negative for headache.   Lost  4 lb since last visit    Objective     Vital Signs     Vitals:    10/17/23 0827   BP: 130/80   Pulse: 97   SpO2: 98%         Physical Exam:     General Appearance:    Alert, cooperative, in no acute distress   Head:    Normocephalic, without obvious abnormality, atraumatic   Eyes:            Lids and lashes normal, conjunctivae and sclerae normal, no   icterus, no pallor, corneas clear, PERRLA   Throat:   No oral lesions,  oral mucosa moist   Neck:   36 cm in circumference, thyroid firm, no carotid bruit    Lungs:     Clear     Heart:    Regular rhythm and normal rate   Chest Wall:    No abnormalities observed   Abdomen:     Normal bowel sounds, soft                 Extremities:   2 cm open wound on L arm scar, w hyperemia               Pulses:   Pulses palpable and equal bilaterally   Skin:   Dry. Plantar calluses   Neurologic:  DTR absent in ankles, vibratory sense 90% decreased in toes, able to feel the 10g monofilament ( same as 1y ago )            Results Review:    I have reviewed the patient's new clinical results, labs & imaging.    Medication Review:   Prior to Admission medications    Medication Sig Start Date End Date Taking? Authorizing Provider   anastrozole (ARIMIDEX) 1 MG tablet Take 1 tablet by mouth Daily.   Yes Ros Shah MD   atorvastatin (LIPITOR) 40 MG tablet TAKE 1 TABLET BY MOUTH EVERY DAY  Patient taking differently: Take 1 tablet by mouth Every Night. 3/27/23  Yes Mercedez Guevara MD   benazepril (LOTENSIN) 5 MG tablet Take 1 tablet by mouth Daily. 3/10/23  Yes Mercedez Guevara MD   BIOTIN PO Take 1 dose by mouth Every Evening.  5/18 4/10/18  Yes Ros Shah MD   Cholecalciferol 1000 units capsule Take 1 capsule by mouth Every Evening.  5/18 3/31/15  Yes Ros Shah MD   CINNAMON PO Take 1,500 mg by mouth Every Night.  5/18 4/10/18  Yes Ros Shah MD   clotrimazole-betamethasone (LOTRISONE) 1-0.05 % cream Apply 1 application  topically to the appropriate area as directed As Needed. 12/26/21  Yes Ros Shah MD   Cyanocobalamin (B-12 PO) Take 1,000 mcg by mouth Every Evening.  5/18 4/10/18  Yes Ros Shah MD   docusate sodium (COLACE) 100 MG capsule Take 1 capsule by mouth Daily As Needed. None dos   Yes Ros Shah MD   glimepiride (AMARYL) 4 MG tablet TAKE 2 TABLETS BY MOUTH EVERY MORNING AT BREAKFAST. 8/28/23  Yes Vic Cerna MD   Insulin Pen Needle (NovoFine Plus) 32G X 4 MM misc  Inject 1 Piece under the skin into the appropriate area as directed Every Night. 6/26/23  Yes Vic Cerna MD   Omega-3 Fatty Acids (FISH OIL PO) Take 1,200 mg by mouth Every Evening. Ld 5/18 4/11/17  Yes Ros Shah MD   ONE TOUCH ULTRA TEST test strip 1 each by Other route Daily. 6/19/19  Yes Ros Shah MD   pregabalin (LYRICA) 150 MG capsule Take 1 capsule by mouth 2 (Two) Times a Day. 10/13/23  Yes Mercedez Guevara MD   Tresiba FlexTouch 200 UNIT/ML solution pen-injector pen injection Inject 20 units daily. 10/17/23  Yes Vic Cerna MD   cephalexin (Keflex) 500 MG capsule Take 1 capsule by mouth 4 (Four) Times a Day. 10/18/23   Mercedez Guevara MD   ELDERBERRY PO Take 2 capsules by mouth Daily.    Ros Shah MD   insulin aspart (NovoLOG FlexPen) 100 UNIT/ML solution pen-injector sc pen Inject 6 units before breakfast & lunch. Take extra 2 units if bl sugar >200.. MDD 30 units 10/17/23   Vic Cerna MD         Lab Results   Component Value Date    HGBA1C 9.70 (H) 10/10/2023    HGBA1C 9.1 (H) 02/27/2023    HGBA1C 8.8 (H) 08/01/2022      Lab Results   Component Value Date    GLUCOSE 138 (H) 10/10/2023    BUN 15 10/10/2023    CREATININE 0.99 10/10/2023    EGFRIFNONA 56 (L) 02/07/2022    BCR 15.2 10/10/2023    K 4.4 10/10/2023    CO2 26.3 10/10/2023    CALCIUM 10.1 10/10/2023    ALBUMIN 4.4 10/10/2023    LABIL2 1.4 07/05/2018    AST 22 10/10/2023    ALT 20 10/10/2023    CHOL 162 10/10/2023     (H) 10/10/2023    HDL 32 (L) 10/10/2023    TRIG 159 (H) 10/10/2023     Lab Results   Component Value Date    TSH 3.980 10/10/2023    IIBR62KP 41.2 10/10/2023     Microalb/cr ratio 50    Assessment & Plan     Diagnoses and all orders for this visit:    1. Type 2 diabetes mellitus with diabetic polyneuropathy, with long-term current use of insulin (Primary)  -     Hemoglobin A1c; Future  -     Comprehensive Metabolic Panel; Future  -     Tresiba  FlexTouch 200 UNIT/ML solution pen-injector pen injection; Inject 20 units daily.  -     insulin aspart (NovoLOG FlexPen) 100 UNIT/ML solution pen-injector sc pen; Inject 6 units before breakfast & lunch. Take extra 2 units if bl sugar >200.. MDD 30 units  Dispense: 15 mL; Refill: 3    2. Vitamin D deficiency    3. Mixed hyperlipidemia    Glucose control worse. Lipids improved. Vit D stable.    See eye doctor as scheduled.  Increase exercise as able. Do stretching.  Stop metformin.  Decrease Tresiba to 20 units in am.  Add Novolog 6 units before breakfast & lunch.  Take extra 2 units if blood sugar is over 200.  Continue other diabetes & lipid meds & vit D supplements.  Decrease calcium to one daily.  Call if blood sugars are running under 100 or over 200.        Vic Cerna MD  10/20/23  12:49 EDT

## 2023-10-20 NOTE — TELEPHONE ENCOUNTER
This urgent referral was in [facility] and was stat.  Zee has called the office of Dr. Montenegro at [facility], however they don't do wound drainage. They recommended [facility], she called them and they can't get her in until Oct 30.   Do we want to schedule that day or have her go to the ER?

## 2023-10-20 NOTE — TELEPHONE ENCOUNTER
Referral was not for wound care this was for a potential complication of previous orthopedic surgery.  Concern for potential problem with underlying hardware or osteomyelitis.  If the surgeon who did the surgery will not see her, have her get an x-ray of the arm and follow-up with me next week and I will determine if additional evaluation will be needed.  Wound culture was collected on the 18th, can you check to make sure there is not a preliminary report available and if not resulted by Monday, check again.

## 2023-10-25 LAB
BACTERIA SPEC CULT: NORMAL
MICROORGANISM/AGENT SPEC: NORMAL

## 2023-10-30 ENCOUNTER — TELEPHONE (OUTPATIENT)
Dept: FAMILY MEDICINE CLINIC | Facility: CLINIC | Age: 73
End: 2023-10-30

## 2023-10-30 NOTE — TELEPHONE ENCOUNTER
Can you please find out what surgery is scheduled and get a copy of the consultation note (as she just saw him today I would not expect report to be available yet).   (This was the patient I tried to refer to Dr. Montenegro and he was refusing to see her for surgical wound redirecting to wound management)

## 2023-10-30 NOTE — TELEPHONE ENCOUNTER
Caller: Gi Velez    Relationship: Self    Best call back number:     [phone] (Home)     What is the best time to reach you: ANYTIME    Who are you requesting to speak with (clinical staff, provider,  specific staff member): CLINICAL STAFF/ DR MONGE    Do you know the name of the person who called: Gi Velez    What was the call regarding: PATIENT CALLED TO LET DR MONGE THAT PATIENT SAW DR ARMAS TODAY 10/30/2023 AND THAT HE SCHEDULED PATIENT FOR SURGERY ON THURSDAY 11/02/2023, PLEASE CALL PATIENT IF THERE ARE ANY QUESTIONS    Is it okay if the provider responds through vogogohart: PLEASE CALL PATIENT IF THERE ARE ANY QUESTIONS

## 2023-10-30 NOTE — TELEPHONE ENCOUNTER
Thank you, I have reviewed the note.    For future reference: She stated she had finished her Keflex and Dr. Montenegro  started her on Bactrim and doxycycline for 2 weeks.  X-rays today did not show any sign of deep infection or implant failure aspirate fluid from the arm.  He has scheduled her for left arm implant removal and wound exploration.

## 2023-10-31 ENCOUNTER — TELEPHONE (OUTPATIENT)
Dept: FAMILY MEDICINE CLINIC | Facility: CLINIC | Age: 73
End: 2023-10-31

## 2023-10-31 NOTE — TELEPHONE ENCOUNTER
Please inform patient I received refill request for benazepril (Lotensin) 10 mg.  Chart looks like this was reduced to 5 mg in March.  Please ask if she has continued to take or restarted 10 mg dose or if pharmacy is requesting wrong strength?

## 2023-11-01 RX ORDER — BENAZEPRIL HYDROCHLORIDE 10 MG/1
10 TABLET ORAL DAILY
Qty: 90 TABLET | Refills: 3 | OUTPATIENT
Start: 2023-11-01

## 2023-11-01 NOTE — TELEPHONE ENCOUNTER
"Called and no answer.  Hub may relay the following:  \"Please inform patient I received refill request for benazepril (Lotensin) 10 mg.  Chart looks like this was reduced to 5 mg in March.  Please ask if she has continued to take or restarted 10 mg dose or if pharmacy is requesting wrong strength?\"  "

## 2023-11-03 ENCOUNTER — TELEPHONE (OUTPATIENT)
Dept: ENDOCRINOLOGY | Facility: CLINIC | Age: 73
End: 2023-11-03
Payer: MEDICARE

## 2023-11-20 ENCOUNTER — HOSPITAL ENCOUNTER (OUTPATIENT)
Dept: CARDIOLOGY | Facility: HOSPITAL | Age: 73
Discharge: HOME | End: 2023-11-20
Payer: MEDICARE

## 2023-11-20 DIAGNOSIS — E78.00 PURE HYPERCHOLESTEROLEMIA: ICD-10-CM

## 2023-11-20 DIAGNOSIS — E11.9 TYPE 2 DIABETES MELLITUS WITHOUT COMPLICATION, WITHOUT LONG-TERM CURRENT USE OF INSULIN: ICD-10-CM

## 2023-11-20 DIAGNOSIS — R06.02 SHORTNESS OF BREATH: ICD-10-CM

## 2023-11-20 DIAGNOSIS — I10 PRIMARY HYPERTENSION: ICD-10-CM

## 2023-11-20 DIAGNOSIS — I20.9 ANGINA PECTORIS: ICD-10-CM

## 2023-11-20 LAB
BH CV ECHO MEAS - ACS: 1.82 CM
BH CV ECHO MEAS - AI P1/2T: 635.8 MSEC
BH CV ECHO MEAS - AO MAX PG: 7.6 MMHG
BH CV ECHO MEAS - AO MEAN PG: 4.1 MMHG
BH CV ECHO MEAS - AO ROOT DIAM: 3 CM
BH CV ECHO MEAS - AO V2 MAX: 138 CM/SEC
BH CV ECHO MEAS - AO V2 VTI: 30.1 CM
BH CV ECHO MEAS - AVA(I,D): 2.05 CM2
BH CV ECHO MEAS - EDV(CUBED): 64.3 ML
BH CV ECHO MEAS - EDV(MOD-SP4): 49.3 ML
BH CV ECHO MEAS - EF(MOD-BP): 65 %
BH CV ECHO MEAS - EF(MOD-SP4): 64.8 %
BH CV ECHO MEAS - ESV(CUBED): 18.7 ML
BH CV ECHO MEAS - ESV(MOD-SP4): 17.4 ML
BH CV ECHO MEAS - FS: 33.8 %
BH CV ECHO MEAS - IVS/LVPW: 0.84 CM
BH CV ECHO MEAS - IVSD: 0.87 CM
BH CV ECHO MEAS - LA DIMENSION: 4 CM
BH CV ECHO MEAS - LV MASS(C)D: 119.4 GRAMS
BH CV ECHO MEAS - LV MAX PG: 5.2 MMHG
BH CV ECHO MEAS - LV MEAN PG: 2.43 MMHG
BH CV ECHO MEAS - LV V1 MAX: 113.7 CM/SEC
BH CV ECHO MEAS - LV V1 VTI: 25.1 CM
BH CV ECHO MEAS - LVIDD: 4 CM
BH CV ECHO MEAS - LVIDS: 2.7 CM
BH CV ECHO MEAS - LVOT AREA: 2.46 CM2
BH CV ECHO MEAS - LVOT DIAM: 1.77 CM
BH CV ECHO MEAS - LVPWD: 1.04 CM
BH CV ECHO MEAS - MR MAX PG: 81.9 MMHG
BH CV ECHO MEAS - MR MAX VEL: 452.3 CM/SEC
BH CV ECHO MEAS - MV A MAX VEL: 90.1 CM/SEC
BH CV ECHO MEAS - MV DEC SLOPE: 424.7 CM/SEC2
BH CV ECHO MEAS - MV DEC TIME: 0.22 SEC
BH CV ECHO MEAS - MV E MAX VEL: 92.3 CM/SEC
BH CV ECHO MEAS - MV E/A: 1.02
BH CV ECHO MEAS - MV MAX PG: 3.9 MMHG
BH CV ECHO MEAS - MV MEAN PG: 1.51 MMHG
BH CV ECHO MEAS - MV V2 VTI: 39.1 CM
BH CV ECHO MEAS - MVA(VTI): 1.57 CM2
BH CV ECHO MEAS - PA ACC TIME: 0.14 SEC
BH CV ECHO MEAS - PULM A REVS DUR: 0.09 SEC
BH CV ECHO MEAS - PULM A REVS VEL: 20.8 CM/SEC
BH CV ECHO MEAS - PULM DIAS VEL: 46.7 CM/SEC
BH CV ECHO MEAS - PULM S/D: 1.15
BH CV ECHO MEAS - PULM SYS VEL: 53.5 CM/SEC
BH CV ECHO MEAS - RAP SYSTOLE: 3 MMHG
BH CV ECHO MEAS - RV MAX PG: 1.84 MMHG
BH CV ECHO MEAS - RV V1 MAX: 67.8 CM/SEC
BH CV ECHO MEAS - RV V1 VTI: 14.4 CM
BH CV ECHO MEAS - RVDD: 3.1 CM
BH CV ECHO MEAS - RVSP: 17.1 MMHG
BH CV ECHO MEAS - SV(LVOT): 61.6 ML
BH CV ECHO MEAS - SV(MOD-SP4): 31.9 ML
BH CV ECHO MEAS - TR MAX PG: 14.1 MMHG
BH CV ECHO MEAS - TR MAX VEL: 185 CM/SEC
BH CV REST NUCLEAR ISOTOPE DOSE: 11.3 MCI
BH CV STRESS BP STAGE 1: NORMAL
BH CV STRESS COMMENTS STAGE 1: NORMAL
BH CV STRESS DOSE REGADENOSON STAGE 1: 0.4
BH CV STRESS DURATION MIN STAGE 1: 0
BH CV STRESS DURATION SEC STAGE 1: 10
BH CV STRESS HR STAGE 1: 75
BH CV STRESS NUCLEAR ISOTOPE DOSE: 32.8 MCI
BH CV STRESS PROTOCOL 1: NORMAL
BH CV STRESS RECOVERY BP: NORMAL MMHG
BH CV STRESS RECOVERY HR: 102 BPM
BH CV STRESS STAGE 1: 1
LV EF NUC BP: 81 %
MAXIMAL PREDICTED HEART RATE: 145 BPM
STRESS BASELINE BP: NORMAL MMHG
STRESS BASELINE HR: 75 BPM
STRESS TARGET HR: 123 BPM

## 2023-11-20 PROCEDURE — 25010000002 REGADENOSON 0.4 MG/5ML SOLUTION: Performed by: INTERNAL MEDICINE

## 2023-11-20 PROCEDURE — A9500 TC99M SESTAMIBI: HCPCS | Performed by: INTERNAL MEDICINE

## 2023-11-20 PROCEDURE — 93017 CV STRESS TEST TRACING ONLY: CPT

## 2023-11-20 PROCEDURE — 0 TECHNETIUM SESTAMIBI: Performed by: INTERNAL MEDICINE

## 2023-11-20 PROCEDURE — 78452 HT MUSCLE IMAGE SPECT MULT: CPT

## 2023-11-20 PROCEDURE — 93306 TTE W/DOPPLER COMPLETE: CPT

## 2023-11-20 PROCEDURE — 93306 TTE W/DOPPLER COMPLETE: CPT | Performed by: INTERNAL MEDICINE

## 2023-11-20 RX ORDER — REGADENOSON 0.08 MG/ML
0.4 INJECTION, SOLUTION INTRAVENOUS
Status: COMPLETED | OUTPATIENT
Start: 2023-11-20 | End: 2023-11-20

## 2023-11-20 RX ADMIN — REGADENOSON 0.4 MG: 0.08 INJECTION, SOLUTION INTRAVENOUS at 09:59

## 2023-11-20 RX ADMIN — TECHNETIUM TC 99M SESTAMIBI 1 DOSE: 1 INJECTION INTRAVENOUS at 09:59

## 2023-11-20 RX ADMIN — TECHNETIUM TC 99M SESTAMIBI 1 DOSE: 1 INJECTION INTRAVENOUS at 08:29

## 2023-11-27 ENCOUNTER — TELEPHONE (OUTPATIENT)
Dept: ENDOCRINOLOGY | Facility: CLINIC | Age: 73
End: 2023-11-27

## 2023-11-27 NOTE — TELEPHONE ENCOUNTER
Caller: GEMINI HALL    Relationship: SELF    Best call back number: [phone]    Requested Prescriptions: Insulin Pen Needle (NovoFine Plus) 32G X 4 MM misc 90 DAY   Requested Prescriptions      No prescriptions requested or ordered in this encounter        Pharmacy where request should be sent:[facility]       Last office visit with prescribing clinician: 10/17/2023   Last telemedicine visit with prescribing clinician: Visit date not found   Next office visit with prescribing clinician: 6/4/2024     Additional details provided by patient: PT GIVES HERSELF INSULIN 3 X'S A DAY     Does the patient have less than a 3 day supply:  [x] Yes  [] No    Would you like a call back once the refill request has been completed: [x] Yes [] No    If the office needs to give you a call back, can they leave a voicemail: [x] Yes [] No    Gela Diaz Rep   11/27/23 14:05 EST

## 2023-11-29 ENCOUNTER — OFFICE VISIT (OUTPATIENT)
Dept: SURGERY | Facility: CLINIC | Age: 73
End: 2023-11-29
Payer: MEDICARE

## 2023-11-29 VITALS
DIASTOLIC BLOOD PRESSURE: 62 MMHG | HEART RATE: 84 BPM | SYSTOLIC BLOOD PRESSURE: 136 MMHG | OXYGEN SATURATION: 97 % | WEIGHT: 144.2 LBS | BODY MASS INDEX: 26.54 KG/M2 | TEMPERATURE: 98.6 F | RESPIRATION RATE: 18 BRPM | HEIGHT: 62 IN

## 2023-11-29 DIAGNOSIS — D05.11 BREAST NEOPLASM, TIS (DCIS), RIGHT: Primary | ICD-10-CM

## 2023-11-29 PROCEDURE — 99213 OFFICE O/P EST LOW 20 MIN: CPT | Performed by: SURGERY

## 2023-11-29 PROCEDURE — 3075F SYST BP GE 130 - 139MM HG: CPT | Performed by: SURGERY

## 2023-11-29 PROCEDURE — 1159F MED LIST DOCD IN RCRD: CPT | Performed by: SURGERY

## 2023-11-29 PROCEDURE — 3078F DIAST BP <80 MM HG: CPT | Performed by: SURGERY

## 2023-11-29 PROCEDURE — 1160F RVW MEDS BY RX/DR IN RCRD: CPT | Performed by: SURGERY

## 2023-11-29 RX ORDER — DILTIAZEM HYDROCHLORIDE 120 MG/1
1 CAPSULE, EXTENDED RELEASE ORAL DAILY
COMMUNITY
Start: 2023-10-19

## 2023-11-29 RX ORDER — SULFAMETHOXAZOLE AND TRIMETHOPRIM 800; 160 MG/1; MG/1
TABLET ORAL
COMMUNITY
Start: 2023-11-16

## 2023-11-29 RX ORDER — DOXYCYCLINE HYCLATE 100 MG/1
CAPSULE ORAL
COMMUNITY
Start: 2023-11-17

## 2023-11-29 NOTE — PROGRESS NOTES
GENERAL SURGERY ESTABLISHED PATIENT NOTE    Patient Care Team:  Mercedez Guevara MD as PCP - General (Family Medicine)  Joe Ruiz MD as Surgeon (General Surgery)  Vic Cerna MD as Consulting Physician (Endocrinology)  Jose Sellers MD as Consulting Physician (Hematology and Oncology)  Monty Nation MD as Consulting Physician (Ophthalmology)  Joe Sow MD as Consulting Physician (Radiation Oncology)  Yonatan Fernandez Jr., DPM as Consulting Physician (Podiatry)  Uriel Montenegro MD as Consulting Physician (Orthopedic Surgery)    Reason for follow-up: 6-month follow-up for right breast DCIS    Subjective     Patient is a 73 y.o. female presents for 6-month follow-up for right breast DCIS.  The patient underwent right wire localized lumpectomy on 5/25/2023.  She completed 16 treatments of radiation therapy without any complaints.  She follows up with Dr. Cabral and is now on endocrine therapy.  She states that at first she had some intermittent chest pain and fatigue, but that has gotten better.  She is pleased with her cosmetic outcome, has no issues with range of motion or pain.    Review of Systems   Genitourinary:  Negative for breast discharge, breast lump and breast pain.   Hematological:  Negative for adenopathy. Does not bruise/bleed easily.        History  Past Medical History:   Diagnosis Date    Allergies     Diabetes mellitus     diagnosed in the 1990's    Dry skin     feet    Hyperlipidemia 2000    Hypertension 2018    Insomnia     Irregular heart beat 2005    rare episode  no need to follow with cardiology    Left supracondylar humerus fracture, closed, initial encounter     Lymphocytosis 12/2009    Neuropathy     diagnosed in the 1990's    Sleep apnea     no machine    Thrombocytopenia 12/2009    low normal current    Type 2 diabetes mellitus 1998     Past Surgical History:   Procedure Laterality Date    BREAST LUMPECTOMY Right 5/25/2023    Procedure: WIRE  LOCALIZED RIGHT LUMPECTOMY;  Surgeon: Joe Ruiz MD;  Location: [facility] MAIN OR;  Service: General;  Laterality: Right;    COLONOSCOPY W/ BIOPSIES  07/2021    HYSTERECTOMY      in the 1980's-increased bleeding  bilateral oophorectomy    SHOULDER SURGERY Left 10/03/2022    and arm    TUBAL ABDOMINAL LIGATION      WISDOM TOOTH EXTRACTION       Family History   Adopted: Yes   Problem Relation Age of Onset    No Known Problems Other      Social History     Tobacco Use    Smoking status: Never     Passive exposure: Never    Smokeless tobacco: Never   Vaping Use    Vaping Use: Never used   Substance Use Topics    Alcohol use: Yes     Comment: rare occasion    Drug use: Never     (Not in a hospital admission)    Allergies:  Adhesive tape, Bee venom, and Topamax [topiramate]    Objective     Vital Signs  Temp:  [98.6 °F (37 °C)] 98.6 °F (37 °C)  Heart Rate:  [84] 84  Resp:  [18] 18  BP: (136)/(62) 136/62    Physical Exam  Vitals reviewed. Exam conducted with a chaperone present.   Constitutional:       Appearance: She is well-developed.   HENT:      Head: Normocephalic and atraumatic.   Eyes:      Pupils: Pupils are equal, round, and reactive to light.   Cardiovascular:      Rate and Rhythm: Normal rate and regular rhythm.   Pulmonary:      Effort: Pulmonary effort is normal.      Breath sounds: Normal breath sounds.   Chest:      Comments: Well-healed transverse right breast incision without any surrounding erythema, ration, or drainage to suggest infection.  Fine fibrocystic changes scattered bilaterally.  No overlying skin changes, no discharge from the nipple bilaterally.  Abdominal:      General: There is no distension.      Palpations: Abdomen is soft.      Tenderness: There is no abdominal tenderness.      Hernia: No hernia is present.   Musculoskeletal:         General: Normal range of motion.      Cervical back: Normal range of motion.   Lymphadenopathy:      Cervical: No cervical adenopathy.       Upper Body:      Right upper body: No supraclavicular or axillary adenopathy.      Left upper body: No supraclavicular or axillary adenopathy.   Skin:     General: Skin is warm and dry.      Findings: No rash.   Neurological:      Mental Status: She is alert and oriented to person, place, and time.         Results Review:   Lab Results (last 24 hours)       ** No results found for the last 24 hours. **          No radiology results for the last day      I reviewed the patient's new imaging results and agree with the interpretation.  I reviewed the patient's other test results and agree with the interpretation    Assessment & Plan   Right breast DCIS    Follow-up with medical oncology as directed.  Continue endocrine therapy and repeat diagnostic mammogram at 1 year  Follow-up with me in 6 months or sooner if issues arise  Follow-up with radiation oncology as directed    I discussed the patients findings and my recommendations with the patient.     Joe Ruiz MD  11/29/23  09:29 EST

## 2023-12-06 DIAGNOSIS — Z79.4 TYPE 2 DIABETES MELLITUS WITH DIABETIC POLYNEUROPATHY, WITH LONG-TERM CURRENT USE OF INSULIN: ICD-10-CM

## 2023-12-06 DIAGNOSIS — E11.42 TYPE 2 DIABETES MELLITUS WITH DIABETIC POLYNEUROPATHY, WITH LONG-TERM CURRENT USE OF INSULIN: ICD-10-CM

## 2023-12-06 RX ORDER — PEN NEEDLE, DIABETIC 32GX 5/32"
1 NEEDLE, DISPOSABLE MISCELLANEOUS NIGHTLY
Qty: 400 EACH | Refills: 3 | Status: SHIPPED | OUTPATIENT
Start: 2023-12-06

## 2024-01-09 ENCOUNTER — TRANSCRIBE ORDERS (OUTPATIENT)
Dept: ADMINISTRATIVE | Facility: HOSPITAL | Age: 74
End: 2024-01-09
Payer: MEDICARE

## 2024-01-09 DIAGNOSIS — Z12.31 ENCOUNTER FOR SCREENING MAMMOGRAM FOR BREAST CANCER: Primary | ICD-10-CM

## 2024-01-09 NOTE — PROGRESS NOTES
"Chief Complaint  Hypertension    History of Present Illness  Gi Velez presents today for hypertension follow up    Hypertension  Patient does check blood pressure at home occasionally. Was elevated around 155 at Dr Sellers's office on Tuesday.   Home readings range from  120's/50's to 130's/60's per patient/patient submitted blood pressure diary.  Patient denies  blurred vision, chest pain, dyspnea, headache, neck aches, orthopnea, palpitations, paroxysmal nocturnal dyspnea, peripheral edema, pulsating in the ears, and tiredness/fatigue   Patient reports they are taking medications as prescribed and they are not having side effects.  She is currently on benazepril 5 mg and diltiazem 120 mg daily.    Vitals:    01/12/24 1014 01/12/24 1021   BP: 152/68 142/80   BP Location: Right arm Right arm   Patient Position: Sitting Sitting   Cuff Size: Adult Adult   Pulse: 83    Resp: 18    Temp: 98.4 °F (36.9 °C)    TempSrc: Temporal    SpO2: 97%    Weight: 65.7 kg (144 lb 12.8 oz)    Height: 154.9 cm (61\")         Patient Care Team:  Mercedez Guevara MD as PCP - General (Family Medicine)  Joe Ruiz MD as Surgeon (General Surgery)  Vic Cerna MD as Consulting Physician (Endocrinology)  Jose Sellers MD as Consulting Physician (Hematology and Oncology)  Monty Nation MD as Consulting Physician (Ophthalmology)  Joe Sow MD as Consulting Physician (Radiation Oncology)  Yonatan Fernandez Jr., DPM as Consulting Physician (Podiatry)  Uriel Montenegro MD as Consulting Physician (Orthopedic Surgery)   Current Outpatient Medications on File Prior to Visit   Medication Sig    anastrozole (ARIMIDEX) 1 MG tablet Take 1 tablet by mouth Daily.    atorvastatin (LIPITOR) 40 MG tablet TAKE 1 TABLET BY MOUTH EVERY DAY (Patient taking differently: Take 1 tablet by mouth Every Night.)    BIOTIN PO Take 1 dose by mouth Every Evening. Ld 5/18    Calcium Carbonate-Vit D-Min (CALCIUM 1200 PO) Take 1 " capsule by mouth Daily.    Cholecalciferol 1000 units capsule Take 1 capsule by mouth Every Evening. Ld 5/18    CINNAMON PO Take 1,500 mg by mouth Every Night. Ld 5/18    clotrimazole-betamethasone (LOTRISONE) 1-0.05 % cream Apply 1 application  topically to the appropriate area as directed As Needed.    Cyanocobalamin (B-12 PO) Take 1,000 mcg by mouth Every Evening. Ld 5/18    dilTIAZem SR (CARDIZEM SR) 120 MG 12 hr capsule Take 1 capsule by mouth Daily.    docusate sodium (COLACE) 100 MG capsule Take 1 capsule by mouth Daily As Needed. None dos    ELDERBERRY PO Take 2 capsules by mouth Daily.    glimepiride (AMARYL) 4 MG tablet TAKE 2 TABLETS BY MOUTH EVERY MORNING AT BREAKFAST.    insulin aspart (NovoLOG FlexPen) 100 UNIT/ML solution pen-injector sc pen Inject 6 units before breakfast & lunch. Take extra 2 units if bl sugar >200.. MDD 30 units    Insulin Pen Needle (1st Tier Unifine Pentips) 32G X 4 MM misc Inject 1 Piece under the skin into the appropriate area as directed Every Night.    Insulin Pen Needle (NovoFine Plus) 32G X 4 MM misc Inject 1 Piece under the skin into the appropriate area as directed Every Night.    Omega-3 Fatty Acids (FISH OIL PO) Take 1,200 mg by mouth Every Evening. Ld 5/18    ONE TOUCH ULTRA TEST test strip 1 each by Other route Daily.    pregabalin (LYRICA) 150 MG capsule Take 1 capsule by mouth 2 (Two) Times a Day.    Tresiba FlexTouch 200 UNIT/ML solution pen-injector pen injection Inject 20 units daily.    [DISCONTINUED] benazepril (LOTENSIN) 5 MG tablet Take 1 tablet by mouth Daily.    [DISCONTINUED] doxycycline (VIBRAMYCIN) 100 MG capsule PLEASE SEE ATTACHED FOR DETAILED DIRECTIONS (Patient not taking: Reported on 1/12/2024)    [DISCONTINUED] sulfamethoxazole-trimethoprim (BACTRIM DS,SEPTRA DS) 800-160 MG per tablet TAKE 1 TABLET BY MOUTH TWICE A DAY FOR 2 WEEKS (Patient not taking: Reported on 1/12/2024)    [DISCONTINUED] Tiadylt  MG 24 hr capsule Take 1 capsule by mouth  "Daily. (Patient not taking: Reported on 1/12/2024)     No current facility-administered medications on file prior to visit.       Objective   Vital Signs:   /80 (BP Location: Right arm, Patient Position: Sitting, Cuff Size: Adult)   Pulse 83   Temp 98.4 °F (36.9 °C) (Temporal)   Resp 18   Ht 154.9 cm (61\")   Wt 65.7 kg (144 lb 12.8 oz)   SpO2 97%   BMI 27.36 kg/m²    BP Readings from Last 3 Encounters:   01/12/24 142/80   11/29/23 136/62   10/19/23 137/74     Wt Readings from Last 3 Encounters:   01/12/24 65.7 kg (144 lb 12.8 oz)   11/29/23 65.4 kg (144 lb 3.2 oz)   10/19/23 63.5 kg (140 lb)         Physical Exam  Vitals and nursing note reviewed.   Constitutional:       General: She is not in acute distress.     Appearance: Normal appearance. She is well-developed.   HENT:      Head: Normocephalic and atraumatic.   Eyes:      General: No scleral icterus.     Conjunctiva/sclera: Conjunctivae normal.      Comments: Pupils are equal and round   Cardiovascular:      Rate and Rhythm: Normal rate and regular rhythm.      Heart sounds: No murmur heard.  Pulmonary:      Effort: Pulmonary effort is normal.      Breath sounds: Normal breath sounds. No wheezing.   Musculoskeletal:         General: Normal range of motion.      Comments: Left upper arm with long vertical surgical scar.  Previous mid to upper scar lesion/drainage has closed down and very scarred.  There is a at the lower aspect of the surgical scar now with with minimal amount of yellowish serous drainage on bandage.     Skin:     General: Skin is warm and dry.      Findings: No rash.   Neurological:      Mental Status: She is alert and oriented to person, place, and time.   Psychiatric:         Mood and Affect: Mood normal.         Thought Content: Thought content normal.            No visits with results within 1 Day(s) from this visit.   Latest known visit with results is:   Hospital Outpatient Visit on 11/20/2023   Component Date Value Ref Range " Status     CV STRESS PROTOCOL 1 11/20/2023 Pharmacologic   Final    Stage 1 11/20/2023 1.0   Final    HR Stage 1 11/20/2023 75   Final    BP Stage 1 11/20/2023 122/76   Final    Duration Min Stage 1 11/20/2023 0   Final    Duration Sec Stage 1 11/20/2023 10   Final    Stress Dose Regadenoson Stage 1 11/20/2023 0.40   Final    Stress Comments Stage 1 11/20/2023 10 sec bolus injection   Final    Baseline HR 11/20/2023 75  bpm Final    Baseline BP 11/20/2023 122/76  mmHg Final    Recovery HR 11/20/2023 102  bpm Final    Recovery BP 11/20/2023 124/60  mmHg Final    Target HR (85%) 11/20/2023 123  bpm Final    Max. Pred. HR (100%) 11/20/2023 145  bpm Final    BH CV REST NUCLEAR ISOTOPE DOSE 11/20/2023 11.3  mCi Final    BH CV STRESS NUCLEAR ISOTOPE DOSE 11/20/2023 32.8  mCi Final    Nuc Stress EF 11/20/2023 81  % Final     A1C Last 3 Results          2/27/2023    07:37 10/10/2023    07:45   HGBA1C Last 3 Results   Hemoglobin A1C 9.1  9.70      Lab Results   Component Value Date    CHOL 162 10/10/2023    TRIG 159 (H) 10/10/2023    HDL 32 (L) 10/10/2023     (H) 10/10/2023     Lab Results   Component Value Date    TSH 3.980 10/10/2023     Lab Results   Component Value Date    GLUCOSE 138 (H) 10/10/2023    BUN 15 10/10/2023    CREATININE 0.99 10/10/2023    EGFRIFNONA 56 (L) 02/07/2022    BCR 15.2 10/10/2023    K 4.4 10/10/2023    CO2 26.3 10/10/2023    CALCIUM 10.1 10/10/2023    ALBUMIN 4.4 10/10/2023    LABIL2 1.4 07/05/2018    AST 22 10/10/2023    ALT 20 10/10/2023     Lab Results   Component Value Date    WBC 7.30 05/15/2023    HGB 14.0 05/15/2023    HCT 42.9 05/15/2023    MCV 80.5 05/15/2023     (L) 05/15/2023                      Assessment and Plan    Diagnoses and all orders for this visit:    1. Primary hypertension (Primary)  -     benazepril (LOTENSIN) 10 MG tablet; Take 1 tablet by mouth Daily.  Dispense: 90 tablet; Refill: 3    2. Overweight (BMI 25.0-29.9)    3. Diabetic peripheral  neuropathy    4. Ductal carcinoma in situ of right breast    5. Postprocedural seroma of a musculoskeletal structure following other procedure      Hypertension above goal in office with home blood pressure readings at goal.  Discussed increasing diltiazem versus benazepril.  She states previously benazepril was reduced from 10-5 when she was having low diastolic readings.  She was not symptomatic.  Increasing from 5 to 10 mg daily, advised to watch for symptoms of low blood pressure including dizziness, lightheadedness, near-syncope, weakness or tiredness and contact office for advice blood pressure is low.    History of breast cancer. Did complete x-ray therapy 16 treatments. Continuing Arimidex.  Continues to follow with Dr. Joe Sow, radiation oncology and surgeon Dr. Joe Ruiz.  Oncologist, Dr. Sellers is leaving private practice, transferring records to [facility] in [place]. Scheduled for Mammogram 3/29/24 at [place]    Since last visit with myself the upper arm wound has closed.  She has completed 4 courses of antibiotics.  She has been following with Dr. Montenegro who has hypothesized that she could be having an allergic reaction to the hardware and may ultimately need hardware removal.  Since that time a lower lesion has burst and continued to drain.  She is not having any fevers or signs of infection other than clear fluid drainage. Will follow up with Dr Downey. Have obtained cardiac clearance for surgery. Appt next month.     Diabetes managed by Dr. Cerna    Did encourage getting annual COVID and RSV vaccinations.    Medications Discontinued During This Encounter   Medication Reason    Tiadylt  MG 24 hr capsule Duplicate order    doxycycline (VIBRAMYCIN) 100 MG capsule *Therapy completed    sulfamethoxazole-trimethoprim (BACTRIM DS,SEPTRA DS) 800-160 MG per tablet *Therapy completed    benazepril (LOTENSIN) 5 MG tablet Reorder         Follow Up     Return in about 3 months  (around 4/12/2024) for htn etc.    Patient was given instructions and counseling regarding her condition or for health maintenance advice. Please see specific information pulled into the AVS if appropriate.

## 2024-01-12 ENCOUNTER — OFFICE VISIT (OUTPATIENT)
Dept: FAMILY MEDICINE CLINIC | Facility: CLINIC | Age: 74
End: 2024-01-12
Payer: MEDICARE

## 2024-01-12 VITALS
OXYGEN SATURATION: 97 % | BODY MASS INDEX: 27.34 KG/M2 | DIASTOLIC BLOOD PRESSURE: 80 MMHG | SYSTOLIC BLOOD PRESSURE: 142 MMHG | WEIGHT: 144.8 LBS | HEIGHT: 61 IN | RESPIRATION RATE: 18 BRPM | HEART RATE: 83 BPM | TEMPERATURE: 98.4 F

## 2024-01-12 DIAGNOSIS — E11.42 DIABETIC PERIPHERAL NEUROPATHY: ICD-10-CM

## 2024-01-12 DIAGNOSIS — E66.3 OVERWEIGHT (BMI 25.0-29.9): ICD-10-CM

## 2024-01-12 DIAGNOSIS — D05.11 DUCTAL CARCINOMA IN SITU OF RIGHT BREAST: ICD-10-CM

## 2024-01-12 DIAGNOSIS — M96.843 POSTPROCEDURAL SEROMA OF A MUSCULOSKELETAL STRUCTURE FOLLOWING OTHER PROCEDURE: ICD-10-CM

## 2024-01-12 DIAGNOSIS — I10 PRIMARY HYPERTENSION: Primary | ICD-10-CM

## 2024-01-12 RX ORDER — DILTIAZEM HYDROCHLORIDE 120 MG/1
120 CAPSULE, EXTENDED RELEASE ORAL DAILY
COMMUNITY

## 2024-01-12 RX ORDER — BENAZEPRIL HYDROCHLORIDE 10 MG/1
10 TABLET ORAL DAILY
Qty: 90 TABLET | Refills: 3 | Status: SHIPPED | OUTPATIENT
Start: 2024-01-12

## 2024-02-07 ENCOUNTER — TELEPHONE (OUTPATIENT)
Dept: ENDOCRINOLOGY | Facility: CLINIC | Age: 74
End: 2024-02-07
Payer: MEDICARE

## 2024-02-21 DIAGNOSIS — I10 PRIMARY HYPERTENSION: ICD-10-CM

## 2024-02-21 RX ORDER — BENAZEPRIL HYDROCHLORIDE 5 MG/1
5 TABLET ORAL DAILY
Qty: 90 TABLET | Refills: 3 | OUTPATIENT
Start: 2024-02-21

## 2024-02-23 NOTE — PROGRESS NOTES
HEMATOLOGY ONCOLOGY OUTPATIENT CONSULTATION       Patient name: Gi Velez  : 1950  MRN: [id number]  Primary Care Physician: Mercedez Guevara MD  Referring Physician: Jose Sellers MD  Reason For Consult: ITP, B12 low, anemia, DCIS      History of Present Illness:  Patient is a 73 y.o. female with history of immune thrombocytopenia, B12 deficiency, anemia, DCIS.    2009 patient had a initial bone marrow biopsy which showed normocellular bone marrow with low iron stores, she had a low B12 level at 289, at that point her hemoglobin was 13, platelet count was 91,000  She has been on supplementation with B12 orally    2024 WBC 7.18, hemoglobin 14.5, hematocrit 43.9, MCV 79.4, platelet count 90,    Patient also has a history of DCIS  2023 stereotactic biopsy of the right breast with pathology revealing hide grade ductal carcinoma in situ, ER/OK positive  2023 patient had breast lumpectomy final path with residual DCIS, completely excised  2023 patient completed radiation treatment 16 fractions of 266 cGy per fraction  2023 patient started on anastrozole 1 mg daily along with calcium and vitamin D 600 mg daily    She had side effects with arthralgia which is improving slowly    Subjective:  Patient presents for initial consultation.  Continues to be on Arimidex tolerating well except for mild arthralgias as above    Past Medical History:   Diagnosis Date    Allergies     Diabetes mellitus     diagnosed in the     Dry skin     feet    Hyperlipidemia 2000    Hypertension 2018    Insomnia     Irregular heart beat 2005    rare episode  no need to follow with cardiology    Left supracondylar humerus fracture, closed, initial encounter     Lymphocytosis 2009    Neuropathy     diagnosed in the     Sleep apnea     no machine    Thrombocytopenia 2009    low normal current    Type 2 diabetes mellitus 1998       Past Surgical History:    Procedure Laterality Date    BREAST LUMPECTOMY Right 5/25/2023    Procedure: WIRE LOCALIZED RIGHT LUMPECTOMY;  Surgeon: Joe Ruiz MD;  Location[facility] MAIN OR;  Service: General;  Laterality: Right;    COLONOSCOPY W/ BIOPSIES  07/2021    HYSTERECTOMY      in the 1980's-increased bleeding  bilateral oophorectomy    SHOULDER SURGERY Left 10/03/2022    and arm    TUBAL ABDOMINAL LIGATION      WISDOM TOOTH EXTRACTION           Current Outpatient Medications:     anastrozole (ARIMIDEX) 1 MG tablet, Take 1 tablet by mouth Daily., Disp: , Rfl:     atorvastatin (LIPITOR) 40 MG tablet, TAKE 1 TABLET BY MOUTH EVERY DAY (Patient taking differently: Take 1 tablet by mouth Every Night.), Disp: 90 tablet, Rfl: 3    benazepril (LOTENSIN) 10 MG tablet, Take 1 tablet by mouth Daily., Disp: 90 tablet, Rfl: 3    BIOTIN PO, Take 1 dose by mouth Every Evening. Ld 5/18, Disp: , Rfl:     Calcium Carbonate-Vit D-Min (CALCIUM 1200 PO), Take 1 capsule by mouth Daily., Disp: , Rfl:     Cholecalciferol 1000 units capsule, Take 1 capsule by mouth Every Evening. Ld 5/18, Disp: , Rfl:     CINNAMON PO, Take 1,500 mg by mouth Every Night. Ld 5/18, Disp: , Rfl:     clotrimazole-betamethasone (LOTRISONE) 1-0.05 % cream, Apply 1 Application topically to the appropriate area as directed As Needed., Disp: , Rfl:     Cyanocobalamin (B-12 PO), Take 1,000 mcg by mouth Every Evening. Ld 5/18, Disp: , Rfl:     dilTIAZem SR (CARDIZEM SR) 120 MG 12 hr capsule, Take 1 capsule by mouth Daily., Disp: , Rfl:     docusate sodium (COLACE) 100 MG capsule, Take 1 capsule by mouth Daily As Needed. None dos, Disp: , Rfl:     glimepiride (AMARYL) 4 MG tablet, TAKE 2 TABLETS BY MOUTH EVERY MORNING AT BREAKFAST., Disp: 180 tablet, Rfl: 3    insulin aspart (NovoLOG FlexPen) 100 UNIT/ML solution pen-injector sc pen, Inject 6 units before breakfast & lunch. Take extra 2 units if bl sugar >200.. MDD 30 units, Disp: 15 mL, Rfl: 3    Insulin Pen Needle (1st  Tier Unifine Pentips) 32G X 4 MM misc, Inject 1 Piece under the skin into the appropriate area as directed Every Night., Disp: 400 each, Rfl: 3    Insulin Pen Needle (NovoFine Plus) 32G X 4 MM misc, Inject 1 Piece under the skin into the appropriate area as directed Every Night., Disp: 100 each, Rfl: 3    Omega-3 Fatty Acids (FISH OIL PO), Take 1,200 mg by mouth Every Evening. Ld 5/18, Disp: , Rfl:     ONE TOUCH ULTRA TEST test strip, 1 each by Other route Daily., Disp: , Rfl: 5    pregabalin (LYRICA) 150 MG capsule, Take 1 capsule by mouth 2 (Two) Times a Day., Disp: 180 capsule, Rfl: 1    Tresiba FlexTouch 200 UNIT/ML solution pen-injector pen injection, Inject 20 units daily., Disp: , Rfl:     ELDERBERRY PO, Take 2 capsules by mouth Daily., Disp: , Rfl:     Allergies   Allergen Reactions    Adhesive Tape Other (See Comments)     Blisters      Bee Venom Hives    Topamax [Topiramate] Hives       Family History   Adopted: Yes   Problem Relation Age of Onset    No Known Problems Other        Cancer-related family history is not on file. She was adopted.      Social History     Tobacco Use    Smoking status: Never     Passive exposure: Never    Smokeless tobacco: Never   Vaping Use    Vaping Use: Never used   Substance Use Topics    Alcohol use: Yes     Comment: rare occasion    Drug use: Never     Social History     Social History Narrative    Not on file       ROS:   Review of Systems   Constitutional:  Positive for fatigue. Negative for fever.   HENT:  Negative for congestion and nosebleeds.    Eyes:  Negative for pain.   Respiratory:  Negative for cough and shortness of breath.    Cardiovascular:  Negative for chest pain.   Gastrointestinal:  Negative for abdominal pain, blood in stool, diarrhea, nausea and vomiting.   Endocrine: Negative for cold intolerance and heat intolerance.   Genitourinary:  Negative for difficulty urinating.   Musculoskeletal:  Negative for arthralgias.   Skin:  Negative for rash.  "  Neurological:  Negative for dizziness and headaches.   Hematological:  Does not bruise/bleed easily.   Psychiatric/Behavioral:  Negative for behavioral problems.          Objective:    Vital Signs:  Vitals:    02/27/24 0834   BP: 146/61   Pulse: 94   Resp: 18   Temp: 97.7 °F (36.5 °C)   SpO2: 97%   Weight: 65.9 kg (145 lb 3.2 oz)   Height: 157.5 cm (62\")   PainSc: 0-No pain     Body mass index is 26.56 kg/m².    ECOG  (0) Fully active, able to carry on all predisease performance without restriction    Physical Exam:   Physical Exam  Constitutional:       Appearance: Normal appearance.   HENT:      Head: Normocephalic and atraumatic.   Eyes:      Pupils: Pupils are equal, round, and reactive to light.   Cardiovascular:      Rate and Rhythm: Normal rate and regular rhythm.      Pulses: Normal pulses.      Heart sounds: No murmur heard.  Pulmonary:      Effort: Pulmonary effort is normal.      Breath sounds: Normal breath sounds.   Abdominal:      General: There is no distension.      Palpations: Abdomen is soft. There is no mass.      Tenderness: There is no abdominal tenderness.   Musculoskeletal:         General: Normal range of motion.      Cervical back: Normal range of motion and neck supple.   Skin:     General: Skin is warm.   Neurological:      General: No focal deficit present.      Mental Status: She is alert.   Psychiatric:         Mood and Affect: Mood normal.         Lab Results - Last 18 Months   Lab Units 02/27/24  0826 05/15/23  0836 10/01/22  0404   WBC 10*3/mm3 7.18 7.30 7.90   HEMOGLOBIN g/dL 14.5 14.0 11.6*   HEMATOCRIT % 43.9 42.9 37.3   PLATELETS 10*3/mm3 90* 126* 91*   MCV fL 79.4 80.5 88.1     Lab Results - Last 18 Months   Lab Units 10/10/23  0745 05/15/23  0836 02/27/23  0737   SODIUM mmol/L 142 141 145   POTASSIUM mmol/L 4.4 4.3 4.1   CHLORIDE mmol/L 104 103 107   CO2 mmol/L 26.3 23.1 28.0   BUN mg/dL 15 21 12   CREATININE mg/dL 0.99 0.96 1.02*   CALCIUM mg/dL 10.1 9.9 9.7   BILIRUBIN " "mg/dL 0.3 0.3 0.4   ALK PHOS U/L 94 99 96   ALT (SGPT) U/L 20 21 33   AST (SGOT) U/L 22 16 25   GLUCOSE mg/dL 138* 103* 85       Lab Results   Component Value Date    GLUCOSE 138 (H) 10/10/2023    BUN 15 10/10/2023    CREATININE 0.99 10/10/2023    EGFRIFNONA 56 (L) 02/07/2022    BCR 15.2 10/10/2023    K 4.4 10/10/2023    CO2 26.3 10/10/2023    CALCIUM 10.1 10/10/2023    ALBUMIN 4.4 10/10/2023    LABIL2 1.4 07/05/2018    AST 22 10/10/2023    ALT 20 10/10/2023       Lab Results - Last 18 Months   Lab Units 05/25/23  0733 09/29/22  1933   INR  0.97 1.06   APTT seconds 25.4 26.2*       Lab Results   Component Value Date    IRON 117 04/04/2017    TIBC 504 (H) 04/04/2017    FERRITIN 46 11/02/2017       Lab Results   Component Value Date    FOLATE >24.8 (H) 04/04/2017       No results found for: \"OCCULTBLD\"    No results found for: \"RETICCTPCT\"  Lab Results   Component Value Date    YNHJVCJX13 >2,000 (H) 09/30/2022     No results found for: \"SPEP\", \"UPEP\"  No results found for: \"LDH\", \"URICACID\"  Lab Results   Component Value Date    ALESSANDRO  04/04/2017     NEGATIVE This result is designed to aid in the diagnosis of many of the    ALESSANDRO  04/04/2017     systemic autoimmune disorders and is not diagnostic by itself.  Test results    ALESSANDRO  04/04/2017     should be interpreted in conjunction with the clinical evaluation of the    ALESSANDRO  04/04/2017     patient.  SLE patients undergoing steroid therapy may have negative results.     No results found for: \"FIBRINOGEN\", \"HAPTOGLOBIN\"  Lab Results   Component Value Date    PTT 25.4 05/25/2023    INR 0.97 05/25/2023     No results found for: \"\"  No results found for: \"CEA\"  No components found for: \"CA-19-9\"  No results found for: \"PSA\"  No results found for: \"SEDRATE\"       Assessment & Plan     Patient is a 73-year-old female with DCIS, immune thrombocytopenia, vitamin B12 deficiency    DCIS  ER/IA positive, status post multimodality therapy  Currently on hormone blockade with " anastrozole  Continue same tolerating well  Repeat labs with CBC CMP in 4 months and follow-up    Immune thrombocytopenia  Platelet count has been stable for many years no bleeding concerns  Will monitor platelet count repeat CBC as above in 4 months no indications for treatment as of now  Can consider steroids if platelet count drops less than 30,000    Vitamin B12 deficiency  Continue oral B12  Repeat labs on follow-up        Thank you very much for providing the opportunity to participate in this patient’s care. Please do not hesitate to call if there are any other questions.

## 2024-02-27 ENCOUNTER — APPOINTMENT (OUTPATIENT)
Dept: LAB | Facility: HOSPITAL | Age: 74
End: 2024-02-27
Payer: MEDICARE

## 2024-02-27 ENCOUNTER — CONSULT (OUTPATIENT)
Dept: ONCOLOGY | Facility: CLINIC | Age: 74
End: 2024-02-27
Payer: MEDICARE

## 2024-02-27 VITALS
TEMPERATURE: 97.7 F | SYSTOLIC BLOOD PRESSURE: 146 MMHG | DIASTOLIC BLOOD PRESSURE: 61 MMHG | HEART RATE: 94 BPM | BODY MASS INDEX: 26.72 KG/M2 | HEIGHT: 62 IN | WEIGHT: 145.2 LBS | OXYGEN SATURATION: 97 % | RESPIRATION RATE: 18 BRPM

## 2024-02-27 DIAGNOSIS — D69.6 THROMBOCYTOPENIA: Primary | ICD-10-CM

## 2024-02-27 LAB
BASOPHILS # BLD AUTO: 0.04 10*3/MM3 (ref 0–0.2)
BASOPHILS NFR BLD AUTO: 0.6 % (ref 0–1.5)
DEPRECATED RDW RBC AUTO: 50.8 FL (ref 37–54)
EOSINOPHIL # BLD AUTO: 0.27 10*3/MM3 (ref 0–0.4)
EOSINOPHIL NFR BLD AUTO: 3.8 % (ref 0.3–6.2)
ERYTHROCYTE [DISTWIDTH] IN BLOOD BY AUTOMATED COUNT: 18.5 % (ref 12.3–15.4)
HCT VFR BLD AUTO: 43.9 % (ref 34–46.6)
HGB BLD-MCNC: 14.5 G/DL (ref 12–15.9)
LYMPHOCYTES # BLD AUTO: 2.65 10*3/MM3 (ref 0.7–3.1)
LYMPHOCYTES NFR BLD AUTO: 36.9 % (ref 19.6–45.3)
MCH RBC QN AUTO: 26.2 PG (ref 26.6–33)
MCHC RBC AUTO-ENTMCNC: 33 G/DL (ref 31.5–35.7)
MCV RBC AUTO: 79.4 FL (ref 79–97)
MONOCYTES # BLD AUTO: 0.68 10*3/MM3 (ref 0.1–0.9)
MONOCYTES NFR BLD AUTO: 9.5 % (ref 5–12)
NEUTROPHILS NFR BLD AUTO: 3.54 10*3/MM3 (ref 1.7–7)
NEUTROPHILS NFR BLD AUTO: 49.2 % (ref 42.7–76)
PLATELET # BLD AUTO: 90 10*3/MM3 (ref 140–450)
RBC # BLD AUTO: 5.53 10*6/MM3 (ref 3.77–5.28)
WBC NRBC COR # BLD AUTO: 7.18 10*3/MM3 (ref 3.4–10.8)

## 2024-02-27 PROCEDURE — 36415 COLL VENOUS BLD VENIPUNCTURE: CPT | Performed by: INTERNAL MEDICINE

## 2024-02-27 PROCEDURE — 85025 COMPLETE CBC W/AUTO DIFF WBC: CPT | Performed by: INTERNAL MEDICINE

## 2024-03-21 ENCOUNTER — PATIENT OUTREACH (OUTPATIENT)
Dept: CASE MANAGEMENT | Facility: CLINIC | Age: 74
End: 2024-03-21
Payer: MEDICARE

## 2024-03-21 DIAGNOSIS — I10 PRIMARY HYPERTENSION: ICD-10-CM

## 2024-03-21 DIAGNOSIS — E11.42 TYPE 2 DIABETES MELLITUS WITH DIABETIC POLYNEUROPATHY, WITH LONG-TERM CURRENT USE OF INSULIN: Primary | ICD-10-CM

## 2024-03-21 DIAGNOSIS — Z79.4 TYPE 2 DIABETES MELLITUS WITH DIABETIC POLYNEUROPATHY, WITH LONG-TERM CURRENT USE OF INSULIN: Primary | ICD-10-CM

## 2024-03-21 NOTE — OUTREACH NOTE
AMBULATORY CASE MANAGEMENT NOTE    Name and Relationship of Patient/Support Person: Gi Velez H - Self    CCM Interim Update    Spoke with patient at this time for ACO prospective outreach, identified self and role.  Chart review completed by this ACM, note that patient has had some back and forth with her BP control, and also had an A1C of 9.70 upon last check in October 2023.  Offered assistance with chronic disease management through CCM, specifically BP and DM management, patient declines.    Discussed DM management.  Patient states she knows what she should and shouldn't eat as she is a retired nurse and has also seen a diabetic educator in the past.  She knows she needs to do better with her diet and says she is currently working on it.  She states her daughter lives next door during the summertime and this helps her stay on top of her eating.  She also states she is trying to be more active now that it is warming up and is looking to buy a 3-patterson bicycle to ride.  She hopes to exercise more.  She reports that her Endocrinologist adjusted her insulin at her last office visit and states that her BG have been much better since.  She has a CGM and she says she usually needs to give herself insulin 1-2 times a day.  She denies needing assistance with her DM management at this time.    Discussed BP management, patient states her BP has been controlled at home but tends to be higher when she sees her doctors.  She reports that PCP increased her Benazepril dose to 10mg in January and has seen an improvement in her BP.  She gave the following readings:    DATE    BP  2/29 113/55   3/7 103/48   3/14 112/49   3/21 106/51     She states that though these numbers have been a little on the lower side she has remained asymptomatic.  She reports she is scheduled for a BP recheck with PCP on 4/12.  Advised her to bring BP log with her to PCP appt.  She denies needing any assistance with her BP management at this  time.    Discussed upcoming appts with Ortho, PCP, Radiation Oncology, Cardiology, Endocrinology, and her upcoming Mammogram.  Patient denies any other needs.  Advised her to contact PCP office if she has any questions or concerns, and if she needs any other assistance.  No other needs noted, will close program.        Education Documentation  Unresolved/Worsening Symptoms, taught by Zena Brian, RN at 3/21/2024 11:59 AM.  Learner: Patient  Readiness: Acceptance  Method: Explanation  Response: Verbalizes Understanding    Provider Follow-Up, taught by Zena Brian, RN at 3/21/2024 11:59 AM.  Learner: Patient  Readiness: Acceptance  Method: Explanation  Response: Verbalizes Understanding    Medication Management, taught by Zena Brian, RN at 3/21/2024 11:59 AM.  Learner: Patient  Readiness: Acceptance  Method: Explanation  Response: Verbalizes Understanding    Blood Pressure Monitoring, taught by Zena Brian, RN at 3/21/2024 11:59 AM.  Learner: Patient  Readiness: Acceptance  Method: Explanation  Response: Verbalizes Understanding          Zena JAIN  Ambulatory Case Management    3/21/2024, 11:59 EDT

## 2024-03-28 NOTE — PROGRESS NOTES
[facility] RADIATION ONCOLOGY  FOLLOW-UP NOTE    NAME: Gi Velez  YOB: 1950  MRN #: [id number]  DATE OF SERVICE: 3/29/2024  REFERRING PROVIDER: Mercedez Guevara*   PRIMARY CARE PROVIDER: Mercedez Guevara MD    CHIEF COMPLAINT:  4M F/U    DIAGNOSIS:    Encounter Diagnosis   Name Primary?    Breast neoplasm, Tis (DCIS), right Yes      RADIATION TREATMENT COURSE:    07/03/2023- 07/25/2023: 4256 cGy in 16 fractions to whole right breast.    INTERVAL HISTORY     Gi Velez is a 73 y.o. female who was last seen in our office on 08/29/2023 by Dr. Joe Sow. The plan was to continue Arimidex with Dr. Sellers, continue moisturizing and start vitamin E oil based massages, and follow up here in 6 months.    She was following with Dr. Sellers, but has transferred care to Dr. Oquendo, and was last seen on 02/27/2024 for consultation. Their plan is to continue Arimidex, and follow up in 4 months with repeat labs on 06/11/2024.    IMAGING     Date of Exam: 3/29/2024 7:55 AM  BILATERAL SCREENING MAMMOGRAM     Indication: Screening. History of right breast cancer status post right  lumpectomy in 2023. Surgical margins were negative. This is her new  postoperative baseline.     Comparison: 3/28/2023, 3/7/2022.     Technique: MLO and CC views of bilateral breast(s) were obtained  according to routine screening breast mammography protocol with  tomosynthesis.       The mammographic images were interpreted with the assistance of a  computer aided detection system.     FINDINGS:  The breasts are heterogeneously dense, which may obscure small masses.     New post-surgical changes in the right breast. There are no suspicious  masses or suspicious calcifications in either breast.     IMPRESSION:  No mammographic signs of malignancy. Recommend routine mammographic  screening.     BI-RADS ASSESSMENT: BI-RADS 2. Benign findings.    PATHOLOGY     No recent pathology to review.    LABS      HEMATOLOGY:  WBC   Date Value Ref Range Status   02/27/2024 7.18 3.40 - 10.80 10*3/mm3 Final     RBC   Date Value Ref Range Status   02/27/2024 5.53 (H) 3.77 - 5.28 10*6/mm3 Final     Hemoglobin   Date Value Ref Range Status   02/27/2024 14.5 12.0 - 15.9 g/dL Final     Hematocrit   Date Value Ref Range Status   02/27/2024 43.9 34.0 - 46.6 % Final     Platelets   Date Value Ref Range Status   02/27/2024 90 (L) 140 - 450 10*3/mm3 Final     CHEMISTRY:  Lab Results   Component Value Date    GLUCOSE 138 (H) 10/10/2023    BUN 15 10/10/2023    CREATININE 0.99 10/10/2023    EGFRIFNONA 56 (L) 02/07/2022    BCR 15.2 10/10/2023    K 4.4 10/10/2023    CO2 26.3 10/10/2023    CALCIUM 10.1 10/10/2023    ALBUMIN 4.4 10/10/2023    LABIL2 1.4 07/05/2018    AST 22 10/10/2023    ALT 20 10/10/2023     PROBLEM LIST     Patient Active Problem List   Diagnosis    Thrombocytopenia    Acute ITP    Vitamin B12 deficiency    Iron deficiency    Vitamin D deficiency    Obstructive sleep apnea    Hyperlipidemia    Goiter    Encounter for long-term (current) use of other medications    Diabetic peripheral neuropathy    Type 2 diabetes mellitus with diabetic polyneuropathy, with long-term current use of insulin    Nuclear cataract    Glaucoma suspect    Epiretinal membrane    Chronic fatigue    Insomnia    Overweight (BMI 25.0-29.9)    Initial Medicare annual wellness visit    Need for pneumococcal vaccination    Need for influenza vaccination    Closed fracture of left proximal humerus    Breast neoplasm, Tis (DCIS), right    Primary hypertension    Tachycardia    Medicare annual wellness visit, subsequent    History of incision and drainage    Wound drainage      CURRENT MEDICATIONS     Current Outpatient Medications   Medication Instructions    anastrozole (ARIMIDEX) 1 mg, Oral, Daily    atorvastatin (LIPITOR) 40 MG tablet TAKE 1 TABLET BY MOUTH EVERY DAY    benazepril (LOTENSIN) 10 mg, Oral, Daily    BIOTIN PO Take 1 dose by mouth Every  Evening. Ld 5/18    Calcium Carbonate-Vit D-Min (CALCIUM 1200 PO) 1 capsule, Oral, Daily    Cholecalciferol 1000 units capsule Take 1 capsule by mouth Every Evening. Ld 5/18    CINNAMON PO Take 1,500 mg by mouth Every Night. Ld 5/18    clotrimazole-betamethasone (LOTRISONE) 1-0.05 % cream 1 application , Topical, As Needed    Cyanocobalamin (B-12 PO) Take 1,000 mcg by mouth Every Evening. Ld 5/18    dilTIAZem SR (CARDIZEM SR) 120 mg, Oral, Daily    docusate sodium (COLACE) 100 mg, Oral, Daily PRN, None dos    ELDERBERRY PO 2 capsules, Oral, Daily    glimepiride (AMARYL) 4 MG tablet TAKE 2 TABLETS BY MOUTH EVERY MORNING AT BREAKFAST.    insulin aspart (NovoLOG FlexPen) 100 UNIT/ML solution pen-injector sc pen Inject 6 units before breakfast & lunch. Take extra 2 units if bl sugar >200.. MDD 30 units    Insulin Pen Needle (1st Tier Unifine Pentips) 32G X 4 MM misc 1 Piece, Subcutaneous, Nightly    Insulin Pen Needle (NovoFine Plus) 32G X 4 MM misc 1 Piece, Subcutaneous, Nightly    Omega-3 Fatty Acids (FISH OIL PO) Take 1,200 mg by mouth Every Evening. Ld 5/18    ONE TOUCH ULTRA TEST test strip 1 each by Other route Daily.    pregabalin (LYRICA) 150 mg, Oral, 2 Times Daily    Tresiba FlexTouch 200 UNIT/ML solution pen-injector pen injection Inject 20 units daily.      ALLERGIES     Allergies   Allergen Reactions    Adhesive Tape Other (See Comments)     Blisters      Bee Venom Hives    Topamax [Topiramate] Hives       REVIEW OF SYSTEMS     Review of Systems   Constitutional:  Negative for activity change, appetite change, fatigue and unexpected weight change.   Respiratory:  Negative for cough and shortness of breath.    Cardiovascular:  Negative for chest pain.   Gastrointestinal:  Negative for abdominal pain, blood in stool, constipation, diarrhea and nausea.   Genitourinary:  Negative for difficulty urinating, dysuria, frequency, hematuria and urgency.   Musculoskeletal:  Negative for arthralgias, back pain, gait  problem and joint swelling.   Skin:  Negative for color change.   Neurological:  Negative for dizziness, weakness and light-headedness.   Hematological:  Negative for adenopathy.   Psychiatric/Behavioral:  Negative for confusion.         Vitals:    03/29/24 0959   BP: 168/72   Pulse: 76   Resp: 18   Temp: 98 °F (36.7 °C)   SpO2: 98%      Physical Exam  Exam conducted with a chaperone present.   Constitutional:       General: She is not in acute distress.  HENT:      Head: Normocephalic and atraumatic.   Pulmonary:      Effort: Pulmonary effort is normal. No respiratory distress.   Chest:   Breasts:     Breasts are symmetrical.      Comments: Patient has stiches in her left arm from recent surgery. Otherwise no abnormal findings in either breast or lymph node regions. No palpable mass or abnormality appreciated in bilateral supraclavicular, infraclavicular, or axillary lymph node regions. No palpable mass or abnormality palpated in bilateral breasts outside of lumpectomy cavity which is unchanged.   Neurological:      Mental Status: She is alert and oriented to person, place, and time. Mental status is at baseline.   Psychiatric:         Mood and Affect: Mood normal.         Behavior: Behavior normal.             ECOG:  Restricted in physically strenuous activity but ambulatory and able to carry out work of a light or sedentary nature, e.g., light house work, office work = 1          ASSESSMENT AND PLAN     ASSESSMENT:    Gi Velez with history of right breast DCIS, stage 0, GpnF1O2, ER/SD+, sp lumpectomy and adjuvant radiation radiation completed in July of 2023, 42.56 Gy in 16 fx. She presents for follow-up 8 months after completing adjuvant radiation.       Diagnoses and all orders for this visit:    1. Breast neoplasm, Tis (DCIS), right (Primary)       PLAN:      - Follow-up in our department in 1 year.   - Discussed importance of continued follow-up and imaging with Med Onc.     Patient has no evidence of  disease on todays mammogram. Patient doing well after radiation therapy with no significant toxicity related to treatment. She recently underwent a surgery on her left arm but is healing well. Patient encouraged to reach out with questions or concerns prior to her next appointment.     I spent 30 minutes caring for Gi on this date of service. This time includes time spent by me in the following activities:preparing for the visit, reviewing tests, obtaining and/or reviewing a separately obtained history, performing a medically appropriate examination and/or evaluation , counseling and educating the patient/family/caregiver, documenting information in the medical record, and independently interpreting results and communicating that information with the patient/family/caregiver    FOLLOW UP     No follow-ups on file.     CC: Mercedez Guevara* Mercedez Guevara MD

## 2024-03-29 ENCOUNTER — HOSPITAL ENCOUNTER (OUTPATIENT)
Dept: MAMMOGRAPHY | Facility: HOSPITAL | Age: 74
Discharge: HOME | End: 2024-03-29
Payer: MEDICARE

## 2024-03-29 ENCOUNTER — OFFICE VISIT (OUTPATIENT)
Dept: RADIATION ONCOLOGY | Facility: HOSPITAL | Age: 74
End: 2024-03-29
Payer: MEDICARE

## 2024-03-29 VITALS
WEIGHT: 147.4 LBS | RESPIRATION RATE: 18 BRPM | DIASTOLIC BLOOD PRESSURE: 72 MMHG | HEART RATE: 76 BPM | SYSTOLIC BLOOD PRESSURE: 168 MMHG | HEIGHT: 62 IN | TEMPERATURE: 98 F | OXYGEN SATURATION: 98 % | BODY MASS INDEX: 27.12 KG/M2

## 2024-03-29 DIAGNOSIS — D05.11 BREAST NEOPLASM, TIS (DCIS), RIGHT: Primary | ICD-10-CM

## 2024-03-29 DIAGNOSIS — Z12.31 ENCOUNTER FOR SCREENING MAMMOGRAM FOR BREAST CANCER: ICD-10-CM

## 2024-03-29 PROCEDURE — G0463 HOSPITAL OUTPT CLINIC VISIT: HCPCS | Performed by: INTERNAL MEDICINE

## 2024-03-29 PROCEDURE — 77067 SCR MAMMO BI INCL CAD: CPT

## 2024-03-29 PROCEDURE — 77063 BREAST TOMOSYNTHESIS BI: CPT

## 2024-04-10 NOTE — PROGRESS NOTES
Chief Complaint  Hypertension and Arm Pain    History of Present Illness  Gi Velez presents today for follow up on hypertension and arm pain    Hypertension  Patient does check blood pressure at home.   Home readings range from 115/70 - 135/62 per patient/patient submitted blood pressure diary.  Patient reports  chest pain and dyspnea , patient sees Dr. Noyola for cardiology  Patient reports they are taking medications as prescribed and they are not having side effects.    Arm Pain  Patient reports left arm pain. Onset of this was about a year and a half ago after a fall where she broke the arm. She finds mild relief with tylenol and aleve occasionally.    Patient Care Team:  Mercedez Guevara MD as PCP - General (Family Medicine)  Joe Ruiz MD as Surgeon (General Surgery)  Vic Cerna MD as Consulting Physician (Endocrinology)  Jose Sellers MD as Consulting Physician (Hematology and Oncology)  Monty Nation MD as Consulting Physician (Ophthalmology)  Yonatan Fernandez Jr., DPM as Consulting Physician (Podiatry)  Uriel Montenegro MD as Consulting Physician (Orthopedic Surgery)  Galileo Oquendo MD as Consulting Physician (Hematology and Oncology)  Elias Coles MD as Consulting Physician (Radiation Oncology)   Current Outpatient Medications on File Prior to Visit   Medication Sig    anastrozole (ARIMIDEX) 1 MG tablet Take 1 tablet by mouth Daily.    benazepril (LOTENSIN) 10 MG tablet Take 1 tablet by mouth Daily.    BIOTIN PO Take 1 dose by mouth Every Evening. Ld 5/18    Calcium Carbonate-Vit D-Min (CALCIUM 1200 PO) Take 1 capsule by mouth Daily.    Cholecalciferol 1000 units capsule Take 1 capsule by mouth Every Evening. Ld 5/18    CINNAMON PO Take 1,500 mg by mouth Every Night. Ld 5/18    clotrimazole-betamethasone (LOTRISONE) 1-0.05 % cream Apply 1 Application topically to the appropriate area as directed As Needed.    Cyanocobalamin (B-12 PO) Take 1,000 mcg by mouth  "Every Evening. Ld 5/18    docusate sodium (COLACE) 100 MG capsule Take 1 capsule by mouth Daily As Needed. None dos    doxycycline (VIBRAMYCIN) 100 MG capsule Take 1 capsule by mouth.    ELDERBERRY PO Take 2 capsules by mouth Daily.    glimepiride (AMARYL) 4 MG tablet TAKE 2 TABLETS BY MOUTH EVERY MORNING AT BREAKFAST.    insulin aspart (NovoLOG FlexPen) 100 UNIT/ML solution pen-injector sc pen Inject 6 units before breakfast & lunch. Take extra 2 units if bl sugar >200.. MDD 30 units    Insulin Pen Needle (1st Tier Unifine Pentips) 32G X 4 MM misc Inject 1 Piece under the skin into the appropriate area as directed Every Night.    Insulin Pen Needle (NovoFine Plus) 32G X 4 MM misc Inject 1 Piece under the skin into the appropriate area as directed Every Night.    Omega-3 Fatty Acids (FISH OIL PO) Take 1,200 mg by mouth Every Evening. Ld 5/18    ONE TOUCH ULTRA TEST test strip 1 each by Other route Daily.    Tresiba FlexTouch 200 UNIT/ML solution pen-injector pen injection Inject 20 units daily.    [DISCONTINUED] atorvastatin (LIPITOR) 40 MG tablet TAKE 1 TABLET BY MOUTH EVERY DAY (Patient taking differently: Take 1 tablet by mouth Every Night.)    [DISCONTINUED] dilTIAZem SR (CARDIZEM SR) 120 MG 12 hr capsule Take 1 capsule by mouth Daily.    [DISCONTINUED] pregabalin (LYRICA) 150 MG capsule Take 1 capsule by mouth 2 (Two) Times a Day.    [DISCONTINUED] Tiadylt  MG 24 hr capsule Take 1 capsule by mouth Daily.     No current facility-administered medications on file prior to visit.       Objective   Vital Signs:   /70 (BP Location: Right arm, Patient Position: Sitting, Cuff Size: Large Adult)   Pulse 87   Temp 97.8 °F (36.6 °C) (Temporal)   Resp 18   Ht 154.9 cm (61\")   Wt 66.2 kg (146 lb)   SpO2 98%   BMI 27.59 kg/m²    BP Readings from Last 3 Encounters:   04/12/24 136/70   03/29/24 168/72   02/27/24 146/61     Wt Readings from Last 3 Encounters:   04/12/24 66.2 kg (146 lb)   03/29/24 66.9 kg " (147 lb 6.4 oz)   02/27/24 65.9 kg (145 lb 3.2 oz)         Physical Exam  Vitals and nursing note reviewed.   Constitutional:       General: She is not in acute distress.     Appearance: Normal appearance. She is well-developed.   HENT:      Head: Normocephalic and atraumatic.   Eyes:      General: No scleral icterus.     Conjunctiva/sclera: Conjunctivae normal.   Cardiovascular:      Rate and Rhythm: Normal rate and regular rhythm.      Heart sounds: No murmur heard.  Pulmonary:      Effort: Pulmonary effort is normal.      Breath sounds: Normal breath sounds. No wheezing.   Musculoskeletal:         General: Normal range of motion.      Comments: Left upper arm with long vertical surgical scar from anterior shoulder to a couple centimeters above the elbow.  There is no erythema, increased warmth, or drainage   Skin:     General: Skin is warm and dry.      Findings: No rash.   Neurological:      Mental Status: She is alert and oriented to person, place, and time.   Psychiatric:         Mood and Affect: Mood normal.         Thought Content: Thought content normal.            No visits with results within 1 Day(s) from this visit.   Latest known visit with results is:   Consult on 02/27/2024   Component Date Value Ref Range Status    WBC 02/27/2024 7.18  3.40 - 10.80 10*3/mm3 Final    RBC 02/27/2024 5.53 (H)  3.77 - 5.28 10*6/mm3 Final    Hemoglobin 02/27/2024 14.5  12.0 - 15.9 g/dL Final    Hematocrit 02/27/2024 43.9  34.0 - 46.6 % Final    MCV 02/27/2024 79.4  79.0 - 97.0 fL Final    MCH 02/27/2024 26.2 (L)  26.6 - 33.0 pg Final    MCHC 02/27/2024 33.0  31.5 - 35.7 g/dL Final    RDW 02/27/2024 18.5 (H)  12.3 - 15.4 % Final    RDW-SD 02/27/2024 50.8  37.0 - 54.0 fl Final    Platelets 02/27/2024 90 (L)  140 - 450 10*3/mm3 Final    Neutrophil % 02/27/2024 49.2  42.7 - 76.0 % Final    Lymphocyte % 02/27/2024 36.9  19.6 - 45.3 % Final    Monocyte % 02/27/2024 9.5  5.0 - 12.0 % Final    Eosinophil % 02/27/2024 3.8  0.3 -  6.2 % Final    Basophil % 02/27/2024 0.6  0.0 - 1.5 % Final    Neutrophils, Absolute 02/27/2024 3.54  1.70 - 7.00 10*3/mm3 Final    Lymphocytes, Absolute 02/27/2024 2.65  0.70 - 3.10 10*3/mm3 Final    Monocytes, Absolute 02/27/2024 0.68  0.10 - 0.90 10*3/mm3 Final    Eosinophils, Absolute 02/27/2024 0.27  0.00 - 0.40 10*3/mm3 Final    Basophils, Absolute 02/27/2024 0.04  0.00 - 0.20 10*3/mm3 Final     A1C Last 3 Results          10/10/2023    07:45   HGBA1C Last 3 Results   Hemoglobin A1C 9.70      Lab Results   Component Value Date    CHOL 162 10/10/2023    TRIG 159 (H) 10/10/2023    HDL 32 (L) 10/10/2023     (H) 10/10/2023     Lab Results   Component Value Date    TSH 3.980 10/10/2023     Lab Results   Component Value Date    GLUCOSE 138 (H) 10/10/2023    BUN 15 10/10/2023    CREATININE 0.99 10/10/2023    EGFRIFNONA 56 (L) 02/07/2022    BCR 15.2 10/10/2023    K 4.4 10/10/2023    CO2 26.3 10/10/2023    CALCIUM 10.1 10/10/2023    ALBUMIN 4.4 10/10/2023    LABIL2 1.4 07/05/2018    AST 22 10/10/2023    ALT 20 10/10/2023     Lab Results   Component Value Date    WBC 7.18 02/27/2024    HGB 14.5 02/27/2024    HCT 43.9 02/27/2024    MCV 79.4 02/27/2024    PLT 90 (L) 02/27/2024                      Assessment and Plan    Diagnoses and all orders for this visit:    1. Primary hypertension (Primary)  -     Tiadylt  MG 24 hr capsule; Take 1 capsule by mouth Daily.  Dispense: 90 capsule; Refill: 3    2. Closed fracture of proximal end of left humerus, unspecified fracture morphology, sequela    3. Overweight (BMI 25.0-29.9)    4. Diabetic peripheral neuropathy  -     pregabalin (LYRICA) 150 MG capsule; Take 1 capsule by mouth 2 (Two) Times a Day.  Dispense: 180 capsule; Refill: 1    5. Mixed hyperlipidemia  -     atorvastatin (LIPITOR) 40 MG tablet; Take 1 tablet by mouth Every Night.  Dispense: 90 tablet; Refill: 1      Hypertension upper end of goal, renewing medications as below.    Hyperlipidemia renewing  atorvastatin.  Will check level at least annually, last labs in October at goal.    Patient states they had surgery on 3/12/24 for removal of  plate and screw and implantation of antibiotic bead chain and 5 days left upper extremity with Dr. Montenegro.  bead chain was removed following surgery and she was started on oral antibiotics as a precaution due to diagnosis of diabetes she states the arm is healing well, no continued drainage.    Diabetes managed by endocrinology. Dr Cerna.     History of right breast cancer.  She was following with Dr. Sellers, Mammogram was ordered by Dr Sellers , completed on 3/29/24 but has transferred care to Dr. Oquendo on 02/27/2024.  Plan to continue Arimidex total 5 years (additional 4 years in May) , and follow up with Dr. Oquendo with repeat labs scheduled on 06/11/2024.     Recently had labs for heme-onc through [facility] on March 12, these results have been reviewed.,  WBC, hemoglobin and hematocrit are within normal limits.  Platelets are 94,000.  She will be repeating these labs in May or June    Did caution against use of NSAID's recommend to avoid nonsteroidal anti-inflammatory such as ibuprofen, Motrin, Midol, Advil, naproxen, or Aleve.  May take Tylenol and use topicals such as Voltaren gel, Biofreeze, or lidocaine patches.      Medications Discontinued During This Encounter   Medication Reason    dilTIAZem SR (CARDIZEM SR) 120 MG 12 hr capsule Alternate therapy    atorvastatin (LIPITOR) 40 MG tablet Reorder    pregabalin (LYRICA) 150 MG capsule Reorder    Tiadylt  MG 24 hr capsule Reorder         Follow Up     Return in about 3 months (around 7/12/2024) for Recheck, htn.    Patient was given instructions and counseling regarding her condition or for health maintenance advice. Please see specific information pulled into the AVS if appropriate.

## 2024-04-12 ENCOUNTER — OFFICE VISIT (OUTPATIENT)
Dept: FAMILY MEDICINE CLINIC | Facility: CLINIC | Age: 74
End: 2024-04-12
Payer: MEDICARE

## 2024-04-12 ENCOUNTER — TELEPHONE (OUTPATIENT)
Dept: FAMILY MEDICINE CLINIC | Facility: CLINIC | Age: 74
End: 2024-04-12

## 2024-04-12 VITALS
RESPIRATION RATE: 18 BRPM | TEMPERATURE: 97.8 F | DIASTOLIC BLOOD PRESSURE: 70 MMHG | HEIGHT: 61 IN | WEIGHT: 146 LBS | SYSTOLIC BLOOD PRESSURE: 136 MMHG | HEART RATE: 87 BPM | BODY MASS INDEX: 27.56 KG/M2 | OXYGEN SATURATION: 98 %

## 2024-04-12 DIAGNOSIS — S42.202S CLOSED FRACTURE OF PROXIMAL END OF LEFT HUMERUS, UNSPECIFIED FRACTURE MORPHOLOGY, SEQUELA: ICD-10-CM

## 2024-04-12 DIAGNOSIS — I10 PRIMARY HYPERTENSION: ICD-10-CM

## 2024-04-12 DIAGNOSIS — E78.2 MIXED HYPERLIPIDEMIA: ICD-10-CM

## 2024-04-12 DIAGNOSIS — E11.42 DIABETIC PERIPHERAL NEUROPATHY: ICD-10-CM

## 2024-04-12 DIAGNOSIS — I10 PRIMARY HYPERTENSION: Primary | ICD-10-CM

## 2024-04-12 DIAGNOSIS — E66.3 OVERWEIGHT (BMI 25.0-29.9): ICD-10-CM

## 2024-04-12 RX ORDER — DILTIAZEM HYDROCHLORIDE 120 MG/1
1 CAPSULE, EXTENDED RELEASE ORAL DAILY
COMMUNITY
Start: 2024-01-24 | End: 2024-04-12 | Stop reason: SDUPTHER

## 2024-04-12 RX ORDER — DOXYCYCLINE HYCLATE 100 MG/1
100 CAPSULE ORAL
COMMUNITY
Start: 2024-04-02 | End: 2024-04-12

## 2024-04-12 RX ORDER — ATORVASTATIN CALCIUM 40 MG/1
40 TABLET, FILM COATED ORAL NIGHTLY
Qty: 90 TABLET | Refills: 1 | Status: SHIPPED | OUTPATIENT
Start: 2024-04-12

## 2024-04-12 RX ORDER — DILTIAZEM HYDROCHLORIDE 120 MG/1
120 CAPSULE, EXTENDED RELEASE ORAL DAILY
Qty: 90 CAPSULE | Refills: 3 | Status: SHIPPED | OUTPATIENT
Start: 2024-04-12

## 2024-04-12 RX ORDER — BENAZEPRIL HYDROCHLORIDE 5 MG/1
5 TABLET ORAL DAILY
Qty: 90 TABLET | Refills: 3 | OUTPATIENT
Start: 2024-04-12

## 2024-04-12 RX ORDER — PREGABALIN 150 MG/1
150 CAPSULE ORAL 2 TIMES DAILY
Qty: 180 CAPSULE | Refills: 1 | Status: SHIPPED | OUTPATIENT
Start: 2024-04-12

## 2024-04-12 NOTE — TELEPHONE ENCOUNTER
Dr. Oquendo  Mrs Velez has recently established with you, on 02/27/2024. She has history of right breast cancer.  She was following with Dr. Sellers, Mammogram was ordered by Dr Sellers before he left his practice, completed on 3/29/24.  I have shared with her the result, BI-RADS 2.  She was somewhat anxious that she had not heard the results.  Just wanted to make sure you could see a copy.  Mercedez Guevara MD

## 2024-04-25 DIAGNOSIS — I10 PRIMARY HYPERTENSION: ICD-10-CM

## 2024-04-25 RX ORDER — DILTIAZEM HYDROCHLORIDE 120 MG/1
120 CAPSULE, EXTENDED RELEASE ORAL DAILY
Qty: 90 CAPSULE | Refills: 3 | OUTPATIENT
Start: 2024-04-25

## 2024-04-28 DIAGNOSIS — I10 PRIMARY HYPERTENSION: ICD-10-CM

## 2024-04-29 ENCOUNTER — OFFICE VISIT (OUTPATIENT)
Dept: CARDIOLOGY | Facility: CLINIC | Age: 74
End: 2024-04-29
Payer: MEDICARE

## 2024-04-29 VITALS
OXYGEN SATURATION: 98 % | HEIGHT: 61 IN | BODY MASS INDEX: 27.38 KG/M2 | SYSTOLIC BLOOD PRESSURE: 137 MMHG | WEIGHT: 145 LBS | HEART RATE: 69 BPM | DIASTOLIC BLOOD PRESSURE: 56 MMHG

## 2024-04-29 DIAGNOSIS — E11.9 TYPE 2 DIABETES MELLITUS WITHOUT COMPLICATION, WITHOUT LONG-TERM CURRENT USE OF INSULIN: ICD-10-CM

## 2024-04-29 DIAGNOSIS — I10 PRIMARY HYPERTENSION: Primary | ICD-10-CM

## 2024-04-29 DIAGNOSIS — G47.33 OBSTRUCTIVE SLEEP APNEA: ICD-10-CM

## 2024-04-29 DIAGNOSIS — E78.00 PURE HYPERCHOLESTEROLEMIA: ICD-10-CM

## 2024-04-29 PROCEDURE — 1159F MED LIST DOCD IN RCRD: CPT | Performed by: INTERNAL MEDICINE

## 2024-04-29 PROCEDURE — 3075F SYST BP GE 130 - 139MM HG: CPT | Performed by: INTERNAL MEDICINE

## 2024-04-29 PROCEDURE — 1160F RVW MEDS BY RX/DR IN RCRD: CPT | Performed by: INTERNAL MEDICINE

## 2024-04-29 PROCEDURE — 99213 OFFICE O/P EST LOW 20 MIN: CPT | Performed by: INTERNAL MEDICINE

## 2024-04-29 PROCEDURE — 3078F DIAST BP <80 MM HG: CPT | Performed by: INTERNAL MEDICINE

## 2024-04-29 RX ORDER — BENAZEPRIL HYDROCHLORIDE 5 MG/1
5 TABLET ORAL DAILY
Qty: 90 TABLET | Refills: 3 | OUTPATIENT
Start: 2024-04-29

## 2024-04-29 NOTE — PROGRESS NOTES
"    Subjective:     Encounter Date:04/29/2024      Patient ID: Gi Velez is a 74 y.o. female.    Chief Complaint:  History of Present Illness 74-year-old white female with history of diabetes hypertension hyperlipidemia presents to the office for a follow-up.  Patient is currently stable without any symptoms of chest pain or shortness of breath at rest or exertion.  No complaint of any PND orthopnea.  No palpitation dizziness syncope or swelling of the feet.  Patient is taking all her medicines regularly.  Patient does not smoke    The following portions of the patient's history were reviewed and updated as appropriate: allergies, current medications, past family history, past medical history, past social history, past surgical history, and problem list.  Past Medical History:   Diagnosis Date    Allergies     Breast cancer     Diabetes mellitus     diagnosed in the 1990's    Drug therapy     Dry skin     feet    Hx of radiation therapy     Hyperlipidemia 2000    Hypertension 2018    Insomnia     Irregular heart beat 2005    rare episode  no need to follow with cardiology    Left supracondylar humerus fracture, closed, initial encounter     Lymphocytosis 12/2009    Neuropathy     diagnosed in the 1990's    Sleep apnea     no machine    Thrombocytopenia 12/2009    low normal current    Type 2 diabetes mellitus 1998     Past Surgical History:   Procedure Laterality Date    BREAST BIOPSY      BREAST LUMPECTOMY Right 05/25/2023    Procedure: WIRE LOCALIZED RIGHT LUMPECTOMY;  Surgeon: Joe Ruiz MD;  Location: [facility] MAIN OR;  Service: General;  Laterality: Right;    COLONOSCOPY W/ BIOPSIES  07/2021    HYSTERECTOMY      in the 1980's-increased bleeding  bilateral oophorectomy    SHOULDER SURGERY Left 10/03/2022    and arm    TUBAL ABDOMINAL LIGATION      WISDOM TOOTH EXTRACTION       /56   Pulse 69   Ht 154.9 cm (60.98\")   Wt 65.8 kg (145 lb)   SpO2 98%   BMI 27.41 kg/m²   Family History "   Adopted: Yes   Problem Relation Age of Onset    No Known Problems Other        Current Outpatient Medications:     anastrozole (ARIMIDEX) 1 MG tablet, Take 1 tablet by mouth Daily., Disp: , Rfl:     atorvastatin (LIPITOR) 40 MG tablet, Take 1 tablet by mouth Every Night., Disp: 90 tablet, Rfl: 1    benazepril (LOTENSIN) 10 MG tablet, Take 1 tablet by mouth Daily., Disp: 90 tablet, Rfl: 3    BIOTIN PO, Take 1 dose by mouth Every Evening. Ld 5/18, Disp: , Rfl:     Calcium Carbonate-Vit D-Min (CALCIUM 1200 PO), Take 1 capsule by mouth Daily., Disp: , Rfl:     Cholecalciferol 1000 units capsule, Take 1 capsule by mouth Every Evening. Ld 5/18, Disp: , Rfl:     CINNAMON PO, Take 1,500 mg by mouth Every Night. Ld 5/18, Disp: , Rfl:     clotrimazole-betamethasone (LOTRISONE) 1-0.05 % cream, Apply 1 Application topically to the appropriate area as directed As Needed., Disp: , Rfl:     Cyanocobalamin (B-12 PO), Take 1,000 mcg by mouth Every Evening. Ld 5/18, Disp: , Rfl:     docusate sodium (COLACE) 100 MG capsule, Take 1 capsule by mouth Daily As Needed. None dos, Disp: , Rfl:     ELDERBERRY PO, Take 2 capsules by mouth Daily., Disp: , Rfl:     glimepiride (AMARYL) 4 MG tablet, TAKE 2 TABLETS BY MOUTH EVERY MORNING AT BREAKFAST., Disp: 180 tablet, Rfl: 3    insulin aspart (NovoLOG FlexPen) 100 UNIT/ML solution pen-injector sc pen, Inject 6 units before breakfast & lunch. Take extra 2 units if bl sugar >200.. MDD 30 units, Disp: 15 mL, Rfl: 3    Insulin Pen Needle (1st Tier Unifine Pentips) 32G X 4 MM misc, Inject 1 Piece under the skin into the appropriate area as directed Every Night., Disp: 400 each, Rfl: 3    Insulin Pen Needle (NovoFine Plus) 32G X 4 MM misc, Inject 1 Piece under the skin into the appropriate area as directed Every Night., Disp: 100 each, Rfl: 3    Omega-3 Fatty Acids (FISH OIL PO), Take 1,200 mg by mouth Every Evening. Ld 5/18, Disp: , Rfl:     ONE TOUCH ULTRA TEST test strip, 1 each by Other route  Daily., Disp: , Rfl: 5    pregabalin (LYRICA) 150 MG capsule, Take 1 capsule by mouth 2 (Two) Times a Day., Disp: 180 capsule, Rfl: 1    Tiadylt  MG 24 hr capsule, Take 1 capsule by mouth Daily., Disp: 90 capsule, Rfl: 3    Tresiba FlexTouch 200 UNIT/ML solution pen-injector pen injection, Inject 20 units daily., Disp: , Rfl:   Allergies   Allergen Reactions    Adhesive Tape Other (See Comments)     Blisters      Bee Venom Hives    Topamax [Topiramate] Hives     Social History     Socioeconomic History    Marital status:     Number of children: 2    Years of education: 15   Tobacco Use    Smoking status: Never     Passive exposure: Never    Smokeless tobacco: Never   Vaping Use    Vaping status: Never Used   Substance and Sexual Activity    Alcohol use: Yes     Comment: rare occasion    Drug use: Never    Sexual activity: Defer     Review of Systems   Constitutional: Negative for malaise/fatigue.   Cardiovascular:  Negative for chest pain, dyspnea on exertion, leg swelling and palpitations.   Respiratory:  Negative for cough and shortness of breath.    Gastrointestinal:  Negative for abdominal pain, nausea and vomiting.   Neurological:  Negative for dizziness, focal weakness, headaches, light-headedness and numbness.   All other systems reviewed and are negative.             Objective:     Constitutional:       Appearance: Well-developed.   Eyes:      General: No scleral icterus.     Conjunctiva/sclera: Conjunctivae normal.   HENT:      Head: Normocephalic and atraumatic.   Neck:      Vascular: No carotid bruit or JVD.   Pulmonary:      Effort: Pulmonary effort is normal.      Breath sounds: Normal breath sounds. No wheezing. No rales.   Cardiovascular:      Normal rate. Regular rhythm.   Pulses:     Intact distal pulses.   Abdominal:      General: Bowel sounds are normal.      Palpations: Abdomen is soft.   Musculoskeletal:      Cervical back: Normal range of motion and neck supple. Skin:     General:  Skin is warm and dry.      Findings: No rash.   Neurological:      Mental Status: Alert.       Procedures    Lab Review:         MDM    #1 hypertension  Patient blood pressure is currently stable on benazepril  2.  Hyperlipidemia  Patient on atorvastatin the lipid levels are well within normal limits  #3 diabetes  Patient is on insulin as well as oral medicines.    Patient's previous medical records, labs, and EKG were reviewed and discussed with the patient at today's visit.

## 2024-05-02 ENCOUNTER — TELEPHONE (OUTPATIENT)
Dept: FAMILY MEDICINE CLINIC | Facility: CLINIC | Age: 74
End: 2024-05-02
Payer: MEDICARE

## 2024-05-02 NOTE — TELEPHONE ENCOUNTER
Caller: Gi Velez    Relationship: Self    Best call back number: [phone]    What medication are you requesting: SENSOR FOR FREESTYLE ARIC 2    PRESCRIBED BY DR GORMAN BUT WILL NOT BE FILLED UNTIL SOONEST JUNE VISIT    What are your current symptoms: DIABETIC    If a prescription is needed, what is your preferred pharmacy and phone number:  [facility] PHONE # [phone]

## 2024-05-03 ENCOUNTER — TELEPHONE (OUTPATIENT)
Dept: FAMILY MEDICINE CLINIC | Facility: CLINIC | Age: 74
End: 2024-05-03
Payer: MEDICARE

## 2024-05-03 NOTE — TELEPHONE ENCOUNTER
I called and informed the patient that we have not received anything yet. I advised her to call them and have them send it again, she voiced understanding.

## 2024-05-03 NOTE — TELEPHONE ENCOUNTER
Caller: Gi Velez    Relationship: Self    Best call back number: [phone]    What is the best time to reach you: ANYTIME     Who are you requesting to speak with (clinical staff, provider,  specific staff member): CLINICAL     What was the call regarding: PATIENT STATES [facility] FAXED OVER A REQUEST FOR THE MOST RECENT CLINICAL NOTES BEFORE THEY ARE ABLE TO SEND OUT HER GLUCOSE SENSORS.     PATIENT IS REQUESTING A CALL BACK.

## 2024-05-14 ENCOUNTER — TELEPHONE (OUTPATIENT)
Dept: FAMILY MEDICINE CLINIC | Facility: CLINIC | Age: 74
End: 2024-05-14
Payer: MEDICARE

## 2024-05-14 NOTE — TELEPHONE ENCOUNTER
Please send what ever records are requested.  I do need to state that I am confused as to how/why I am involved with prescribing the continuous glucose monitor when her diabetes is managed by and the continuous glucose monitor was ordered by Dr. Cerna.

## 2024-05-14 NOTE — TELEPHONE ENCOUNTER
Caller: Gi Velez    Relationship: Self    Best call back number: [phone]    What form or medical record are you requesting: LAST OFFICE NOTE TO APPROVE FREDO CALHOUN    Who is requesting this form or medical record from you: [facility]    [facility] PHONE # [phone]

## 2024-05-14 NOTE — TELEPHONE ENCOUNTER
I have already routed the encounter to Dr. Guevara to advise before I fax over any office notes. She has not given the okay that she will be willing to prescribe the freestyle julian. Once reviewed and discussed with Dr. Guevara, I will send what is appropriate.

## 2024-05-14 NOTE — TELEPHONE ENCOUNTER
Caller: [facility]    Relationship to patient: Other    Best call back number: [phone]    What form or medical record are you requesting: OFFICE VISIT NOTES FROM 04/12/24     Who is requesting this form or medical record from you: [facility]     How would you like to receive the form or medical records (pick-up, mail, fax): FAX  If fax, what is the fax number: [phone]    Timeframe paperwork needed: ASAP    Additional notes:     [facility] CALLED AND SAID THAT THE PATIENT IS OUT OF GLUCOSE SENSORS AND SAID THAT THEY ARE WAITING ON PROGRESS NOTES FROM DR. MONGE BEFORE THEY CAN GET THOSE PROCESSED FOR HER     THEY NEED ELECTRONICALLY SIGNED OR PHYSICALLY SIGNED PROGRESS NOTES FROM PATIENT'S LAST OFFICE VISIT     [facility]  FAX: [phone]

## 2024-05-14 NOTE — TELEPHONE ENCOUNTER
Lvm with the patient and the MA for Dr. Cerna. Need to know why the patient is unable to obtain refill on their diabetes medical supplies until June when Dr. Cerna managed her diabetes and not Dr. Guevara. Faxing over last office note now per Dr. Guevara okaying this request.

## 2024-05-21 ENCOUNTER — LAB (OUTPATIENT)
Dept: LAB | Facility: HOSPITAL | Age: 74
End: 2024-05-21
Payer: MEDICARE

## 2024-05-21 DIAGNOSIS — Z79.4 TYPE 2 DIABETES MELLITUS WITH DIABETIC POLYNEUROPATHY, WITH LONG-TERM CURRENT USE OF INSULIN: ICD-10-CM

## 2024-05-21 DIAGNOSIS — E11.42 TYPE 2 DIABETES MELLITUS WITH DIABETIC POLYNEUROPATHY, WITH LONG-TERM CURRENT USE OF INSULIN: ICD-10-CM

## 2024-05-21 LAB
ALBUMIN SERPL-MCNC: 4.2 G/DL (ref 3.5–5.2)
ALBUMIN/GLOB SERPL: 1.6 G/DL
ALP SERPL-CCNC: 99 U/L (ref 39–117)
ALT SERPL W P-5'-P-CCNC: 49 U/L (ref 1–33)
ANION GAP SERPL CALCULATED.3IONS-SCNC: 11 MMOL/L (ref 5–15)
AST SERPL-CCNC: 31 U/L (ref 1–32)
BILIRUB SERPL-MCNC: 0.4 MG/DL (ref 0–1.2)
BUN SERPL-MCNC: 24 MG/DL (ref 8–23)
BUN/CREAT SERPL: 19.5 (ref 7–25)
CALCIUM SPEC-SCNC: 9.6 MG/DL (ref 8.6–10.5)
CHLORIDE SERPL-SCNC: 102 MMOL/L (ref 98–107)
CO2 SERPL-SCNC: 25 MMOL/L (ref 22–29)
CREAT SERPL-MCNC: 1.23 MG/DL (ref 0.57–1)
EGFRCR SERPLBLD CKD-EPI 2021: 45.6 ML/MIN/1.73
GLOBULIN UR ELPH-MCNC: 2.6 GM/DL
GLUCOSE SERPL-MCNC: 176 MG/DL (ref 65–99)
HBA1C MFR BLD: 8.54 % (ref 4.8–5.6)
POTASSIUM SERPL-SCNC: 4.5 MMOL/L (ref 3.5–5.2)
PROT SERPL-MCNC: 6.8 G/DL (ref 6–8.5)
SODIUM SERPL-SCNC: 138 MMOL/L (ref 136–145)

## 2024-05-21 PROCEDURE — 36415 COLL VENOUS BLD VENIPUNCTURE: CPT

## 2024-05-21 PROCEDURE — 80053 COMPREHEN METABOLIC PANEL: CPT

## 2024-05-21 PROCEDURE — 83036 HEMOGLOBIN GLYCOSYLATED A1C: CPT

## 2024-06-03 NOTE — PROGRESS NOTES
HEMATOLOGY ONCOLOGY OUTPATIENT FOLLOWUP       Patient name: Gi Velez  : 1950  MRN: [id number]  Primary Care Physician: Mercedez Guevara MD  Referring Physician: No ref. provider found  Reason For Consult: ITP, B12 low, anemia, DCIS      History of Present Illness:  Patient is a 74 y.o. female with history of immune thrombocytopenia, B12 deficiency, anemia, DCIS.    2009 patient had a initial bone marrow biopsy which showed normocellular bone marrow with low iron stores, she had a low B12 level at 289, at that point her hemoglobin was 13, platelet count was 91,000  She has been on supplementation with B12 orally    2024 WBC 7.18, hemoglobin 14.5, hematocrit 43.9, MCV 79.4, platelet count 90,    Patient also has a history of DCIS  2023 stereotactic biopsy of the right breast with pathology revealing hide grade ductal carcinoma in situ, ER/[place] positive  2023 patient had breast lumpectomy final path with residual DCIS, completely excised  2023 patient completed radiation treatment 16 fractions of 266 cGy per fraction  2023 patient started on anastrozole 1 mg daily along with calcium and vitamin D 600 mg daily  23 - DEXA  normal.    3/2024 mammogram benign.    She had side effects with arthralgia which is improving slowly      Subjective:  Continues arimidex, no hot flashes, has mild arthralgias    Past Medical History:   Diagnosis Date    Allergies     Breast cancer     Diabetes mellitus     diagnosed in the     Drug therapy     Dry skin     feet    Hx of radiation therapy     Hyperlipidemia 2000    Hypertension 2018    Insomnia     Irregular heart beat 2005    rare episode  no need to follow with cardiology    Left supracondylar humerus fracture, closed, initial encounter     Lymphocytosis 2009    Neuropathy     diagnosed in the     Sleep apnea     no machine    Thrombocytopenia 2009    low normal current    Type 2 diabetes  mellitus 1998       Past Surgical History:   Procedure Laterality Date    BREAST BIOPSY      BREAST LUMPECTOMY Right 05/25/2023    Procedure: WIRE LOCALIZED RIGHT LUMPECTOMY;  Surgeon: Joe Ruiz MD;  Location: [facility] MAIN OR;  Service: General;  Laterality: Right;    COLONOSCOPY W/ BIOPSIES  07/2021    HYSTERECTOMY      in the 1980's-increased bleeding  bilateral oophorectomy    SHOULDER SURGERY Left 10/03/2022    and arm    SHOULDER SURGERY  2024    removal of metal plate and screws    TUBAL ABDOMINAL LIGATION      WISDOM TOOTH EXTRACTION           Current Outpatient Medications:     anastrozole (ARIMIDEX) 1 MG tablet, Take 1 tablet by mouth Daily., Disp: , Rfl:     atorvastatin (LIPITOR) 40 MG tablet, Take 1 tablet by mouth Every Night., Disp: 90 tablet, Rfl: 1    benazepril (LOTENSIN) 10 MG tablet, Take 1 tablet by mouth Daily., Disp: 90 tablet, Rfl: 3    BIOTIN PO, Take 1 dose by mouth Every Evening. Ld 5/18, Disp: , Rfl:     Calcium Carbonate-Vit D-Min (CALCIUM 1200 PO), Take 1 capsule by mouth Daily., Disp: , Rfl:     Cholecalciferol 1000 units capsule, Take 1 capsule by mouth Every Evening. Ld 5/18, Disp: , Rfl:     CINNAMON PO, Take 1,500 mg by mouth Every Night. Ld 5/18, Disp: , Rfl:     clotrimazole-betamethasone (LOTRISONE) 1-0.05 % cream, Apply 1 Application topically to the appropriate area as directed As Needed., Disp: , Rfl:     Cyanocobalamin (B-12 PO), Take 1,000 mcg by mouth Every Evening. Ld 5/18, Disp: , Rfl:     docusate sodium (COLACE) 100 MG capsule, Take 1 capsule by mouth Daily As Needed. None dos, Disp: , Rfl:     ELDERBERRY PO, Take 2 capsules by mouth Daily., Disp: , Rfl:     glimepiride (AMARYL) 4 MG tablet, TAKE 2 TABLETS BY MOUTH EVERY MORNING AT BREAKFAST., Disp: 180 tablet, Rfl: 3    Insulin Aspart, w/Niacinamide, (Fiasp FlexTouch) 100 UNIT/ML solution pen-injector, Inject 6 units ac tid plus 2 units if blood sugar >200. MDD 30 units., Disp: 30 mL, Rfl: 3    Insulin  Pen Needle (1st Tier Unifine Pentips) 32G X 4 MM misc, Inject 1 Piece under the skin into the appropriate area as directed Every Night., Disp: 400 each, Rfl: 3    Insulin Pen Needle (NovoFine Plus) 32G X 4 MM misc, Inject 1 Piece under the skin into the appropriate area as directed Every Night., Disp: 100 each, Rfl: 3    Omega-3 Fatty Acids (FISH OIL PO), Take 1,200 mg by mouth Every Evening. Ld 5/18, Disp: , Rfl:     ONE TOUCH ULTRA TEST test strip, 1 each by Other route Daily., Disp: , Rfl: 5    pregabalin (LYRICA) 150 MG capsule, Take 1 capsule by mouth 2 (Two) Times a Day., Disp: 180 capsule, Rfl: 1    Tiadylt  MG 24 hr capsule, Take 1 capsule by mouth Daily., Disp: 90 capsule, Rfl: 3    Tresiba FlexTouch 200 UNIT/ML solution pen-injector pen injection, Inject 20 units daily., Disp: , Rfl:     Allergies   Allergen Reactions    Adhesive Tape Other (See Comments)     Blisters      Bee Venom Hives    Topamax [Topiramate] Hives       Family History   Adopted: Yes   Problem Relation Age of Onset    No Known Problems Other        Cancer-related family history is not on file. She was adopted.      Social History     Tobacco Use    Smoking status: Never     Passive exposure: Never    Smokeless tobacco: Never   Vaping Use    Vaping status: Never Used   Substance Use Topics    Alcohol use: Yes     Comment: rare occasion    Drug use: Never     Social History     Social History Narrative    Not on file       ROS:   Review of Systems   Constitutional:  Positive for fatigue. Negative for fever.   HENT:  Negative for congestion and nosebleeds.    Eyes:  Negative for pain and itching.   Respiratory:  Negative for cough and shortness of breath.    Cardiovascular:  Negative for chest pain.   Gastrointestinal:  Negative for abdominal pain, blood in stool, diarrhea, nausea and vomiting.   Endocrine: Negative for cold intolerance and heat intolerance.   Genitourinary:  Negative for difficulty urinating.   Musculoskeletal:   "Negative for arthralgias.   Skin:  Negative for rash.   Neurological:  Negative for dizziness and headaches.   Hematological:  Does not bruise/bleed easily.   Psychiatric/Behavioral:  Negative for behavioral problems.          Objective:    Vital Signs:  Vitals:    06/11/24 0939   BP: 140/72   Pulse: 68   Resp: 14   Temp: 98.2 °F (36.8 °C)   SpO2: 97%   Weight: 66.4 kg (146 lb 6.4 oz)   Height: 154.9 cm (61\")   PainSc: 0-No pain       Body mass index is 27.66 kg/m².    ECOG  (0) Fully active, able to carry on all predisease performance without restriction    Physical Exam:   Physical Exam  Constitutional:       Appearance: Normal appearance.   HENT:      Head: Normocephalic and atraumatic.   Eyes:      Pupils: Pupils are equal, round, and reactive to light.   Cardiovascular:      Rate and Rhythm: Normal rate and regular rhythm.      Pulses: Normal pulses.      Heart sounds: No murmur heard.  Pulmonary:      Effort: Pulmonary effort is normal.      Breath sounds: Normal breath sounds.   Abdominal:      General: There is no distension.      Palpations: Abdomen is soft. There is no mass.      Tenderness: There is no abdominal tenderness.   Musculoskeletal:         General: Normal range of motion.      Cervical back: Normal range of motion and neck supple.   Skin:     General: Skin is warm.   Neurological:      General: No focal deficit present.      Mental Status: She is alert.   Psychiatric:         Mood and Affect: Mood normal.         Lab Results - Last 18 Months   Lab Units 06/11/24  0931 02/27/24  0826 05/15/23  0836   WBC 10*3/mm3 5.14 7.18 7.30   HEMOGLOBIN g/dL 14.1 14.5 14.0   HEMATOCRIT % 43.8 43.9 42.9   PLATELETS 10*3/mm3 101* 90* 126*   MCV fL 83.4 79.4 80.5     Lab Results - Last 18 Months   Lab Units 05/21/24  0808 10/10/23  0745 05/15/23  0836   SODIUM mmol/L 138 142 141   POTASSIUM mmol/L 4.5 4.4 4.3   CHLORIDE mmol/L 102 104 103   CO2 mmol/L 25.0 26.3 23.1   BUN mg/dL 24* 15 21   CREATININE mg/dL " "1.23* 0.99 0.96   CALCIUM mg/dL 9.6 10.1 9.9   BILIRUBIN mg/dL 0.4 0.3 0.3   ALK PHOS U/L 99 94 99   ALT (SGPT) U/L 49* 20 21   AST (SGOT) U/L 31 22 16   GLUCOSE mg/dL 176* 138* 103*       Lab Results   Component Value Date    GLUCOSE 176 (H) 05/21/2024    BUN 24 (H) 05/21/2024    CREATININE 1.23 (H) 05/21/2024    EGFRIFNONA 56 (L) 02/07/2022    BCR 19.5 05/21/2024    K 4.5 05/21/2024    CO2 25.0 05/21/2024    CALCIUM 9.6 05/21/2024    ALBUMIN 4.2 05/21/2024    LABIL2 1.4 07/05/2018    AST 31 05/21/2024    ALT 49 (H) 05/21/2024       Lab Results - Last 18 Months   Lab Units 05/25/23  0733   INR  0.97   APTT seconds 25.4       Lab Results   Component Value Date    IRON 117 04/04/2017    TIBC 504 (H) 04/04/2017    FERRITIN 46 11/02/2017       Lab Results   Component Value Date    FOLATE >24.8 (H) 04/04/2017       No results found for: \"OCCULTBLD\"    No results found for: \"RETICCTPCT\"  Lab Results   Component Value Date    KSCCANEE56 >2,000 (H) 09/30/2022     No results found for: \"SPEP\", \"UPEP\"  No results found for: \"LDH\", \"URICACID\"  Lab Results   Component Value Date    ALESSANDRO  04/04/2017     NEGATIVE This result is designed to aid in the diagnosis of many of the    ALESSANDRO  04/04/2017     systemic autoimmune disorders and is not diagnostic by itself.  Test results    ALESSANDRO  04/04/2017     should be interpreted in conjunction with the clinical evaluation of the    ALESSANDRO  04/04/2017     patient.  SLE patients undergoing steroid therapy may have negative results.     No results found for: \"FIBRINOGEN\", \"HAPTOGLOBIN\"  Lab Results   Component Value Date    PTT 25.4 05/25/2023    INR 0.97 05/25/2023     No results found for: \"\"  No results found for: \"CEA\"  No components found for: \"CA-19-9\"  No results found for: \"PSA\"  No results found for: \"SEDRATE\"       Assessment & Plan     Patient is a 74-year-old female with DCIS, immune thrombocytopenia, vitamin B12 deficiency    DCIS  ER/OR positive, status post multimodality " therapy  Currently on hormone blockade with anastrozole  Continue same tolerating well will continue till July of 2028  No significant side effects. Annual mammogram next in 3/2025  F/u in 3 months for a breast exam will recommend annual breast exam in between her mammograms.  Continue arimidex.     Immune thrombocytopenia  Platelet count has been stable for many years no bleeding concerns  Will monitor platelet count repeat CBC as above in 4 months no indications for treatment as of now  Can consider steroids if platelet count drops less than 30,000  Continue to monitor has been stable low 100s, 90s range.    Vitamin B12 deficiency  Continue oral B12  Repeat labs on follow up, hb is normal    Bone health  Continue calcium vitamin D  Next dexa in 8/2025 last was WNL    Renal insufficiency  Creatinine 1.23 this has increased from her baseline.  Repeat today, increase oral fluid recommendation.  BMP in 3 months    Thank you very much for providing the opportunity to participate in this patient’s care. Please do not hesitate to call if there are any other questions.    F/u in 3 months with BMP and breast exam. After that can be seen every 6 months after

## 2024-06-04 ENCOUNTER — OFFICE VISIT (OUTPATIENT)
Dept: ENDOCRINOLOGY | Facility: CLINIC | Age: 74
End: 2024-06-04
Payer: MEDICARE

## 2024-06-04 VITALS
SYSTOLIC BLOOD PRESSURE: 148 MMHG | BODY MASS INDEX: 27.75 KG/M2 | DIASTOLIC BLOOD PRESSURE: 60 MMHG | HEART RATE: 79 BPM | OXYGEN SATURATION: 96 % | HEIGHT: 61 IN | WEIGHT: 147 LBS

## 2024-06-04 DIAGNOSIS — E78.2 MIXED HYPERLIPIDEMIA: ICD-10-CM

## 2024-06-04 DIAGNOSIS — E55.9 VITAMIN D DEFICIENCY: ICD-10-CM

## 2024-06-04 DIAGNOSIS — Z79.4 TYPE 2 DIABETES MELLITUS WITH DIABETIC POLYNEUROPATHY, WITH LONG-TERM CURRENT USE OF INSULIN: Primary | ICD-10-CM

## 2024-06-04 DIAGNOSIS — E11.42 TYPE 2 DIABETES MELLITUS WITH DIABETIC POLYNEUROPATHY, WITH LONG-TERM CURRENT USE OF INSULIN: Primary | ICD-10-CM

## 2024-06-04 PROCEDURE — 99214 OFFICE O/P EST MOD 30 MIN: CPT | Performed by: INTERNAL MEDICINE

## 2024-06-04 PROCEDURE — 3078F DIAST BP <80 MM HG: CPT | Performed by: INTERNAL MEDICINE

## 2024-06-04 PROCEDURE — 3077F SYST BP >= 140 MM HG: CPT | Performed by: INTERNAL MEDICINE

## 2024-06-04 RX ORDER — INSULIN ASPART INJECTION 100 [IU]/ML
INJECTION, SOLUTION SUBCUTANEOUS
Qty: 30 ML | Refills: 3 | Status: SHIPPED | OUTPATIENT
Start: 2024-06-04

## 2024-06-04 NOTE — PATIENT INSTRUCTIONS
Keep up the good work!  See eye doctor as scheduled.  Increase exercise as planned  Drink plenty of water.  Increase Novolog to 6 units before every meal 3 x / d.  Will change to Fiasp per insurance coverage.  Continue other diabetes & chol meds & Omega 3, calcium & vit D supplements.  Call if blood sugars are running under 100 or over 200.  F/u in 6 months, with fasting labs prior.

## 2024-06-06 ENCOUNTER — TELEPHONE (OUTPATIENT)
Dept: ENDOCRINOLOGY | Facility: CLINIC | Age: 74
End: 2024-06-06
Payer: MEDICARE

## 2024-06-06 NOTE — TELEPHONE ENCOUNTER
Caller: CHRISS([facility])    Relationship: Other    Best call back number: [phone]    What form or medical record are you requesting: OFFICE NOTES    Who is requesting this form or medical record from you: [facility]    How would you like to receive the form or medical records (pick-up, mail, fax):   If fax, what is the fax number: [phone]  Timeframe paperwork needed: AS SOON AS POSSIBLE    Additional notes: CHRISS NEEDS UPDATED OFFICE NOTES.

## 2024-06-07 ENCOUNTER — TELEPHONE (OUTPATIENT)
Dept: ENDOCRINOLOGY | Facility: CLINIC | Age: 74
End: 2024-06-07
Payer: MEDICARE

## 2024-06-07 NOTE — PROGRESS NOTES
[facility]        Patient Care Team:  Mercedez Guevara MD as PCP - General (Family Medicine)  Joe Ruiz MD as Surgeon (General Surgery)  Vic Cerna MD as Consulting Physician (Endocrinology)  Jose Sellers MD as Consulting Physician (Hematology and Oncology)  Monty Nation MD as Consulting Physician (Ophthalmology)  Yonatan Fernandez Jr., JOAN as Consulting Physician (Podiatry)  Uriel Montenegro MD as Consulting Physician (Orthopedic Surgery)  Galileo Oquendo MD as Consulting Physician (Hematology and Oncology)  Elias Coles MD as Consulting Physician (Radiation Oncology)  Jeffy Noyola MD as Consulting Physician (Cardiology)    Chief Complaint:    Chief Complaint   Patient presents with    Diabetes     Fu / DM type 2 /          Subjective   Here for diabetes f/u  Blood sugars: higher after supper. Using [facility] cGMS  Pt has agreed to wear the CGM device and share readings on a regular basis with our office  Exercise program: yard work. Rode bike in [place]  Taking vit D & calcium 2x/d    Interval History:     Patient Comments:  no diarrhea since off metformin  Patient Denies: hypoglycemia  History taken from: patient    Review of Systems:   Review of Systems   Constitutional:  Positive for unexpected weight gain.   Eyes:  Negative for blurred vision.   Gastrointestinal:  Negative for nausea.   Endocrine: Negative for polyuria.   Neurological:  Negative for headache.   Gained 9 lb since last visit    Objective     Vital Signs     Vitals:    06/04/24 0806   BP: 148/60   Pulse: 79   SpO2: 96%         Physical Exam:     General Appearance:    Alert, cooperative, in no acute distress. Obese   Head:    Normocephalic, without obvious abnormality, atraumatic   Eyes:            Lids and lashes normal, conjunctivae and sclerae normal, no   icterus, no pallor, corneas clear, PERRLA   Throat:   No oral lesions,  oral mucosa moist   Neck:   36 cm in  circumference, thyroid firm, no carotid bruit   Lungs:     Clear     Heart:    Regular rhythm and normal rate   Chest Wall:    No abnormalities observed   Abdomen:     Normal bowel sounds, soft                 Extremities:   L arm scar (wound closed), no edema               Pulses:   Pulses palpable and equal bilaterally   Skin:   Dry. No bruising or rash   Neurologic:  DTR absent, able to feel the 10g monofilament          Results Review:    I have reviewed the patient's new clinical results, labs & imaging.    Medication Review:   Prior to Admission medications    Medication Sig Start Date End Date Taking? Authorizing Provider   anastrozole (ARIMIDEX) 1 MG tablet Take 1 tablet by mouth Daily.   Yes Ros Shah MD   atorvastatin (LIPITOR) 40 MG tablet Take 1 tablet by mouth Every Night. 4/12/24  Yes Mercedez Guevara MD   benazepril (LOTENSIN) 10 MG tablet Take 1 tablet by mouth Daily. 1/12/24  Yes Mercedez Guevara MD   BIOTIN PO Take 1 dose by mouth Every Evening. Ld 5/18 4/10/18  Yes Ros Shah MD   Calcium Carbonate-Vit D-Min (CALCIUM 1200 PO) Take 1 capsule by mouth Daily.   Yes Ros Shah MD   Cholecalciferol 1000 units capsule Take 1 capsule by mouth Every Evening. Ld 5/18 3/31/15  Yes Ros Shah MD   CINNAMON PO Take 1,500 mg by mouth Every Night. Ld 5/18 4/10/18  Yes Ros Shah MD   clotrimazole-betamethasone (LOTRISONE) 1-0.05 % cream Apply 1 Application topically to the appropriate area as directed As Needed. 12/26/21  Yes Ros Shah MD   Cyanocobalamin (B-12 PO) Take 1,000 mcg by mouth Every Evening.  5/18 4/10/18  Yes Ros Shah MD   docusate sodium (COLACE) 100 MG capsule Take 1 capsule by mouth Daily As Needed. None dos   Yes Ros Shah MD   ELDERBERRY PO Take 2 capsules by mouth Daily.   Yes Ros Shah MD   glimepiride (AMARYL) 4 MG tablet TAKE 2 TABLETS BY MOUTH EVERY MORNING AT  BREAKFAST. 8/28/23  Yes Vic Cerna MD   Insulin Pen Needle (1st Tier Unifine Pentips) 32G X 4 MM misc Inject 1 Piece under the skin into the appropriate area as directed Every Night. 12/6/23  Yes Vic Cerna MD   Insulin Pen Needle (NovoFine Plus) 32G X 4 MM misc Inject 1 Piece under the skin into the appropriate area as directed Every Night. 6/26/23  Yes Vic Cerna MD   Omega-3 Fatty Acids (FISH OIL PO) Take 1,200 mg by mouth Every Evening. Ld 5/18 4/11/17  Yes Ros Shah MD   ONE TOUCH ULTRA TEST test strip 1 each by Other route Daily. 6/19/19  Yes Ros Shah MD   pregabalin (LYRICA) 150 MG capsule Take 1 capsule by mouth 2 (Two) Times a Day. 4/12/24  Yes Mercedez Guevara MD   Tiadylt  MG 24 hr capsule Take 1 capsule by mouth Daily. 4/12/24  Yes Mercedez Guevara MD   Tresiba FlexTouch 200 UNIT/ML solution pen-injector pen injection Inject 20 units daily. 10/17/23  Yes Vic Cerna MD   Insulin Aspart, w/Niacinamide, (Fiasp FlexTouch) 100 UNIT/ML solution pen-injector Inject 6 units ac tid plus 2 units if blood sugar >200. MDD 30 units. 6/4/24   Vic Cerna MD         Lab Results   Component Value Date    HGBA1C 8.54 (H) 05/21/2024    HGBA1C 9.70 (H) 10/10/2023    HGBA1C 9.1 (H) 02/27/2023      Lab Results   Component Value Date    GLUCOSE 176 (H) 05/21/2024    BUN 24 (H) 05/21/2024    CREATININE 1.23 (H) 05/21/2024    EGFRIFNONA 56 (L) 02/07/2022    BCR 19.5 05/21/2024    K 4.5 05/21/2024    CO2 25.0 05/21/2024    CALCIUM 9.6 05/21/2024    ALBUMIN 4.2 05/21/2024    LABIL2 1.4 07/05/2018    AST 31 05/21/2024    ALT 49 (H) 05/21/2024    CHOL 162 10/10/2023     (H) 10/10/2023    HDL 32 (L) 10/10/2023    TRIG 159 (H) 10/10/2023     Lab Results   Component Value Date    TSH 3.980 10/10/2023    DMOQ25SZ 41.2 10/10/2023       Assessment & Plan     Diagnoses and all orders for this visit:    1. Type 2 diabetes mellitus with diabetic  polyneuropathy, with long-term current use of insulin (Primary)  -     Insulin Aspart, w/Niacinamide, (Fiasp FlexTouch) 100 UNIT/ML solution pen-injector; Inject 6 units ac tid plus 2 units if blood sugar >200. MDD 30 units.  Dispense: 30 mL; Refill: 3  -     Hemoglobin A1c; Future  -     Microalbumin / Creatinine Urine Ratio - Urine, Clean Catch; Future  -     Lipid Panel; Future    2. Mixed hyperlipidemia  -     Comprehensive Metabolic Panel; Future  -     Lipid Panel; Future  -     TSH; Future    3. Vitamin D deficiency  -     Vitamin D,25-Hydroxy; Future    Glucose control improved. Will check lipid & vit D status next visit.    See eye doctor as scheduled.  Increase exercise as planned  Drink plenty of water.  Increase Novolog to 6 units before every meal 3 x / d.  Will change to Fiasp per insurance coverage.  Continue other diabetes & chol meds & Omega 3, calcium & vit D supplements.  Call if blood sugars are running under 100 or over 200.        Vic Cerna MD  06/06/24  23:03 EDT

## 2024-06-10 ENCOUNTER — OFFICE VISIT (OUTPATIENT)
Dept: SURGERY | Facility: CLINIC | Age: 74
End: 2024-06-10
Payer: MEDICARE

## 2024-06-10 VITALS
OXYGEN SATURATION: 97 % | HEART RATE: 62 BPM | SYSTOLIC BLOOD PRESSURE: 149 MMHG | DIASTOLIC BLOOD PRESSURE: 73 MMHG | TEMPERATURE: 98.2 F | HEIGHT: 61 IN | BODY MASS INDEX: 27.56 KG/M2 | WEIGHT: 146 LBS

## 2024-06-10 DIAGNOSIS — D05.11 BREAST NEOPLASM, TIS (DCIS), RIGHT: Primary | ICD-10-CM

## 2024-06-10 PROCEDURE — 99213 OFFICE O/P EST LOW 20 MIN: CPT | Performed by: SURGERY

## 2024-06-10 PROCEDURE — 1160F RVW MEDS BY RX/DR IN RCRD: CPT | Performed by: SURGERY

## 2024-06-10 PROCEDURE — 1159F MED LIST DOCD IN RCRD: CPT | Performed by: SURGERY

## 2024-06-10 PROCEDURE — 3078F DIAST BP <80 MM HG: CPT | Performed by: SURGERY

## 2024-06-10 PROCEDURE — 3077F SYST BP >= 140 MM HG: CPT | Performed by: SURGERY

## 2024-06-10 NOTE — PROGRESS NOTES
GENERAL SURGERY ESTABLISHED PATIENT NOTE    Patient Care Team:  Mercedez Guevara MD as PCP - General (Family Medicine)  Joe Ruiz MD as Surgeon (General Surgery)  Vic Cerna MD as Consulting Physician (Endocrinology)  Jose Sellers MD as Consulting Physician (Hematology and Oncology)  Monty Nation MD as Consulting Physician (Ophthalmology)  Yonatan Fernandez Jr., DPM as Consulting Physician (Podiatry)  Uriel Montenegro MD as Consulting Physician (Orthopedic Surgery)  Galileo Oquendo MD as Consulting Physician (Hematology and Oncology)  Elias Coles MD as Consulting Physician (Radiation Oncology)  Jeffy Noyola MD as Consulting Physician (Cardiology)    Reason for follow-up: 6-month follow-up for right breast DCIS    Subjective     Patient is a 74 y.o. female presents for 6-month follow-up for right breast DCIS.  The patient underwent right wire localized lumpectomy on 5/25/2023.  She completed 16 treatments of radiation therapy without any complaints.  She was following up with Dr. Cabral for endocrine therapy, and now follows up at the [facility].  She states that at first she had some intermittent chest pain and fatigue, but that has gotten better.  She is pleased with her cosmetic outcome, has no issues with range of motion or pain.  Since I last saw her, the plate in her left shoulder was removed due to an allergic reaction.  She seems to have recovered well without any complaints    Review of Systems   Genitourinary:  Negative for breast discharge, breast lump and breast pain.   Hematological:  Negative for adenopathy. Does not bruise/bleed easily.        History  Past Medical History:   Diagnosis Date    Allergies     Breast cancer     Diabetes mellitus     diagnosed in the 1990's    Drug therapy     Dry skin     feet    Hx of radiation therapy     Hyperlipidemia 2000    Hypertension 2018    Insomnia     Irregular heart beat 2005    rare  episode  no need to follow with cardiology    Left supracondylar humerus fracture, closed, initial encounter     Lymphocytosis 12/2009    Neuropathy     diagnosed in the 1990's    Sleep apnea     no machine    Thrombocytopenia 12/2009    low normal current    Type 2 diabetes mellitus 1998     Past Surgical History:   Procedure Laterality Date    BREAST BIOPSY      BREAST LUMPECTOMY Right 05/25/2023    Procedure: WIRE LOCALIZED RIGHT LUMPECTOMY;  Surgeon: Joe Ruiz MD;  Location: [facility] MAIN OR;  Service: General;  Laterality: Right;    COLONOSCOPY W/ BIOPSIES  07/2021    HYSTERECTOMY      in the 1980's-increased bleeding  bilateral oophorectomy    SHOULDER SURGERY Left 10/03/2022    and arm    SHOULDER SURGERY  2024    removal of metal plate and screws    TUBAL ABDOMINAL LIGATION      WISDOM TOOTH EXTRACTION       Family History   Adopted: Yes   Problem Relation Age of Onset    No Known Problems Other      Social History     Tobacco Use    Smoking status: Never     Passive exposure: Never    Smokeless tobacco: Never   Vaping Use    Vaping status: Never Used   Substance Use Topics    Alcohol use: Yes     Comment: rare occasion    Drug use: Never     (Not in a hospital admission)    Allergies:  Adhesive tape, Bee venom, and Topamax [topiramate]    Objective     Vital Signs  Temp:  [98.2 °F (36.8 °C)] 98.2 °F (36.8 °C)  Heart Rate:  [62] 62  BP: (149)/(73) 149/73    Physical Exam  Vitals reviewed. Exam conducted with a chaperone present.   Constitutional:       Appearance: She is well-developed.   HENT:      Head: Normocephalic and atraumatic.   Eyes:      Pupils: Pupils are equal, round, and reactive to light.   Cardiovascular:      Rate and Rhythm: Normal rate and regular rhythm.   Pulmonary:      Effort: Pulmonary effort is normal.      Breath sounds: Normal breath sounds.   Chest:      Comments: Well-healed transverse right breast incision without any surrounding erythema, ration, or drainage to  suggest infection.  Fine fibrocystic changes scattered bilaterally.  No overlying skin changes, no discharge from the nipple bilaterally.  Abdominal:      General: There is no distension.      Palpations: Abdomen is soft.      Tenderness: There is no abdominal tenderness.      Hernia: No hernia is present.   Musculoskeletal:         General: Normal range of motion.      Cervical back: Normal range of motion.   Lymphadenopathy:      Cervical: No cervical adenopathy.      Upper Body:      Right upper body: No supraclavicular or axillary adenopathy.      Left upper body: No supraclavicular or axillary adenopathy.   Skin:     General: Skin is warm and dry.      Findings: No rash.   Neurological:      Mental Status: She is alert and oriented to person, place, and time.         Results Review:   Lab Results (last 24 hours)       ** No results found for the last 24 hours. **          No radiology results for the last day      I reviewed the patient's new imaging results and agree with the interpretation.  I reviewed the patient's other test results and agree with the interpretation    Assessment & Plan   Right breast DCIS    Follow-up with medical oncology as directed.  Continue endocrine therapy and screening mammography as directed  Follow-up with me in 6 months or sooner if issues arise  Follow-up with radiation oncology as directed    I discussed the patients findings and my recommendations with the patient.     Joe Ruiz MD  06/10/24  08:30 EDT

## 2024-06-11 ENCOUNTER — LAB (OUTPATIENT)
Dept: LAB | Facility: HOSPITAL | Age: 74
End: 2024-06-11
Payer: MEDICARE

## 2024-06-11 ENCOUNTER — OFFICE VISIT (OUTPATIENT)
Dept: ONCOLOGY | Facility: CLINIC | Age: 74
End: 2024-06-11
Payer: MEDICARE

## 2024-06-11 VITALS
RESPIRATION RATE: 14 BRPM | HEIGHT: 61 IN | TEMPERATURE: 98.2 F | OXYGEN SATURATION: 97 % | WEIGHT: 146.4 LBS | DIASTOLIC BLOOD PRESSURE: 72 MMHG | SYSTOLIC BLOOD PRESSURE: 140 MMHG | BODY MASS INDEX: 27.64 KG/M2 | HEART RATE: 68 BPM

## 2024-06-11 DIAGNOSIS — D69.6 THROMBOCYTOPENIA: Primary | ICD-10-CM

## 2024-06-11 LAB
ALBUMIN SERPL-MCNC: 4.2 G/DL (ref 3.5–5.2)
ALBUMIN/GLOB SERPL: 1.5 G/DL
ALP SERPL-CCNC: 102 U/L (ref 39–117)
ALT SERPL W P-5'-P-CCNC: 31 U/L (ref 1–33)
ANION GAP SERPL CALCULATED.3IONS-SCNC: 11.8 MMOL/L (ref 5–15)
AST SERPL-CCNC: 25 U/L (ref 1–32)
BASOPHILS # BLD AUTO: 0.02 10*3/MM3 (ref 0–0.2)
BASOPHILS NFR BLD AUTO: 0.4 % (ref 0–1.5)
BILIRUB SERPL-MCNC: 0.3 MG/DL (ref 0–1.2)
BUN SERPL-MCNC: 22 MG/DL (ref 8–23)
BUN/CREAT SERPL: 20.6 (ref 7–25)
CALCIUM SPEC-SCNC: 9.8 MG/DL (ref 8.6–10.5)
CHLORIDE SERPL-SCNC: 106 MMOL/L (ref 98–107)
CO2 SERPL-SCNC: 24.2 MMOL/L (ref 22–29)
CREAT SERPL-MCNC: 1.07 MG/DL (ref 0.57–1)
DEPRECATED RDW RBC AUTO: 51 FL (ref 37–54)
EGFRCR SERPLBLD CKD-EPI 2021: 53.9 ML/MIN/1.73
EOSINOPHIL # BLD AUTO: 0.25 10*3/MM3 (ref 0–0.4)
EOSINOPHIL NFR BLD AUTO: 4.9 % (ref 0.3–6.2)
ERYTHROCYTE [DISTWIDTH] IN BLOOD BY AUTOMATED COUNT: 16.5 % (ref 12.3–15.4)
GLOBULIN UR ELPH-MCNC: 2.8 GM/DL
GLUCOSE SERPL-MCNC: 239 MG/DL (ref 65–99)
HCT VFR BLD AUTO: 43.8 % (ref 34–46.6)
HGB BLD-MCNC: 14.1 G/DL (ref 12–15.9)
HOLD SPECIMEN: NORMAL
LYMPHOCYTES # BLD AUTO: 2.08 10*3/MM3 (ref 0.7–3.1)
LYMPHOCYTES NFR BLD AUTO: 40.5 % (ref 19.6–45.3)
MCH RBC QN AUTO: 26.9 PG (ref 26.6–33)
MCHC RBC AUTO-ENTMCNC: 32.2 G/DL (ref 31.5–35.7)
MCV RBC AUTO: 83.4 FL (ref 79–97)
MONOCYTES # BLD AUTO: 0.53 10*3/MM3 (ref 0.1–0.9)
MONOCYTES NFR BLD AUTO: 10.3 % (ref 5–12)
NEUTROPHILS NFR BLD AUTO: 2.26 10*3/MM3 (ref 1.7–7)
NEUTROPHILS NFR BLD AUTO: 43.9 % (ref 42.7–76)
PLATELET # BLD AUTO: 101 10*3/MM3 (ref 140–450)
PMV BLD AUTO: 12.6 FL (ref 6–12)
POTASSIUM SERPL-SCNC: 4.5 MMOL/L (ref 3.5–5.2)
PROT SERPL-MCNC: 7 G/DL (ref 6–8.5)
RBC # BLD AUTO: 5.25 10*6/MM3 (ref 3.77–5.28)
SODIUM SERPL-SCNC: 142 MMOL/L (ref 136–145)
WBC NRBC COR # BLD AUTO: 5.14 10*3/MM3 (ref 3.4–10.8)

## 2024-06-11 PROCEDURE — 80053 COMPREHEN METABOLIC PANEL: CPT | Performed by: INTERNAL MEDICINE

## 2024-06-11 PROCEDURE — 36415 COLL VENOUS BLD VENIPUNCTURE: CPT

## 2024-06-11 PROCEDURE — 3077F SYST BP >= 140 MM HG: CPT | Performed by: INTERNAL MEDICINE

## 2024-06-11 PROCEDURE — 3078F DIAST BP <80 MM HG: CPT | Performed by: INTERNAL MEDICINE

## 2024-06-11 PROCEDURE — G2211 COMPLEX E/M VISIT ADD ON: HCPCS | Performed by: INTERNAL MEDICINE

## 2024-06-11 PROCEDURE — 1126F AMNT PAIN NOTED NONE PRSNT: CPT | Performed by: INTERNAL MEDICINE

## 2024-06-11 PROCEDURE — 99214 OFFICE O/P EST MOD 30 MIN: CPT | Performed by: INTERNAL MEDICINE

## 2024-06-11 PROCEDURE — 85025 COMPLETE CBC W/AUTO DIFF WBC: CPT

## 2024-06-18 ENCOUNTER — PATIENT OUTREACH (OUTPATIENT)
Dept: ONCOLOGY | Facility: CLINIC | Age: 74
End: 2024-06-18
Payer: MEDICARE

## 2024-06-18 NOTE — PROGRESS NOTES
I called and spoke with the patient to see how she is doing and to schedule her for a survivorship appointment.    The patient was agreeable.     The patient stated that she is doing well.  The patient has been scheduled for her Survivorship appointment for 7/25/2024 at 10:30am.

## 2024-07-09 DIAGNOSIS — Z79.4 TYPE 2 DIABETES MELLITUS WITH DIABETIC POLYNEUROPATHY, WITH LONG-TERM CURRENT USE OF INSULIN: Primary | ICD-10-CM

## 2024-07-09 DIAGNOSIS — E11.42 TYPE 2 DIABETES MELLITUS WITH DIABETIC POLYNEUROPATHY, WITH LONG-TERM CURRENT USE OF INSULIN: Primary | ICD-10-CM

## 2024-07-09 NOTE — TELEPHONE ENCOUNTER
Pt's insurance will not cover Novolog and at last appt. You sent in Fiasp. Fiasp is on backorder, pt insurance will cover novolin and novolog 70/30 flex pen. Pt would like rx sent to [facility] on [address]. Please advise.

## 2024-07-09 NOTE — TELEPHONE ENCOUNTER
These options are not equivalent to Novolog nor Fiasp.  Novolin R is acceptable, but aspart, lispro, Lyumjev, Admelog would be preferable.

## 2024-07-11 RX ORDER — INSULIN ASPART 100 [IU]/ML
INJECTION, SOLUTION INTRAVENOUS; SUBCUTANEOUS
Qty: 30 ML | Refills: 3 | Status: SHIPPED | OUTPATIENT
Start: 2024-07-11

## 2024-07-11 NOTE — TELEPHONE ENCOUNTER
I submitted a PA for Novolog flexpen on [employer] (Key: [id number]) and was approved. Rx pending, please review and sign.

## 2024-07-22 NOTE — PROGRESS NOTES
Chief Complaint  Hypertension    History of Present Illness  Gi Velez presents today for follow up on hypertension    Hypertension  Patient does check blood pressure at home.   Home readings range from 80-90's/50's to 120's/ 80's per patient.  Patient reports  tiredness/fatigue .  Patient reports they are taking medications as prescribed and they are not having side effects.   DO not feel any different with lower BP readings.     History of right breast cancer and thrombocytopenia.  She was following with Dr. Sellers had transferred care to Dr. Oquendo on 02/27/2024. Changed to Dr. Oquendo who is also leaving and now scheduled with Adeola on 9/11/24     Diabetes is managed by Dr. Cerna. Follow up 12/4/24 now taking insulin 3 times daily    Have started walking for exercise.     Patient Care Team:  Mercedez Guevara MD as PCP - General (Family Medicine)  Joe Ruiz MD as Surgeon (General Surgery)  Vic Cerna MD as Consulting Physician (Endocrinology)  Jose Sellers MD as Consulting Physician (Hematology and Oncology)  Monty Nation MD as Consulting Physician (Ophthalmology)  Yonatan Fernandez Jr., DPM as Consulting Physician (Podiatry)  Uriel Montenegro MD as Consulting Physician (Orthopedic Surgery)  Galileo Oquendo MD as Consulting Physician (Hematology and Oncology)  Elias Coles MD as Consulting Physician (Radiation Oncology)  Jeffy Noyola MD as Consulting Physician (Cardiology)   Current Outpatient Medications on File Prior to Visit   Medication Sig    anastrozole (ARIMIDEX) 1 MG tablet Take 1 tablet by mouth Daily.    atorvastatin (LIPITOR) 40 MG tablet Take 1 tablet by mouth Every Night.    benazepril (LOTENSIN) 10 MG tablet Take 1 tablet by mouth Daily.    BIOTIN PO Take 1 dose by mouth Every Evening. Ld 5/18    Calcium Carbonate-Vit D-Min (CALCIUM 1200 PO) Take 1 capsule by mouth Daily.    Cholecalciferol 1000 units capsule Take 1 capsule by mouth Every  "Evening. Ld 5/18    CINNAMON PO Take 1,500 mg by mouth Every Night. Ld 5/18    clotrimazole-betamethasone (LOTRISONE) 1-0.05 % cream Apply 1 Application topically to the appropriate area as directed As Needed.    Cyanocobalamin (B-12 PO) Take 1,000 mcg by mouth Every Evening. Ld 5/18    docusate sodium (COLACE) 100 MG capsule Take 1 capsule by mouth Daily As Needed. None dos    ELDERBERRY PO Take 2 capsules by mouth Daily.    glimepiride (AMARYL) 4 MG tablet TAKE 2 TABLETS BY MOUTH EVERY MORNING AT BREAKFAST.    insulin aspart (NovoLOG FlexPen) 100 UNIT/ML solution pen-injector sc pen Inject 6 units ac tid plus 2 units if blood sugar >200. MDD 30 units.    Insulin Pen Needle (1st Tier Unifine Pentips) 32G X 4 MM misc Inject 1 Piece under the skin into the appropriate area as directed Every Night.    Omega-3 Fatty Acids (FISH OIL PO) Take 1,200 mg by mouth Every Evening. Ld 5/18    ONE TOUCH ULTRA TEST test strip 1 each by Other route Daily.    pregabalin (LYRICA) 150 MG capsule Take 1 capsule by mouth 2 (Two) Times a Day.    Tresiba FlexTouch 200 UNIT/ML solution pen-injector pen injection Inject 20 units daily.    [DISCONTINUED] Tiadylt  MG 24 hr capsule Take 1 capsule by mouth Daily.    Insulin Aspart, w/Niacinamide, (Fiasp FlexTouch) 100 UNIT/ML solution pen-injector Inject 6 units ac tid plus 2 units if blood sugar >200. MDD 30 units. (Patient not taking: Reported on 7/23/2024)     No current facility-administered medications on file prior to visit.       Objective   Vital Signs:   /62 (BP Location: Right arm, Patient Position: Sitting, Cuff Size: Adult)   Pulse 78   Temp 97.3 °F (36.3 °C) (Temporal)   Resp 18   Ht 154.9 cm (61\")   Wt 67.2 kg (148 lb 3.2 oz)   SpO2 95%   BMI 28.00 kg/m²    BP Readings from Last 3 Encounters:   07/23/24 120/62   06/11/24 140/72   06/10/24 149/73     Wt Readings from Last 3 Encounters:   07/23/24 67.2 kg (148 lb 3.2 oz)   06/11/24 66.4 kg (146 lb 6.4 oz) "   06/10/24 66.2 kg (146 lb)         Physical Exam  Vitals and nursing note reviewed.   Constitutional:       General: She is not in acute distress.     Appearance: She is well-developed.   HENT:      Head: Normocephalic and atraumatic.   Cardiovascular:      Rate and Rhythm: Normal rate and regular rhythm.      Heart sounds: No murmur heard.  Pulmonary:      Effort: Pulmonary effort is normal.      Breath sounds: Normal breath sounds. No wheezing.   Musculoskeletal:         General: Normal range of motion.   Skin:     General: Skin is warm and dry.      Findings: No rash.   Neurological:      Mental Status: She is alert and oriented to person, place, and time.   Psychiatric:      Comments: Very pleasant and talkative.  Sharing family history and also history that she is adopted.           No visits with results within 1 Day(s) from this visit.   Latest known visit with results is:   Lab on 06/11/2024   Component Date Value Ref Range Status    Glucose 06/11/2024 239 (H)  65 - 99 mg/dL Final    BUN 06/11/2024 22  8 - 23 mg/dL Final    Creatinine 06/11/2024 1.07 (H)  0.57 - 1.00 mg/dL Final    Sodium 06/11/2024 142  136 - 145 mmol/L Final    Potassium 06/11/2024 4.5  3.5 - 5.2 mmol/L Final    Chloride 06/11/2024 106  98 - 107 mmol/L Final    CO2 06/11/2024 24.2  22.0 - 29.0 mmol/L Final    Calcium 06/11/2024 9.8  8.6 - 10.5 mg/dL Final    Total Protein 06/11/2024 7.0  6.0 - 8.5 g/dL Final    Albumin 06/11/2024 4.2  3.5 - 5.2 g/dL Final    ALT (SGPT) 06/11/2024 31  1 - 33 U/L Final    AST (SGOT) 06/11/2024 25  1 - 32 U/L Final    Alkaline Phosphatase 06/11/2024 102  39 - 117 U/L Final    Total Bilirubin 06/11/2024 0.3  0.0 - 1.2 mg/dL Final    Globulin 06/11/2024 2.8  gm/dL Final    A/G Ratio 06/11/2024 1.5  g/dL Final    BUN/Creatinine Ratio 06/11/2024 20.6  7.0 - 25.0 Final    Anion Gap 06/11/2024 11.8  5.0 - 15.0 mmol/L Final    eGFR 06/11/2024 53.9 (L)  >60.0 mL/min/1.73 Final    WBC 06/11/2024 5.14  3.40 - 10.80  10*3/mm3 Final    RBC 06/11/2024 5.25  3.77 - 5.28 10*6/mm3 Final    Hemoglobin 06/11/2024 14.1  12.0 - 15.9 g/dL Final    Hematocrit 06/11/2024 43.8  34.0 - 46.6 % Final    MCV 06/11/2024 83.4  79.0 - 97.0 fL Final    MCH 06/11/2024 26.9  26.6 - 33.0 pg Final    MCHC 06/11/2024 32.2  31.5 - 35.7 g/dL Final    RDW 06/11/2024 16.5 (H)  12.3 - 15.4 % Final    RDW-SD 06/11/2024 51.0  37.0 - 54.0 fl Final    MPV 06/11/2024 12.6 (H)  6.0 - 12.0 fL Final    Platelets 06/11/2024 101 (L)  140 - 450 10*3/mm3 Final    Neutrophil % 06/11/2024 43.9  42.7 - 76.0 % Final    Lymphocyte % 06/11/2024 40.5  19.6 - 45.3 % Final    Monocyte % 06/11/2024 10.3  5.0 - 12.0 % Final    Eosinophil % 06/11/2024 4.9  0.3 - 6.2 % Final    Basophil % 06/11/2024 0.4  0.0 - 1.5 % Final    Neutrophils, Absolute 06/11/2024 2.26  1.70 - 7.00 10*3/mm3 Final    Lymphocytes, Absolute 06/11/2024 2.08  0.70 - 3.10 10*3/mm3 Final    Monocytes, Absolute 06/11/2024 0.53  0.10 - 0.90 10*3/mm3 Final    Eosinophils, Absolute 06/11/2024 0.25  0.00 - 0.40 10*3/mm3 Final    Basophils, Absolute 06/11/2024 0.02  0.00 - 0.20 10*3/mm3 Final    Extra Tube 06/11/2024 Hold for add-ons.   Final    Auto resulted.     A1C Last 3 Results          10/10/2023    07:45 5/21/2024    08:08   HGBA1C Last 3 Results   Hemoglobin A1C 9.70  8.54      Lab Results   Component Value Date    CHOL 162 10/10/2023    TRIG 159 (H) 10/10/2023    HDL 32 (L) 10/10/2023     (H) 10/10/2023     Lab Results   Component Value Date    TSH 3.980 10/10/2023     Lab Results   Component Value Date    GLUCOSE 239 (H) 06/11/2024    BUN 22 06/11/2024    CREATININE 1.07 (H) 06/11/2024    EGFRIFNONA 56 (L) 02/07/2022    BCR 20.6 06/11/2024    K 4.5 06/11/2024    CO2 24.2 06/11/2024    CALCIUM 9.8 06/11/2024    ALBUMIN 4.2 06/11/2024    LABIL2 1.4 07/05/2018    AST 25 06/11/2024    ALT 31 06/11/2024     Lab Results   Component Value Date    WBC 5.14 06/11/2024    HGB 14.1 06/11/2024    HCT 43.8  06/11/2024    MCV 83.4 06/11/2024     (L) 06/11/2024                      Assessment and Plan    Diagnoses and all orders for this visit:    1. Primary hypertension (Primary)  -     Tiadylt  MG 24 hr capsule; Take 1 capsule by mouth Daily.  Dispense: 90 capsule; Refill: 3    2. Overweight (BMI 25.0-29.9)    Hypertension at goal continue current medications    The patient was counseled regarding nutrition, physical activity, healthy weight, injury prevention, immunizations and preventative health screenings.  She is currently up-to-date on immunizations, will need flu and COVID vaccinations in the fall.  In preparation for returning for Medicare wellness, did give information on advance care directives, living galicia, and healthcare surrogate.  She states she will discuss this with her son and daughter and possibly bring one of them with her to the appointment.      Medications Discontinued During This Encounter   Medication Reason    Tiadylt  MG 24 hr capsule Reorder         Follow Up     Return in about 3 months (around 10/23/2024) for Medicare Wellness ACP,  and BP check.    Patient was given instructions and counseling regarding her condition or for health maintenance advice. Please see specific information pulled into the AVS if appropriate.

## 2024-07-23 ENCOUNTER — OFFICE VISIT (OUTPATIENT)
Dept: FAMILY MEDICINE CLINIC | Facility: CLINIC | Age: 74
End: 2024-07-23
Payer: MEDICARE

## 2024-07-23 VITALS
OXYGEN SATURATION: 95 % | WEIGHT: 148.2 LBS | DIASTOLIC BLOOD PRESSURE: 62 MMHG | BODY MASS INDEX: 27.98 KG/M2 | SYSTOLIC BLOOD PRESSURE: 120 MMHG | HEART RATE: 78 BPM | RESPIRATION RATE: 18 BRPM | HEIGHT: 61 IN | TEMPERATURE: 97.3 F

## 2024-07-23 DIAGNOSIS — E66.3 OVERWEIGHT (BMI 25.0-29.9): ICD-10-CM

## 2024-07-23 DIAGNOSIS — I10 PRIMARY HYPERTENSION: Primary | ICD-10-CM

## 2024-07-23 PROCEDURE — 99213 OFFICE O/P EST LOW 20 MIN: CPT | Performed by: FAMILY MEDICINE

## 2024-07-23 PROCEDURE — 3078F DIAST BP <80 MM HG: CPT | Performed by: FAMILY MEDICINE

## 2024-07-23 PROCEDURE — 1126F AMNT PAIN NOTED NONE PRSNT: CPT | Performed by: FAMILY MEDICINE

## 2024-07-23 PROCEDURE — G2211 COMPLEX E/M VISIT ADD ON: HCPCS | Performed by: FAMILY MEDICINE

## 2024-07-23 PROCEDURE — 3074F SYST BP LT 130 MM HG: CPT | Performed by: FAMILY MEDICINE

## 2024-07-23 RX ORDER — DILTIAZEM HYDROCHLORIDE 120 MG/1
120 CAPSULE, EXTENDED RELEASE ORAL DAILY
Qty: 90 CAPSULE | Refills: 3 | Status: SHIPPED | OUTPATIENT
Start: 2024-07-23

## 2024-07-24 NOTE — PROGRESS NOTES
SURVIVORSHIP OFFICE VISIT    PATIENT NAME:Gi Velez  :1950  MRN: [id number]    Chief Complaint   Patient presents with    Follow-up    Survivorship       SUBJECTIVE:  Gi Velez is a 74 y.o. female being followed by Dr. Galileo Oquendo  for DCIS status postlumpectomy and radiation therapy currently on anastrozole 1 mg daily along with calcium and vitamin D supplementation. The patient is here today in our Cancer Survivorship Clinic, to review her survivorship care plan.        REVIEW OF SYSTEMS:  Review of Systems   Constitutional:  Negative for fatigue.   Musculoskeletal:  Positive for arthralgias (States she had prior to AI).   Psychiatric/Behavioral:  Negative for behavioral problems.        Past Medical History:   Diagnosis Date    Allergies     Breast cancer     Diabetes mellitus     diagnosed in the     Drug therapy     Dry skin     feet    Hx of radiation therapy     Hyperlipidemia 2000    Hypertension 2018    Insomnia     Irregular heart beat 2005    rare episode  no need to follow with cardiology    Left supracondylar humerus fracture, closed, initial encounter     Lymphocytosis 2009    Neuropathy     diagnosed in the     Sleep apnea     no machine    Thrombocytopenia 2009    low normal current    Type 2 diabetes mellitus        Past Surgical History:   Procedure Laterality Date    BREAST BIOPSY      BREAST LUMPECTOMY Right 2023    Procedure: WIRE LOCALIZED RIGHT LUMPECTOMY;  Surgeon: Joe Ruiz MD;  Location: [facility] MAIN OR;  Service: General;  Laterality: Right;    COLONOSCOPY W/ BIOPSIES  2021    HYSTERECTOMY      in the -increased bleeding  bilateral oophorectomy    SHOULDER SURGERY Left 10/03/2022    and arm    SHOULDER SURGERY      removal of metal plate and screws    TUBAL ABDOMINAL LIGATION      WISDOM TOOTH EXTRACTION           Current Outpatient Medications:     anastrozole (ARIMIDEX) 1 MG tablet, Take 1 tablet by mouth Daily.,  Disp: , Rfl:     atorvastatin (LIPITOR) 40 MG tablet, Take 1 tablet by mouth Every Night., Disp: 90 tablet, Rfl: 1    benazepril (LOTENSIN) 10 MG tablet, Take 1 tablet by mouth Daily., Disp: 90 tablet, Rfl: 3    BIOTIN PO, Take 1 dose by mouth Every Evening. Ld 5/18, Disp: , Rfl:     Calcium Carbonate-Vit D-Min (CALCIUM 1200 PO), Take 1 capsule by mouth Daily., Disp: , Rfl:     Cholecalciferol 1000 units capsule, Take 1 capsule by mouth Every Evening. Ld 5/18, Disp: , Rfl:     CINNAMON PO, Take 1,500 mg by mouth Every Night. Ld 5/18, Disp: , Rfl:     clotrimazole-betamethasone (LOTRISONE) 1-0.05 % cream, Apply 1 Application topically to the appropriate area as directed As Needed., Disp: , Rfl:     Cyanocobalamin (B-12 PO), Take 1,000 mcg by mouth Every Evening. Ld 5/18, Disp: , Rfl:     docusate sodium (COLACE) 100 MG capsule, Take 1 capsule by mouth Daily As Needed. None dos, Disp: , Rfl:     ELDERBERRY PO, Take 2 capsules by mouth Daily., Disp: , Rfl:     glimepiride (AMARYL) 4 MG tablet, TAKE 2 TABLETS BY MOUTH EVERY MORNING AT BREAKFAST., Disp: 180 tablet, Rfl: 3    insulin aspart (NovoLOG FlexPen) 100 UNIT/ML solution pen-injector sc pen, Inject 6 units ac tid plus 2 units if blood sugar >200. MDD 30 units., Disp: 30 mL, Rfl: 3    Insulin Aspart, w/Niacinamide, (Fiasp FlexTouch) 100 UNIT/ML solution pen-injector, Inject 6 units ac tid plus 2 units if blood sugar >200. MDD 30 units., Disp: 30 mL, Rfl: 3    Insulin Pen Needle (1st Tier Unifine Pentips) 32G X 4 MM misc, Inject 1 Piece under the skin into the appropriate area as directed Every Night., Disp: 400 each, Rfl: 3    Omega-3 Fatty Acids (FISH OIL PO), Take 1,200 mg by mouth Every Evening. Ld 5/18, Disp: , Rfl:     ONE TOUCH ULTRA TEST test strip, 1 each by Other route Daily., Disp: , Rfl: 5    pregabalin (LYRICA) 150 MG capsule, Take 1 capsule by mouth 2 (Two) Times a Day., Disp: 180 capsule, Rfl: 1    Tiadylt  MG 24 hr capsule, Take 1 capsule by  mouth Daily., Disp: 90 capsule, Rfl: 3    Tresiba FlexTouch 200 UNIT/ML solution pen-injector pen injection, Inject 20 units daily., Disp: , Rfl:     Allergies   Allergen Reactions    Adhesive Tape Other (See Comments)     Blisters      Bee Venom Hives    Topamax [Topiramate] Hives       Family History   Adopted: Yes   Problem Relation Age of Onset    No Known Problems Other        Cancer-related family history is not on file. She was adopted.    Social History     Tobacco Use    Smoking status: Never     Passive exposure: Never    Smokeless tobacco: Never   Vaping Use    Vaping status: Never Used   Substance Use Topics    Alcohol use: Yes     Comment: rare occasion    Drug use: Never       I have reviewed the history of present illness, past medical history, family history, social history, lab results, current medications, all notes and other records since the patient was last seen on 6/11/2024.    SURVIVORSHIP TREATMENT SUMMARY:     HEALTHCARE PROVIDERS  MEDICAL ONCOLOGIST: Dr. Galileo Oquendo ([place], [place]) phone: [phone]  SURGEON: Dr. Joe Ruiz  RADIATION ONCOLOGIST: Dr. Joe Sow  REFERRING PHYSICIAN: Mercedez Guevara*  OTHER PROVIDERS:  PRIMARY CARE PHYSICIAN: Mercedez Guevara MD    BACKGROUND INFORMATION  Oncology/Hematology History    No history exists.       Allergies as of 07/25/2024 - Reviewed 07/25/2024   Allergen Reaction Noted    Adhesive tape Other (See Comments) 06/17/2020    Bee venom Hives 07/23/2019    Topamax [topiramate] Hives 10/12/2017       Diagnosis:   Cancer Type/Location/Histology Subtype:  DCIS breast        Diagnosis date: 4/11/2023  Stage I []    II    []        III    []          IV  []        Not applicable [x]       TREATMENT  Surgery  Yes [x]   No   []              Surgery date(s): 5/25/2023    Surgical procedure/location on body/findings: Right lumpectomy, residual DCIS completely excised    Radiation:     Yes   [x]    No   []     Body area treated:   Right breast     End date: 7/25/2023    Systemic Therapy (chemotherapy, immunotherapy, hormonal therapy, other)     Yes   [x]   No []  Names of Agents Used: Anastrozole           End date: 7/27/2028    Persistent symptoms of side effects at completion of treatment    Yes  []    No [x]    POTENTIAL SIDE EFFECTS OF ABOVE TREATMENT   - Peripheral neuropathy (numbness and tingling in your fingers or toes)  - Fatigue  - Lymphedema (swelling in your arms)  - Emotional changes (anxiety, depression, worry)  - Memory/concentration problems  - Skin and nail changes  - Cardiac changes  - Pain/tenderness at surgical site    GENETICS:  Familial Cancer Risk Assessment: Patient is adopted  Genetic/hereditary risk factor(s) or predisposing conditions  Genetic Counseling:    Yes   []   No  [x]         Genetic testing results: N/A    FOLLOW UP CARE PLAN  Need for ongoing (adjuvant) treatment for cancer Yes      No   Additional treatment name Start date  Planned duration  Possible side effects    Anastrozole 7/27/2023 5 years Hot flashes, dizziness, fatigue, headache, constipation, diarrhea, GI upset, heartburn, arthralgias, joint swelling, myalgias, pelvic pain, insomnia, cough, sore throat, weight gain, anxiety, sweating, flulike symptoms, dry mouth                 Schedule of clinic visits   Coordinating provider     When/How often     SURVEILLANCE FOR RECURRENCE  Cancer surveillance tests for recurrence or other recommended related tests   Coordinating provider   What/When/How often     GENERAL HEALTH FOLLOW UP   The patient was encouraged to maintain a therapeutic relationship with a primary care physician throughout her lifetime. The patient was instructed to continue all standard non-cancer related health care with her primary care provider, Mercedez Guevara MD. The patient will see their PCP for all general health care recommended for a person their age, including routine cancer  screening testing.The patient was advised to discuss with her primary care physician about when and how frequently to have routine screenings done for other types of cancer.  Additional health monitoring and routine immunizations should be provided under the care of a primary care physician.      RECOMMENDED LIFESTYLE MODIFICATIONS    Discussed NCCN recommendations for all cancer survivors of:   Engage in 150 minutes/week moderate intensity exercise  Achieve and maintain a healthy BMI  Eat a healthy plant-based whole-food diet  Avoid tobacco and second hand smoke  Avoid alcohol or minimize alcohol intake - no more than 1 drink in a day for women, 2 drinks in a day for men  Use sunscreen of at least SPF 30, wear hats and sunglasses   Have routine colonoscopies and prostate or gynecological examinations     SIGNS/SYMPTOMS OF DISEASE RECURRENCE  Any new, persistent or concerning symptoms should be brought to the attention of her provider:  Anything that represents a brand-new symptom  Anything that represents a persistent symptom  Anything the patient is worried about that might be related to the cancer coming back    Cancer survivors may experience issues with the following:   Emotional changes Memory loss  Health insurance  Work   Mental health  Reduced concentration  Parenting Hearing loss    Smoking cessation  Financial advice/assistance Sexual functioning Other   Weight changes  School  Insurance  Other    Physical functioning Fatigue  Fertility  Other   The patient was counseled to speak with her doctors or nurses to find out how to get help with the above issues.     RESOURCES  Below is a list of resources that she may find helpful when it comes to the above listed topics. The patient can also talk to a member of his healthcare team about any questions or concerns she may have so that we can help.      *[employer]: http://www.[place].org/  *American Cancer Society:  https://www.cancer.org/health-care-professionals/national-cancer-survivorship-resource-center/tools-for-cancer-survivors-and-caregivers.html  *Smokefree.gov: https://smokefree.gov/  *Myplate: https://www.choosemyplate.gov/  *[employer]:https://www.livestronSportcut.org/what-we-do/program/livestrong-at-the-ymca  *ASCO: https://www.cancer.net/survivorship    A number of lifestyle/behaviors can affect ongoing health, including the risk for cancer coming back or developing another cancer.  The patient was instructed to discuss these recommendations with her doctor or nurse:   Tobacco use/cessation  Diet  Physical activity    Alcohol use  Sunscreen use  Weight management (gain/loss)     After a review of the Survivorship Treatment Summary & Care Plan, the patient verbalized understanding of recommendations for follow-up. As outlined in the care plan, she was advised to continue with follow-up care in accordance with the NCCN surveillance guidelines while transitioning back to their primary care physician for continued general preventive and healthcare needs. We discussed the importance of healthy diet, exercise, smoking cessation and alcohol use reduction. We reviewed current guidelines for routine screening of other cancers.     A copy of the Survivorship Treatment Summary & Care Plan for Ms. Velez (see below) was provided to and forwarded to the providers identified on the care team.    Advance Care Planning   ADVANCE CARE PLANNING DISCUSSION:  Patient does not have advance care planning complete. Brief discussion and written information provided regarding advance care planning and appropriateness for all healthy adults, choosing a healthcare surrogate, prior experiences with loved ones who have been seriously ill, exploration of goals of care in the event of a sudden injury or illness, and the patient was notified we have a  who can assist with this.    Patient has a advanced care planning document at home that she was  "provided by her PCP.  She states that she is going to be discussing this with her children.  We discussed that she can meet with our licensed clinical  if she has any questions.       SURVIVORSHIP DISCUSSION:   Primary patient goal(s): wellness     Management of disease and treatment related effects: None identified.      OBJECTIVE:    Vitals:    07/25/24 1036   BP: 121/66   Pulse: 61   SpO2: 99%   Weight: 66.7 kg (147 lb)   Height: 154.9 cm (60.98\")   PainSc: 0-No pain     Wt Readings from Last 3 Encounters:   07/25/24 66.7 kg (147 lb)   07/23/24 67.2 kg (148 lb 3.2 oz)   06/11/24 66.4 kg (146 lb 6.4 oz)     Pain Score    07/25/24 1036   PainSc: 0-No pain     Body mass index is 27.79 kg/m².  ECOG  (0) Fully active, able to carry on all predisease performance without restriction    Physical Exam  Vitals reviewed.   Constitutional:       General: She is not in acute distress.     Appearance: Normal appearance.   HENT:      Head: Normocephalic and atraumatic.   Eyes:      Extraocular Movements: Extraocular movements intact.   Pulmonary:      Effort: Pulmonary effort is normal. No respiratory distress.   Musculoskeletal:         General: Normal range of motion.      Cervical back: Normal range of motion.   Neurological:      Mental Status: She is alert and oriented to person, place, and time.   Psychiatric:         Behavior: Behavior normal.         RECENT LABS  WBC   Date Value Ref Range Status   06/11/2024 5.14 3.40 - 10.80 10*3/mm3 Final     RBC   Date Value Ref Range Status   06/11/2024 5.25 3.77 - 5.28 10*6/mm3 Final     Hemoglobin   Date Value Ref Range Status   06/11/2024 14.1 12.0 - 15.9 g/dL Final     Hematocrit   Date Value Ref Range Status   06/11/2024 43.8 34.0 - 46.6 % Final     MCV   Date Value Ref Range Status   06/11/2024 83.4 79.0 - 97.0 fL Final     MCH   Date Value Ref Range Status   06/11/2024 26.9 26.6 - 33.0 pg Final     MCHC   Date Value Ref Range Status   06/11/2024 32.2 31.5 - 35.7 " g/dL Final     RDW   Date Value Ref Range Status   06/11/2024 16.5 (H) 12.3 - 15.4 % Final     RDW-SD   Date Value Ref Range Status   06/11/2024 51.0 37.0 - 54.0 fl Final     MPV   Date Value Ref Range Status   06/11/2024 12.6 (H) 6.0 - 12.0 fL Final     Platelets   Date Value Ref Range Status   06/11/2024 101 (L) 140 - 450 10*3/mm3 Final     Neutrophil %   Date Value Ref Range Status   06/11/2024 43.9 42.7 - 76.0 % Final     Lymphocyte %   Date Value Ref Range Status   06/11/2024 40.5 19.6 - 45.3 % Final     Monocyte %   Date Value Ref Range Status   06/11/2024 10.3 5.0 - 12.0 % Final     Eosinophil %   Date Value Ref Range Status   06/11/2024 4.9 0.3 - 6.2 % Final     Basophil %   Date Value Ref Range Status   06/11/2024 0.4 0.0 - 1.5 % Final     Neutrophils Absolute   Date Value Ref Range Status   03/12/2024 3.2 1.7 - 6.0 x10(3)/ul Final     Neutrophils, Absolute   Date Value Ref Range Status   06/11/2024 2.26 1.70 - 7.00 10*3/mm3 Final     Lymphocytes, Absolute   Date Value Ref Range Status   06/11/2024 2.08 0.70 - 3.10 10*3/mm3 Final     Monocytes, Absolute   Date Value Ref Range Status   06/11/2024 0.53 0.10 - 0.90 10*3/mm3 Final     Eosinophils Absolute   Date Value Ref Range Status   03/12/2024 0.2 0.0 - 0.6 x10(3)/ul Final     Eosinophils, Absolute   Date Value Ref Range Status   06/11/2024 0.25 0.00 - 0.40 10*3/mm3 Final     Basophils Absolute   Date Value Ref Range Status   03/12/2024 0.1 0.0 - 0.3 x10(3)/ul Final     Basophils, Absolute   Date Value Ref Range Status   06/11/2024 0.02 0.00 - 0.20 10*3/mm3 Final     nRBC   Date Value Ref Range Status   10/01/2022 0.1 0.0 - 0.2 /100 WBC Final       Lab Results   Component Value Date    GLUCOSE 239 (H) 06/11/2024    BUN 22 06/11/2024    CREATININE 1.07 (H) 06/11/2024    EGFRIFNONA 56 (L) 02/07/2022    BCR 20.6 06/11/2024    K 4.5 06/11/2024    CO2 24.2 06/11/2024    CALCIUM 9.8 06/11/2024    ALBUMIN 4.2 06/11/2024    LABIL2 1.4 07/05/2018    AST 25  06/11/2024    ALT 31 06/11/2024       DIAGPROB@    Assessment & Plan     Breast neoplasm, Tis (DCIS), right    Aromatase inhibitor use    Encounter for education      ASSESSMENT:  Encounter for survivorship visit   DCIS right breast: ER/NM positive, status post multimodality therapy.  Currently on hormone blockade with anastrozole with plan to continue to July 2028  Annual mammogram due in April 2025  DEXA scan was within normal limits and is next due in August 2025    PLAN:  Return to clinic with Dr. Mir in September 2024 as previously scheduled  Continue calcium and vitamin D supplementation, weightbearing exercise for 20 to 30 minutes most days of the week for bone health while on AI  Continue mammogram as above         No orders of the defined types were placed in this encounter.      I have reviewed labs results, imaging, vitals, and medications with the patient today.

## 2024-07-25 ENCOUNTER — OFFICE VISIT (OUTPATIENT)
Dept: ONCOLOGY | Facility: CLINIC | Age: 74
End: 2024-07-25
Payer: MEDICARE

## 2024-07-25 VITALS
WEIGHT: 147 LBS | DIASTOLIC BLOOD PRESSURE: 66 MMHG | HEIGHT: 61 IN | BODY MASS INDEX: 27.75 KG/M2 | OXYGEN SATURATION: 99 % | SYSTOLIC BLOOD PRESSURE: 121 MMHG | HEART RATE: 61 BPM

## 2024-07-25 DIAGNOSIS — E11.42 TYPE 2 DIABETES MELLITUS WITH DIABETIC POLYNEUROPATHY, WITH LONG-TERM CURRENT USE OF INSULIN: ICD-10-CM

## 2024-07-25 DIAGNOSIS — Z79.4 TYPE 2 DIABETES MELLITUS WITH DIABETIC POLYNEUROPATHY, WITH LONG-TERM CURRENT USE OF INSULIN: ICD-10-CM

## 2024-07-25 DIAGNOSIS — Z79.811 AROMATASE INHIBITOR USE: ICD-10-CM

## 2024-07-25 DIAGNOSIS — D05.11 BREAST NEOPLASM, TIS (DCIS), RIGHT: Primary | ICD-10-CM

## 2024-07-25 DIAGNOSIS — Z71.9 ENCOUNTER FOR EDUCATION: ICD-10-CM

## 2024-07-25 RX ORDER — GLIMEPIRIDE 4 MG/1
TABLET ORAL
Qty: 180 TABLET | Refills: 3 | Status: SHIPPED | OUTPATIENT
Start: 2024-07-25

## 2024-07-29 DIAGNOSIS — E78.2 MIXED HYPERLIPIDEMIA: ICD-10-CM

## 2024-07-29 DIAGNOSIS — I10 PRIMARY HYPERTENSION: ICD-10-CM

## 2024-07-29 RX ORDER — ATORVASTATIN CALCIUM 40 MG/1
40 TABLET, FILM COATED ORAL DAILY
Qty: 90 TABLET | Refills: 3 | Status: SHIPPED | OUTPATIENT
Start: 2024-07-29

## 2024-07-29 RX ORDER — BENAZEPRIL HYDROCHLORIDE 5 MG/1
5 TABLET ORAL DAILY
Qty: 90 TABLET | Refills: 3 | OUTPATIENT
Start: 2024-07-29

## 2024-09-04 DIAGNOSIS — Z79.811 AROMATASE INHIBITOR USE: ICD-10-CM

## 2024-09-04 DIAGNOSIS — D05.11 BREAST NEOPLASM, TIS (DCIS), RIGHT: ICD-10-CM

## 2024-09-04 DIAGNOSIS — D69.6 THROMBOCYTOPENIA: Primary | ICD-10-CM

## 2024-09-05 DIAGNOSIS — I10 PRIMARY HYPERTENSION: ICD-10-CM

## 2024-09-06 RX ORDER — BENAZEPRIL HYDROCHLORIDE 10 MG/1
10 TABLET ORAL DAILY
OUTPATIENT
Start: 2024-09-06

## 2024-09-06 RX ORDER — BENAZEPRIL HYDROCHLORIDE 10 MG/1
10 TABLET ORAL DAILY
Qty: 90 TABLET | Refills: 3 | Status: SHIPPED | OUTPATIENT
Start: 2024-09-06

## 2024-09-06 NOTE — PROGRESS NOTES
"                         HEMATOLOGY ONCOLOGY OUTPATIENT FOLLOWUP       Patient name: Gi Velez  : 1950  MRN: [id number]  Primary Care Physician: Mercedez Guevara MD  Referring Physician: No ref. provider found  Reason For Consult: ITP, B12 low, anemia, DCIS      History of Present Illness:  Patient is a 74 y.o. female with history of immune thrombocytopenia, B12 deficiency, anemia, DCIS.    2009 patient had a initial bone marrow biopsy which showed normocellular bone marrow with low iron stores, she had a low B12 level at 289, at that point her hemoglobin was 13, platelet count was 91,000  She has been on supplementation with B12 orally    2024 WBC 7.18, hemoglobin 14.5, hematocrit 43.9, MCV 79.4, platelet count 90,    Patient also has a history of DCIS  2023 stereotactic biopsy of the right breast with pathology revealing hide grade ductal carcinoma in situ, ER/MI positive  2023 patient had breast lumpectomy final path with residual DCIS, completely excised  2023 patient completed radiation treatment 16 fractions of 266 cGy per fraction  2023 patient started on anastrozole 1 mg daily along with calcium and vitamin D 600 mg daily  23 - DEXA  normal.    3/2024 mammogram benign.    2024: Has been feeling reasonably well.  Complains only of fatigue that she attributes to her \"blood disorder\".  She seemed to believe that her hemoglobin and hematocrit were abnormal but they have not been for some time.  She does persist with thrombocytopenia that has been investigated before.  She has been eating well and maintains a good appetite.  Her weight has been stable.  She has been afebrile.  Complains of back pain that is a chronic problem that has not changed recently.  She has no other deep bone pains.  No dyspnea.  Her chest pains have been occasional but have been investigated by cardiology.  No abdominal pain or diarrhea and no dysuria.  On exam alert and " conversant.  In no distress.  The lungs are clear bilaterally.  No palpable lymphadenopathy in the neck or axillary spaces.  The lungs are clear and the abdomen soft.  No edema.  Laboratory exams reviewed and discussed with her.  To see me in approximately 4 months.  Continue with the same medication.  Sent new prescriptions.  Mammogram and DEXA scan in 2025.    Past Medical History:   Diagnosis Date    Allergies     Breast cancer     Diabetes mellitus     diagnosed in the 1990's    Drug therapy     Dry skin     feet    Hx of radiation therapy     Hyperlipidemia 2000    Hypertension 2018    Insomnia     Irregular heart beat 2005    rare episode  no need to follow with cardiology    Left supracondylar humerus fracture, closed, initial encounter     Lymphocytosis 12/2009    Neuropathy     diagnosed in the 1990's    Sleep apnea     no machine    Thrombocytopenia 12/2009    low normal current    Type 2 diabetes mellitus 1998     Past Surgical History:   Procedure Laterality Date    BREAST BIOPSY      BREAST LUMPECTOMY Right 05/25/2023    Procedure: WIRE LOCALIZED RIGHT LUMPECTOMY;  Surgeon: Joe Ruiz MD;  Location: [facility] OR;  Service: General;  Laterality: Right;    COLONOSCOPY W/ BIOPSIES  07/2021    HYSTERECTOMY      in the 1980's-increased bleeding  bilateral oophorectomy    SHOULDER SURGERY Left 10/03/2022    and arm    SHOULDER SURGERY  2024    removal of metal plate and screws    TUBAL ABDOMINAL LIGATION      WISDOM TOOTH EXTRACTION         Current Outpatient Medications:     anastrozole (ARIMIDEX) 1 MG tablet, Take 1 tablet by mouth Daily., Disp: , Rfl:     atorvastatin (LIPITOR) 40 MG tablet, TAKE 1 TABLET BY MOUTH EVERY DAY, Disp: 90 tablet, Rfl: 3    benazepril (LOTENSIN) 10 MG tablet, Take 1 tablet by mouth Daily., Disp: 90 tablet, Rfl: 3    BIOTIN PO, Take 1 dose by mouth Every Evening. Ld 5/18, Disp: , Rfl:     Calcium Carbonate-Vit D-Min (CALCIUM 1200 PO), Take 1 capsule by mouth  Daily., Disp: , Rfl:     Cholecalciferol 1000 units capsule, Take 1 capsule by mouth Every Evening. Ld 5/18, Disp: , Rfl:     CINNAMON PO, Take 1,500 mg by mouth Every Night. Ld 5/18, Disp: , Rfl:     clotrimazole-betamethasone (LOTRISONE) 1-0.05 % cream, Apply 1 Application topically to the appropriate area as directed As Needed., Disp: , Rfl:     Cyanocobalamin (B-12 PO), Take 1,000 mcg by mouth Every Evening. Ld 5/18, Disp: , Rfl:     docusate sodium (COLACE) 100 MG capsule, Take 1 capsule by mouth Daily As Needed. None dos, Disp: , Rfl:     ELDERBERRY PO, Take 2 capsules by mouth Daily., Disp: , Rfl:     glimepiride (AMARYL) 4 MG tablet, TAKE 2 TABLETS BY MOUTH EVERY MORNING AT BREAKFAST., Disp: 180 tablet, Rfl: 3    insulin aspart (NovoLOG FlexPen) 100 UNIT/ML solution pen-injector sc pen, Inject 6 units ac tid plus 2 units if blood sugar >200. MDD 30 units., Disp: 30 mL, Rfl: 3    Insulin Aspart, w/Niacinamide, (Fiasp FlexTouch) 100 UNIT/ML solution pen-injector, Inject 6 units ac tid plus 2 units if blood sugar >200. MDD 30 units., Disp: 30 mL, Rfl: 3    Insulin Pen Needle (1st Tier Unifine Pentips) 32G X 4 MM misc, Inject 1 Piece under the skin into the appropriate area as directed Every Night., Disp: 400 each, Rfl: 3    Omega-3 Fatty Acids (FISH OIL PO), Take 1,200 mg by mouth Every Evening. Ld 5/18, Disp: , Rfl:     ONE TOUCH ULTRA TEST test strip, 1 each by Other route Daily., Disp: , Rfl: 5    pregabalin (LYRICA) 150 MG capsule, Take 1 capsule by mouth 2 (Two) Times a Day., Disp: 180 capsule, Rfl: 1    Tiadylt  MG 24 hr capsule, Take 1 capsule by mouth Daily., Disp: 90 capsule, Rfl: 3    Tresiba FlexTouch 200 UNIT/ML solution pen-injector pen injection, Inject 20 units daily., Disp: , Rfl:     Allergies   Allergen Reactions    Adhesive Tape Other (See Comments)     Blisters      Bee Venom Hives    Topamax [Topiramate] Hives     Family History   Adopted: Yes   Problem Relation Age of Onset    No  Known Problems Other      Cancer-related family history is not on file. She was adopted.    Social History     Tobacco Use    Smoking status: Never     Passive exposure: Never    Smokeless tobacco: Never   Vaping Use    Vaping status: Never Used   Substance Use Topics    Alcohol use: Yes     Comment: rare occasion    Drug use: Never     Social History     Social History Narrative    Not on file     ROS:   Review of Systems   Constitutional:  Positive for fatigue. Negative for fever.   HENT:  Negative for congestion and nosebleeds.    Eyes:  Negative for pain and itching.   Respiratory:  Negative for cough and shortness of breath.    Cardiovascular:  Negative for chest pain.   Gastrointestinal:  Negative for abdominal pain, blood in stool, diarrhea, nausea and vomiting.   Endocrine: Negative for cold intolerance and heat intolerance.   Genitourinary:  Negative for difficulty urinating.   Musculoskeletal:  Negative for arthralgias.   Skin:  Negative for rash.   Neurological:  Negative for dizziness and headaches.   Hematological:  Does not bruise/bleed easily.   Psychiatric/Behavioral:  Negative for behavioral problems.      Objective:    Vital Signs:  There were no vitals filed for this visit.  There is no height or weight on file to calculate BMI.    ECOG  (0) Fully active, able to carry on all predisease performance without restriction    Physical Exam:   Physical Exam  Constitutional:       Appearance: Normal appearance.   HENT:      Head: Normocephalic and atraumatic.   Eyes:      Pupils: Pupils are equal, round, and reactive to light.   Cardiovascular:      Rate and Rhythm: Normal rate and regular rhythm.      Pulses: Normal pulses.      Heart sounds: No murmur heard.  Pulmonary:      Effort: Pulmonary effort is normal.      Breath sounds: Normal breath sounds.   Abdominal:      General: There is no distension.      Palpations: Abdomen is soft. There is no mass.      Tenderness: There is no abdominal tenderness.    Musculoskeletal:         General: Normal range of motion.      Cervical back: Normal range of motion and neck supple.   Skin:     General: Skin is warm.   Neurological:      General: No focal deficit present.      Mental Status: She is alert.   Psychiatric:         Mood and Affect: Mood normal.   RENETTA Mir MD performed the physical exam on 9/11/2024 as documented above.    Lab Results - Last 18 Months   Lab Units 06/11/24  0931 02/27/24  0826 05/15/23  0836   WBC 10*3/mm3 5.14 7.18 7.30   HEMOGLOBIN g/dL 14.1 14.5 14.0   HEMATOCRIT % 43.8 43.9 42.9   PLATELETS 10*3/mm3 101* 90* 126*   MCV fL 83.4 79.4 80.5     Lab Results - Last 18 Months   Lab Units 06/11/24  0931 05/21/24  0808 10/10/23  0745   SODIUM mmol/L 142 138 142   POTASSIUM mmol/L 4.5 4.5 4.4   CHLORIDE mmol/L 106 102 104   CO2 mmol/L 24.2 25.0 26.3   BUN mg/dL 22 24* 15   CREATININE mg/dL 1.07* 1.23* 0.99   CALCIUM mg/dL 9.8 9.6 10.1   BILIRUBIN mg/dL 0.3 0.4 0.3   ALK PHOS U/L 102 99 94   ALT (SGPT) U/L 31 49* 20   AST (SGOT) U/L 25 31 22   GLUCOSE mg/dL 239* 176* 138*     Lab Results   Component Value Date    GLUCOSE 239 (H) 06/11/2024    BUN 22 06/11/2024    CREATININE 1.07 (H) 06/11/2024    EGFRIFNONA 56 (L) 02/07/2022    BCR 20.6 06/11/2024    K 4.5 06/11/2024    CO2 24.2 06/11/2024    CALCIUM 9.8 06/11/2024    ALBUMIN 4.2 06/11/2024    LABIL2 1.4 07/05/2018    AST 25 06/11/2024    ALT 31 06/11/2024     Lab Results - Last 18 Months   Lab Units 05/25/23  0733   INR  0.97   APTT seconds 25.4     Lab Results   Component Value Date    IRON 117 04/04/2017    TIBC 504 (H) 04/04/2017    FERRITIN 46 11/02/2017     Lab Results   Component Value Date    FOLATE >24.8 (H) 04/04/2017     Lab Results   Component Value Date    NXGMETOI21 >2,000 (H) 09/30/2022     Lab Results   Component Value Date    ALESSANDRO  04/04/2017     NEGATIVE This result is designed to aid in the diagnosis of many of the    ALESSANDRO  04/04/2017     systemic autoimmune disorders and is not  diagnostic by itself.  Test results    ALESSANDRO  04/04/2017     should be interpreted in conjunction with the clinical evaluation of the    ALESSANDRO  04/04/2017     patient.  SLE patients undergoing steroid therapy may have negative results.     Lab Results   Component Value Date    PTT 25.4 05/25/2023    INR 0.97 05/25/2023     Assessment & Plan     1.  Ductal carcinoma in situ, estrogen and progesterone receptor positive.  Underwent surgery followed by adjuvant radiation.  On adjuvant treatment with anastrozole which she will continue through July 2028.  Reviewed the records and discussed with her.  2.  Chronic immune thrombocytopenic purpura: No change.  No intervention at this time.  I do not believe that her fatigue has any connection with this.  3.  Mammogram and DEXA scan in 2025.  4.  See me in approximately 4 months.  Sent new prescriptions to her pharmacy.    Mickey Mir MD on 9/11/2024 at 9:36 AM.

## 2024-09-06 NOTE — TELEPHONE ENCOUNTER
Patient confirmed she has been taking 10 mg and her most recent blood pressure reading from July was 128/59

## 2024-09-11 ENCOUNTER — LAB (OUTPATIENT)
Dept: LAB | Facility: HOSPITAL | Age: 74
End: 2024-09-11
Payer: MEDICARE

## 2024-09-11 ENCOUNTER — OFFICE VISIT (OUTPATIENT)
Dept: ONCOLOGY | Facility: CLINIC | Age: 74
End: 2024-09-11
Payer: MEDICARE

## 2024-09-11 VITALS
OXYGEN SATURATION: 98 % | TEMPERATURE: 98.2 F | HEIGHT: 61 IN | WEIGHT: 149.2 LBS | DIASTOLIC BLOOD PRESSURE: 67 MMHG | SYSTOLIC BLOOD PRESSURE: 139 MMHG | BODY MASS INDEX: 28.17 KG/M2 | RESPIRATION RATE: 18 BRPM | HEART RATE: 74 BPM

## 2024-09-11 DIAGNOSIS — Z79.811 AROMATASE INHIBITOR USE: ICD-10-CM

## 2024-09-11 DIAGNOSIS — D69.6 THROMBOCYTOPENIA: Primary | ICD-10-CM

## 2024-09-11 DIAGNOSIS — D05.11 BREAST NEOPLASM, TIS (DCIS), RIGHT: Primary | ICD-10-CM

## 2024-09-11 DIAGNOSIS — D05.11 BREAST NEOPLASM, TIS (DCIS), RIGHT: ICD-10-CM

## 2024-09-11 LAB
ALBUMIN SERPL-MCNC: 4.3 G/DL (ref 3.5–5.2)
ALBUMIN/GLOB SERPL: 1.8 G/DL
ALP SERPL-CCNC: 99 U/L (ref 39–117)
ALT SERPL W P-5'-P-CCNC: 55 U/L (ref 1–33)
ANION GAP SERPL CALCULATED.3IONS-SCNC: 10.8 MMOL/L (ref 5–15)
ANION GAP SERPL CALCULATED.3IONS-SCNC: 12.4 MMOL/L (ref 5–15)
AST SERPL-CCNC: 32 U/L (ref 1–32)
BASOPHILS # BLD AUTO: 0.04 10*3/MM3 (ref 0–0.2)
BASOPHILS NFR BLD AUTO: 0.7 % (ref 0–1.5)
BILIRUB SERPL-MCNC: 0.4 MG/DL (ref 0–1.2)
BUN SERPL-MCNC: 25 MG/DL (ref 8–23)
BUN SERPL-MCNC: 26 MG/DL (ref 8–23)
BUN/CREAT SERPL: 20.3 (ref 7–25)
BUN/CREAT SERPL: 20.3 (ref 7–25)
CALCIUM SPEC-SCNC: 9.6 MG/DL (ref 8.6–10.5)
CALCIUM SPEC-SCNC: 9.7 MG/DL (ref 8.6–10.5)
CHLORIDE SERPL-SCNC: 106 MMOL/L (ref 98–107)
CHLORIDE SERPL-SCNC: 107 MMOL/L (ref 98–107)
CO2 SERPL-SCNC: 24.6 MMOL/L (ref 22–29)
CO2 SERPL-SCNC: 25.2 MMOL/L (ref 22–29)
CREAT SERPL-MCNC: 1.23 MG/DL (ref 0.57–1)
CREAT SERPL-MCNC: 1.28 MG/DL (ref 0.57–1)
DEPRECATED RDW RBC AUTO: 52.4 FL (ref 37–54)
EGFRCR SERPLBLD CKD-EPI 2021: 43.5 ML/MIN/1.73
EGFRCR SERPLBLD CKD-EPI 2021: 45.6 ML/MIN/1.73
EOSINOPHIL # BLD AUTO: 0.23 10*3/MM3 (ref 0–0.4)
EOSINOPHIL NFR BLD AUTO: 4.1 % (ref 0.3–6.2)
ERYTHROCYTE [DISTWIDTH] IN BLOOD BY AUTOMATED COUNT: 17.1 % (ref 12.3–15.4)
GLOBULIN UR ELPH-MCNC: 2.4 GM/DL
GLUCOSE SERPL-MCNC: 215 MG/DL (ref 65–99)
GLUCOSE SERPL-MCNC: 215 MG/DL (ref 65–99)
HCT VFR BLD AUTO: 42.8 % (ref 34–46.6)
HGB BLD-MCNC: 13.6 G/DL (ref 12–15.9)
HOLD SPECIMEN: NORMAL
LYMPHOCYTES # BLD AUTO: 2.48 10*3/MM3 (ref 0.7–3.1)
LYMPHOCYTES NFR BLD AUTO: 44.1 % (ref 19.6–45.3)
MCH RBC QN AUTO: 27.3 PG (ref 26.6–33)
MCHC RBC AUTO-ENTMCNC: 31.8 G/DL (ref 31.5–35.7)
MCV RBC AUTO: 85.9 FL (ref 79–97)
MONOCYTES # BLD AUTO: 0.55 10*3/MM3 (ref 0.1–0.9)
MONOCYTES NFR BLD AUTO: 9.8 % (ref 5–12)
NEUTROPHILS NFR BLD AUTO: 2.32 10*3/MM3 (ref 1.7–7)
NEUTROPHILS NFR BLD AUTO: 41.3 % (ref 42.7–76)
PLATELET # BLD AUTO: 101 10*3/MM3 (ref 140–450)
POTASSIUM SERPL-SCNC: 4.1 MMOL/L (ref 3.5–5.2)
POTASSIUM SERPL-SCNC: 4.1 MMOL/L (ref 3.5–5.2)
PROT SERPL-MCNC: 6.7 G/DL (ref 6–8.5)
RBC # BLD AUTO: 4.98 10*6/MM3 (ref 3.77–5.28)
SODIUM SERPL-SCNC: 143 MMOL/L (ref 136–145)
SODIUM SERPL-SCNC: 143 MMOL/L (ref 136–145)
WBC NRBC COR # BLD AUTO: 5.62 10*3/MM3 (ref 3.4–10.8)

## 2024-09-11 PROCEDURE — 99213 OFFICE O/P EST LOW 20 MIN: CPT | Performed by: INTERNAL MEDICINE

## 2024-09-11 PROCEDURE — 85025 COMPLETE CBC W/AUTO DIFF WBC: CPT

## 2024-09-11 PROCEDURE — 3078F DIAST BP <80 MM HG: CPT | Performed by: INTERNAL MEDICINE

## 2024-09-11 PROCEDURE — 80053 COMPREHEN METABOLIC PANEL: CPT | Performed by: INTERNAL MEDICINE

## 2024-09-11 PROCEDURE — 1159F MED LIST DOCD IN RCRD: CPT | Performed by: INTERNAL MEDICINE

## 2024-09-11 PROCEDURE — 36415 COLL VENOUS BLD VENIPUNCTURE: CPT

## 2024-09-11 PROCEDURE — 3075F SYST BP GE 130 - 139MM HG: CPT | Performed by: INTERNAL MEDICINE

## 2024-09-11 PROCEDURE — 1160F RVW MEDS BY RX/DR IN RCRD: CPT | Performed by: INTERNAL MEDICINE

## 2024-09-11 PROCEDURE — 1126F AMNT PAIN NOTED NONE PRSNT: CPT | Performed by: INTERNAL MEDICINE

## 2024-09-11 RX ORDER — ANASTROZOLE 1 MG/1
1 TABLET ORAL DAILY
Qty: 90 TABLET | Refills: 3 | Status: SHIPPED | OUTPATIENT
Start: 2024-09-11

## 2024-10-02 ENCOUNTER — TELEPHONE (OUTPATIENT)
Dept: SURGERY | Facility: CLINIC | Age: 74
End: 2024-10-02

## 2024-10-02 NOTE — TELEPHONE ENCOUNTER
Hub staff attempted to follow warm transfer process and was unsuccessful     Caller: Gi Velez     Relationship to patient: SELF     Best call back number: [phone]     Patient is needing:  RETURNING CALL TO RESCHEDULE APPOINTMENT WITH DR THOMASON

## 2024-10-07 DIAGNOSIS — E11.42 TYPE 2 DIABETES MELLITUS WITH DIABETIC POLYNEUROPATHY, WITH LONG-TERM CURRENT USE OF INSULIN: ICD-10-CM

## 2024-10-07 DIAGNOSIS — Z79.4 TYPE 2 DIABETES MELLITUS WITH DIABETIC POLYNEUROPATHY, WITH LONG-TERM CURRENT USE OF INSULIN: ICD-10-CM

## 2024-10-07 RX ORDER — INSULIN DEGLUDEC 200 U/ML
INJECTION, SOLUTION SUBCUTANEOUS
Qty: 9 ML | Refills: 3 | Status: SHIPPED | OUTPATIENT
Start: 2024-10-07

## 2024-10-21 NOTE — PROGRESS NOTES
Subjective   The ABCs of the Annual Wellness Visit  Medicare Wellness Visit      Gi Velez is a 74 y.o. patient who presents for a Medicare Wellness Visit.    The following portions of the patient's history were reviewed and   updated as appropriate: allergies, current medications, past family history, past medical history, past social history, past surgical history, and problem list.    Compared to one year ago, the patient's physical   health is the same.  Compared to one year ago, the patient's mental   health is the same.    Recent Hospitalizations:  She was not admitted to the hospital during the last year.     Current Medical Providers:  Patient Care Team:  Mercedez Guevara MD as PCP - General (Family Medicine)  Joe Ruiz MD as Surgeon (General Surgery)  Vic Cerna MD as Consulting Physician (Endocrinology)  Monty Nation MD as Consulting Physician (Ophthalmology)  Yonatan Fernandez Jr., DPM as Consulting Physician (Podiatry)  Uriel Montenegro MD as Consulting Physician (Orthopedic Surgery)  Elias Coles MD as Consulting Physician (Radiation Oncology)  Jeffy Noyola MD as Consulting Physician (Cardiology)  Mickey Mir MD as Consulting Physician (Hematology and Oncology)    Outpatient Medications Prior to Visit   Medication Sig Dispense Refill    anastrozole (ARIMIDEX) 1 MG tablet Take 1 tablet by mouth Daily. 90 tablet 3    atorvastatin (LIPITOR) 40 MG tablet TAKE 1 TABLET BY MOUTH EVERY DAY 90 tablet 3    benazepril (LOTENSIN) 10 MG tablet Take 1 tablet by mouth Daily. 90 tablet 3    BIOTIN PO Take 1 dose by mouth Every Evening. Ld 5/18      Calcium Carbonate-Vit D-Min (CALCIUM 1200 PO) Take 1 capsule by mouth Daily.      Cholecalciferol 1000 units capsule Take 1 capsule by mouth Every Evening. Ld 5/18      CINNAMON PO Take 1,500 mg by mouth Every Night. Ld 5/18      clotrimazole-betamethasone (LOTRISONE) 1-0.05 % cream Apply 1 Application topically to the  appropriate area as directed As Needed.      Cyanocobalamin (B-12 PO) Take 1,000 mcg by mouth Every Evening. Ld 5/18      docusate sodium (COLACE) 100 MG capsule Take 1 capsule by mouth Daily As Needed. None dos      ELDERBERRY PO Take 2 capsules by mouth Daily.      glimepiride (AMARYL) 4 MG tablet TAKE 2 TABLETS BY MOUTH EVERY MORNING AT BREAKFAST. 180 tablet 3    insulin aspart (NovoLOG FlexPen) 100 UNIT/ML solution pen-injector sc pen Inject 6 units ac tid plus 2 units if blood sugar >200. MDD 30 units. 30 mL 3    Insulin Aspart, w/Niacinamide, (Fiasp FlexTouch) 100 UNIT/ML solution pen-injector Inject 6 units ac tid plus 2 units if blood sugar >200. MDD 30 units. 30 mL 3    Insulin Pen Needle (1st Tier Unifine Pentips) 32G X 4 MM misc Inject 1 Piece under the skin into the appropriate area as directed Every Night. 400 each 3    Omega-3 Fatty Acids (FISH OIL PO) Take 1,200 mg by mouth Every Evening. Ld 5/18      ONE TOUCH ULTRA TEST test strip 1 each by Other route Daily.  5    Tiadylt  MG 24 hr capsule Take 1 capsule by mouth Daily. 90 capsule 3    Tresiba FlexTouch 200 UNIT/ML solution pen-injector pen injection INJECT 22 UNITS UNDER THE SKIN INTO THE APPROPRIATE AREA AS DIRECTED DAILY. NONE PREOP 9 mL 3    pregabalin (LYRICA) 150 MG capsule Take 1 capsule by mouth 2 (Two) Times a Day. 180 capsule 1     No facility-administered medications prior to visit.     No opioid medication identified on active medication list. I have reviewed chart for other potential  high risk medication/s and harmful drug interactions in the elderly.      Aspirin is not on active medication list.  Aspirin use is not indicated based on review of current medical condition/s. Risk of harm outweighs potential benefits.  .    Patient Active Problem List   Diagnosis    Thrombocytopenia    Acute ITP    Vitamin B12 deficiency    Iron deficiency    Vitamin D deficiency    Obstructive sleep apnea    Hyperlipidemia    Goiter    Encounter  "for long-term (current) use of other medications    Diabetic peripheral neuropathy    Type 2 diabetes mellitus with diabetic polyneuropathy, with long-term current use of insulin    Nuclear cataract    Glaucoma suspect    Epiretinal membrane    Chronic fatigue    Insomnia    Overweight (BMI 25.0-29.9)    Initial Medicare annual wellness visit    Need for pneumococcal vaccination    Need for influenza vaccination    Closed fracture of left proximal humerus    Breast neoplasm, Tis (DCIS), right    Primary hypertension    Tachycardia    Medicare annual wellness visit, subsequent    History of incision and drainage    Wound drainage     Advance Care Planning Advance Directive is not on file.  ACP discussion was held with the patient during this visit. Patient does not have an advance directive, information provided.            Objective   Vitals:    10/22/24 1025   BP: 132/82   BP Location: Right arm   Patient Position: Sitting   Cuff Size: Adult   Pulse: 83   Resp: 18   Temp: 97.8 °F (36.6 °C)   TempSrc: Temporal   SpO2: 96%   Weight: 68.8 kg (151 lb 9.6 oz)   Height: 156.2 cm (61.5\")   PainSc: 0-No pain       Estimated body mass index is 28.18 kg/m² as calculated from the following:    Height as of this encounter: 156.2 cm (61.5\").    Weight as of this encounter: 68.8 kg (151 lb 9.6 oz).    BMI is >= 25 and <30. (Overweight) The following options were offered after discussion;: exercise counseling/recommendations and nutrition counseling/recommendations     ATTENTION  What is the year: correct  What is the month of the year: correct  What is the day of the week?: correct  What is the date?: incorrect  MEMORY  Repeat address three times, only score third attempt: Alexys Matthews [address] [place] [address]: 6  HOW MANY ANIMALS DID THE PATIENT NAME  Verbal Fluency -- Animal Names (0-25): 17-21  CLOCK DRAWING  Clock Drawing: All Correct  MEMORY RECALL  Tell me what you remember about that name and address we were " repeating at the beginnin  ACE TOTAL SCORE  Total ACE Score - <25/30 strongly suggests cognitive impairment; <21/30 almost certainly shows dementia: 26    Does the patient have evidence of cognitive impairment? No                                                                                                Health  Risk Assessment    Smoking Status:  Social History     Tobacco Use   Smoking Status Never    Passive exposure: Never   Smokeless Tobacco Never     Alcohol Consumption:  Social History     Substance and Sexual Activity   Alcohol Use Yes    Comment: rare occasion       Fall Risk Screen  DONADI Fall Risk Assessment was completed, and patient is at LOW risk for falls.Assessment completed on:10/22/2024    Depression Screening:      10/22/2024    10:27 AM   PHQ-2/PHQ-9 Depression Screening   Little interest or pleasure in doing things Not at all   Feeling down, depressed, or hopeless Not at all     Health Habits and Functional and Cognitive Screening:      10/22/2024    10:27 AM   Functional & Cognitive Status   Do you have difficulty preparing food and eating? No   Do you have difficulty bathing yourself, getting dressed or grooming yourself? No   Do you have difficulty using the toilet? No   Do you have difficulty moving around from place to place? No   Do you have trouble with steps or getting out of a bed or a chair? No   Current Diet Well Balanced Diet   Dental Exam Not up to date   Eye Exam Up to date   Exercise (times per week) 3 times per week   Current Exercises Include Walking   Do you need help using the phone?  No   Are you deaf or do you have serious difficulty hearing?  No   Do you need help to go to places out of walking distance? No   Do you need help shopping? No   Do you need help preparing meals?  No   Do you need help with housework?  No   Do you need help with laundry? No   Do you need help taking your medications? No   Do you need help managing money? No   Do you ever drive or ride in  a car without wearing a seat belt? No   Have you felt unusual stress, anger or loneliness in the last month? No   Who do you live with? Alone   If you need help, do you have trouble finding someone available to you? No   Have you been bothered in the last four weeks by sexual problems? No   Do you have difficulty concentrating, remembering or making decisions? No           Age-appropriate Screening Schedule:  Refer to the list below for future screening recommendations based on patient's age, sex and/or medical conditions. Orders for these recommended tests are listed in the plan section. The patient has been provided with a written plan.    Health Maintenance List  Health Maintenance   Topic Date Due    LIPID PANEL  10/10/2024    URINE MICROALBUMIN  10/13/2024    BMI FOLLOWUP  10/18/2024    DIABETIC EYE EXAM  11/17/2024    HEMOGLOBIN A1C  11/21/2024    COVID-19 Vaccine (8 - 2023-24 season) 12/17/2024    MAMMOGRAM  03/29/2025    DXA SCAN  08/28/2025    ANNUAL WELLNESS VISIT  10/22/2025    TDAP/TD VACCINES (2 - Td or Tdap) 08/15/2029    COLORECTAL CANCER SCREENING  07/21/2031    HEPATITIS C SCREENING  Completed    RSV Vaccine - Adults  Completed    INFLUENZA VACCINE  Completed    Pneumococcal Vaccine 65+  Completed    ZOSTER VACCINE  Completed                                                                                                                                                CMS Preventative Services Quick Reference  Risk Factors Identified During Encounter  See assessment and plan    The above risks/problems have been discussed with the patient.  Pertinent information has been shared with the patient in the After Visit Summary.  An After Visit Summary and PPPS were made available to the patient.    Follow Up:   Next Medicare Wellness visit to be scheduled in 1 year.         Additional E&M Note during same encounter follows:  Patient has additional, significant, and separately identifiable  condition(s)/problem(s) that require work above and beyond the Medicare Wellness Visit     Chief Complaint  Medicare Wellness-subsequent and Hypertension    Subjective    HPI  Gi is also being seen today for additional medical problem/s.    Hypertension  Patient does check blood pressure at home.   Home readings range from 112/58 to 122/55 per patient/patient submitted blood pressure diary.  Patient denies  blurred vision, chest pain, dyspnea, headache, neck aches, orthopnea, palpitations, paroxysmal nocturnal dyspnea, peripheral edema, pulsating in the ears, and tiredness/fatigue   Patient reports they are taking medications as prescribed and they are not having side effects.       The patient is a 74-year-old female presenting for a Medicare wellness visit and follow-up on hypertension, peripheral diabetic neuropathy, and other chronic medical conditions.    She has a history of diabetes managed by Dr. Patterson and undergoes diabetic eye exams with Dr. Nation at the [facility]. Her last eye exam was on 11/17/2023, and her next scheduled exam is in 11/2024. She uses a continuous glucose monitor on her arm.    She has a history of right breast cancer and thrombocytopenia. She was previously followed by Dr. William, then transferred care to Dr. Oquendo in 02/2024, and has now established care with Dr. Stone on 09/11/2024. She has a follow-up appointment with Dr. Stone on 01/15/2025. Dr. Stone plans to continue adjunctive treatment with anastrozole through 07/2028. She will have a mammogram and DEXA scan in 2025. She also has a follow-up with Dr. Joe Ruiz for breast surgery on 12/03/2024 and with her radiology oncologist, Dr. Coles, who has been monitoring her for 2 years.    She requires a refill of her pregabalin prescription, which she takes twice daily for peripheral neuropathy. This medication has been more effective than others she has tried. She experiences cramping and pain,  "primarily in her feet, and occasionally in her legs and big toe. She has been attempting to walk more, managing 15 to 20 minutes several times a week. She reports no shortness of breath or chest pain.    She is currently taking diltiazem 120 mg for blood pressure and irregular heartbeat. She has an appointment with Dr. Cerna in the first week of December 2024. She is not on any medication for her thrombocytopenia but takes vitamin B12 1000 mcg daily.    She experiences shoulder pain, a residual effect from a previous fracture. She was advised against taking ibuprofen by her hematologist. She was prescribed hydrocodone for her fracture but discontinued it due to constipation. She has used lidocaine patches for her feet in the past, which were effective.    She has an appointment with her eye doctor tomorrow.    Supplemental Information:  She had a hysterectomy.    IMMUNIZATIONS  She had COVID-19 vaccine in 02/2024. She had hepatitis B vaccine 20 years ago.          Objective   Vital Signs:  /82 (BP Location: Right arm, Patient Position: Sitting, Cuff Size: Adult)   Pulse 83   Temp 97.8 °F (36.6 °C) (Temporal)   Resp 18   Ht 156.2 cm (61.5\")   Wt 68.8 kg (151 lb 9.6 oz)   SpO2 96%   BMI 28.18 kg/m²   Physical Exam  Vitals and nursing note reviewed.   Constitutional:       General: She is not in acute distress.     Appearance: She is well-developed.   HENT:      Head: Normocephalic and atraumatic.   Cardiovascular:      Rate and Rhythm: Normal rate and regular rhythm.      Heart sounds: No murmur heard.  Pulmonary:      Effort: Pulmonary effort is normal.      Breath sounds: Normal breath sounds. No wheezing.   Musculoskeletal:         General: Normal range of motion.   Skin:     General: Skin is warm and dry.      Findings: No rash.   Neurological:      Mental Status: She is alert and oriented to person, place, and time.                        Assessment and Plan       Diagnoses and all orders for " this visit:    1. Medicare annual wellness visit, subsequent (Primary)    2. Primary hypertension    3. Diabetic peripheral neuropathy  -     pregabalin (LYRICA) 150 MG capsule; Take 1 capsule by mouth 2 (Two) Times a Day.  Dispense: 180 capsule; Refill: 1    4. Overweight (BMI 25.0-29.9)    5. Need for influenza vaccination  -     Fluzone High-Dose 65+yrs    6. Need for COVID-19 vaccine  -     COVID-19 (Pfizer) 12yrs+ (COMIRNATY)           1. Hypertension.  Her blood pressure is currently well-controlled at 132/82, an improvement from the previous reading of 139. She is advised to continue her current medication regimen, which includes diltiazem 120 mg. She reports occasional chest pain, which her cardiologist attributes to her blood pressure medication. She is advised to monitor her symptoms and report any changes.    2. Peripheral Diabetic Neuropathy.  She reports that pregabalin 150 mg twice a day is effective in managing her symptoms, though she still experiences some cramping and pain, primarily in her right big toe. A prescription for pregabalin has been renewed. She is advised to continue her walking routine and incorporate stretching exercises to help alleviate symptoms.    3. Chronic Pain.  She experiences chronic shoulder pain due to a previous fracture. She is advised to take Tylenol for pain management as needed and avoid ibuprofen due to potential kidney and stomach issues. Topical treatments such as diclofenac, Voltaren gel, or lidocaine patches are recommended for additional pain relief.    4. Thrombocytopenia.  Her platelet count remains stable at 101,000, consistent with previous readings. She is advised to continue taking vitamin B12 1000 mcg daily. No additional medication is required at this time.    5. Diabetes Mellitus.  She is scheduled to see her endocrinologist, Dr. Cerna, on December 4, 2024. Lab orders, including A1c, lipid panel, TSH, vitamin D level, CMP, and microalbumin, are to  be completed before her next appointment.    6. Breast Cancer.  She is currently on anastrozole as adjunctive treatment, which will continue through July 2028. She has a follow-up appointment with her breast surgeon, Dr. Ruiz, on December 3, 2024, and with her oncologist, Dr. Stone, on January 15, 2025. She will have a mammogram and DEXA scan in 2025.    7. Health Maintenance.  She is due for influenza and COVID-19 vaccinations today. She is current with all other immunizations.   She has routine labs including A1c lipid panel and microalbumin ordered through endocrinology, Dr. Cerna.    8.  Advance care planning  Discussion with Patientregarding advanced directives and end of life care was voluntarily entered by patient.  Patient has not had significant change in their medical condition in the past year.     Living Will, POST, and Advanced Care Planning form discussed at length and reviewed with patient.     I spent 16 minutes  with patient reviewing information and documenting  in the chart.      Patient states she does want CPR. Reviewed medical interventions with patient and the differences between each: Comfort, Limited and Full. Patient opted for Full. Discussed the use of antibiotics at the end of life. She chose to use antibiotics consistent with treatment goals. Discussed artificially administered nutrition, patient is aware that if she is alert and oriented they can change their mind at any time. However, they have elected to have a trial of artificial nutrition by tube for undetermined length of time for a goal of good quality of life..   Additionally, she would want to withhold or withdrawal of advanced care interventions including mechanical ventilation and artificial nutrition if there is little or no chance of recovery to good quality of life.    Patient has identified her children as her healthcare representative. Advised to discuss with healthcare representative if they can comply with  wishes. Patient encouraged to have a meeting to discuss his decision regarding advanced care directives and goals of care with extended family and significant  friends  In regard to the POST form:The patient opted to complete POST while in the office and copy scanned into the chart. and advised to give to family members, place in an easily accessible place and take with them if going to the hospital or Emergency room.     Follow-up  Return in 3 months for follow-up on hypertension and peripheral diabetic neuropathy.      Orders Placed This Encounter   Procedures    COVID-19 (Pfizer) 12yrs+ (COMIRNATY)    Fluzone High-Dose 65+yrs     New Medications Ordered This Visit   Medications    pregabalin (LYRICA) 150 MG capsule     Sig: Take 1 capsule by mouth 2 (Two) Times a Day.     Dispense:  180 capsule     Refill:  1     Not to exceed 5 additional fills before 10/23/2023          Follow Up   Return in about 3 months (around 1/22/2025) for diabetes and diabetic neuropathy.  Patient was given instructions and counseling regarding her condition or for health maintenance advice. Please see specific information pulled into the AVS if appropriate.  Patient or patient representative verbalized consent for the use of Ambient Listening during the visit with  Mercedez Guevara MD for chart documentation. 10/22/2024  11:01 EDT

## 2024-10-22 ENCOUNTER — OFFICE VISIT (OUTPATIENT)
Dept: FAMILY MEDICINE CLINIC | Facility: CLINIC | Age: 74
End: 2024-10-22
Payer: MEDICARE

## 2024-10-22 VITALS
SYSTOLIC BLOOD PRESSURE: 132 MMHG | HEART RATE: 83 BPM | HEIGHT: 62 IN | WEIGHT: 151.6 LBS | BODY MASS INDEX: 27.9 KG/M2 | RESPIRATION RATE: 18 BRPM | OXYGEN SATURATION: 96 % | TEMPERATURE: 97.8 F | DIASTOLIC BLOOD PRESSURE: 82 MMHG

## 2024-10-22 DIAGNOSIS — E66.3 OVERWEIGHT (BMI 25.0-29.9): ICD-10-CM

## 2024-10-22 DIAGNOSIS — Z00.00 MEDICARE ANNUAL WELLNESS VISIT, SUBSEQUENT: Primary | ICD-10-CM

## 2024-10-22 DIAGNOSIS — Z23 NEED FOR INFLUENZA VACCINATION: ICD-10-CM

## 2024-10-22 DIAGNOSIS — E11.42 DIABETIC PERIPHERAL NEUROPATHY: ICD-10-CM

## 2024-10-22 DIAGNOSIS — I10 PRIMARY HYPERTENSION: ICD-10-CM

## 2024-10-22 DIAGNOSIS — Z23 NEED FOR COVID-19 VACCINE: ICD-10-CM

## 2024-10-22 PROCEDURE — 99497 ADVNCD CARE PLAN 30 MIN: CPT | Performed by: FAMILY MEDICINE

## 2024-10-22 PROCEDURE — 3075F SYST BP GE 130 - 139MM HG: CPT | Performed by: FAMILY MEDICINE

## 2024-10-22 PROCEDURE — G0439 PPPS, SUBSEQ VISIT: HCPCS | Performed by: FAMILY MEDICINE

## 2024-10-22 PROCEDURE — 1159F MED LIST DOCD IN RCRD: CPT | Performed by: FAMILY MEDICINE

## 2024-10-22 PROCEDURE — 91320 SARSCV2 VAC 30MCG TRS-SUC IM: CPT | Performed by: FAMILY MEDICINE

## 2024-10-22 PROCEDURE — 99214 OFFICE O/P EST MOD 30 MIN: CPT | Performed by: FAMILY MEDICINE

## 2024-10-22 PROCEDURE — 1170F FXNL STATUS ASSESSED: CPT | Performed by: FAMILY MEDICINE

## 2024-10-22 PROCEDURE — 90662 IIV NO PRSV INCREASED AG IM: CPT | Performed by: FAMILY MEDICINE

## 2024-10-22 PROCEDURE — 90480 ADMN SARSCOV2 VAC 1/ONLY CMP: CPT | Performed by: FAMILY MEDICINE

## 2024-10-22 PROCEDURE — G0008 ADMIN INFLUENZA VIRUS VAC: HCPCS | Performed by: FAMILY MEDICINE

## 2024-10-22 PROCEDURE — 1160F RVW MEDS BY RX/DR IN RCRD: CPT | Performed by: FAMILY MEDICINE

## 2024-10-22 PROCEDURE — 1126F AMNT PAIN NOTED NONE PRSNT: CPT | Performed by: FAMILY MEDICINE

## 2024-10-22 PROCEDURE — 3079F DIAST BP 80-89 MM HG: CPT | Performed by: FAMILY MEDICINE

## 2024-10-22 RX ORDER — PREGABALIN 150 MG/1
150 CAPSULE ORAL 2 TIMES DAILY
Qty: 180 CAPSULE | Refills: 1 | Status: SHIPPED | OUTPATIENT
Start: 2024-10-22 | End: 2024-10-28 | Stop reason: SDUPTHER

## 2024-10-27 DIAGNOSIS — E11.42 DIABETIC PERIPHERAL NEUROPATHY: ICD-10-CM

## 2024-10-28 DIAGNOSIS — E11.42 DIABETIC PERIPHERAL NEUROPATHY: ICD-10-CM

## 2024-10-28 RX ORDER — BENAZEPRIL HYDROCHLORIDE 5 MG/1
5 TABLET ORAL DAILY
Qty: 90 TABLET | Refills: 3 | OUTPATIENT
Start: 2024-10-28

## 2024-10-28 RX ORDER — PREGABALIN 150 MG/1
150 CAPSULE ORAL 2 TIMES DAILY
Qty: 180 CAPSULE | Refills: 1 | Status: SHIPPED | OUTPATIENT
Start: 2024-10-28

## 2024-10-28 RX ORDER — PREGABALIN 150 MG/1
CAPSULE ORAL
Qty: 180 CAPSULE | OUTPATIENT
Start: 2024-10-28

## 2024-10-28 NOTE — TELEPHONE ENCOUNTER
Caller: Gi Velez    Relationship: Self    Best call back number: [phone]    Requested Prescriptions:   Requested Prescriptions     Pending Prescriptions Disp Refills    pregabalin (LYRICA) 150 MG capsule 180 capsule 1     Sig: Take 1 capsule by mouth 2 (Two) Times a Day.        Pharmacy where request should be sent: [facility]ACY #[id number] - [facility] [place] [address] [phone] [facility] [phone]      Last office visit with prescribing clinician: 10/22/2024   Last telemedicine visit with prescribing clinician: Visit date not found   Next office visit with prescribing clinician: 1/22/2025     Additional details provided by patient: HAS ONE PILL LEFT , PHARMACY STATED THEY DID NOT GET THIS BEFORE     Does the patient have less than a 3 day supply:  [x] Yes  [] No    Would you like a call back once the refill request has been completed: [] Yes [x] No    If the office needs to give you a call back, can they leave a voicemail: [] Yes [x] No    Alissa Bragg   10/28/24 10:43 EDT

## 2024-10-28 NOTE — TELEPHONE ENCOUNTER
Had sent renewal of Lyrica at appointment October 22, 2024, note indicates pharmacy did not receive the prescription, resending today   negative...

## 2024-10-28 NOTE — TELEPHONE ENCOUNTER
chart indicates she has been taking benazepril at 10 mg daily.  In September, 90-day supply with 3 refills was sent to pharmacy

## 2024-11-25 ENCOUNTER — LAB (OUTPATIENT)
Dept: LAB | Facility: HOSPITAL | Age: 74
End: 2024-11-25
Payer: MEDICARE

## 2024-11-25 DIAGNOSIS — Z79.4 TYPE 2 DIABETES MELLITUS WITH DIABETIC POLYNEUROPATHY, WITH LONG-TERM CURRENT USE OF INSULIN: ICD-10-CM

## 2024-11-25 DIAGNOSIS — E11.42 TYPE 2 DIABETES MELLITUS WITH DIABETIC POLYNEUROPATHY, WITH LONG-TERM CURRENT USE OF INSULIN: ICD-10-CM

## 2024-11-25 DIAGNOSIS — E55.9 VITAMIN D DEFICIENCY: ICD-10-CM

## 2024-11-25 DIAGNOSIS — E78.2 MIXED HYPERLIPIDEMIA: ICD-10-CM

## 2024-11-25 LAB
25(OH)D3 SERPL-MCNC: 36 NG/ML (ref 30–100)
ALBUMIN SERPL-MCNC: 3.8 G/DL (ref 3.5–5.2)
ALBUMIN/GLOB SERPL: 1.2 G/DL
ALP SERPL-CCNC: 95 U/L (ref 39–117)
ALT SERPL W P-5'-P-CCNC: 38 U/L (ref 1–33)
ANION GAP SERPL CALCULATED.3IONS-SCNC: 11.7 MMOL/L (ref 5–15)
AST SERPL-CCNC: 28 U/L (ref 1–32)
BILIRUB SERPL-MCNC: 0.2 MG/DL (ref 0–1.2)
BUN SERPL-MCNC: 19 MG/DL (ref 8–23)
BUN/CREAT SERPL: 19.4 (ref 7–25)
CALCIUM SPEC-SCNC: 9.5 MG/DL (ref 8.6–10.5)
CHLORIDE SERPL-SCNC: 109 MMOL/L (ref 98–107)
CHOLEST SERPL-MCNC: 173 MG/DL (ref 0–200)
CO2 SERPL-SCNC: 24.3 MMOL/L (ref 22–29)
CREAT SERPL-MCNC: 0.98 MG/DL (ref 0.57–1)
EGFRCR SERPLBLD CKD-EPI 2021: 59.9 ML/MIN/1.73
GLOBULIN UR ELPH-MCNC: 3.1 GM/DL
GLUCOSE SERPL-MCNC: 113 MG/DL (ref 65–99)
HBA1C MFR BLD: 8.71 % (ref 4.8–5.6)
HDLC SERPL-MCNC: 33 MG/DL (ref 40–60)
LDLC SERPL CALC-MCNC: 105 MG/DL (ref 0–100)
LDLC/HDLC SERPL: 3.02 {RATIO}
POTASSIUM SERPL-SCNC: 3.9 MMOL/L (ref 3.5–5.2)
PROT SERPL-MCNC: 6.9 G/DL (ref 6–8.5)
SODIUM SERPL-SCNC: 145 MMOL/L (ref 136–145)
TRIGL SERPL-MCNC: 202 MG/DL (ref 0–150)
TSH SERPL DL<=0.05 MIU/L-ACNC: 4.48 UIU/ML (ref 0.27–4.2)
VLDLC SERPL-MCNC: 35 MG/DL (ref 5–40)

## 2024-11-25 PROCEDURE — 82306 VITAMIN D 25 HYDROXY: CPT

## 2024-11-25 PROCEDURE — 84443 ASSAY THYROID STIM HORMONE: CPT

## 2024-11-25 PROCEDURE — 80061 LIPID PANEL: CPT

## 2024-11-25 PROCEDURE — 36415 COLL VENOUS BLD VENIPUNCTURE: CPT

## 2024-11-25 PROCEDURE — 80053 COMPREHEN METABOLIC PANEL: CPT

## 2024-11-25 PROCEDURE — 83036 HEMOGLOBIN GLYCOSYLATED A1C: CPT

## 2024-11-27 ENCOUNTER — LAB (OUTPATIENT)
Dept: LAB | Facility: HOSPITAL | Age: 74
End: 2024-11-27
Payer: MEDICARE

## 2024-11-27 DIAGNOSIS — E55.9 VITAMIN D DEFICIENCY: ICD-10-CM

## 2024-11-27 DIAGNOSIS — Z79.4 TYPE 2 DIABETES MELLITUS WITH DIABETIC POLYNEUROPATHY, WITH LONG-TERM CURRENT USE OF INSULIN: ICD-10-CM

## 2024-11-27 DIAGNOSIS — E11.42 TYPE 2 DIABETES MELLITUS WITH DIABETIC POLYNEUROPATHY, WITH LONG-TERM CURRENT USE OF INSULIN: ICD-10-CM

## 2024-11-27 DIAGNOSIS — E78.2 MIXED HYPERLIPIDEMIA: ICD-10-CM

## 2024-11-27 LAB
ALBUMIN UR-MCNC: 1.5 MG/DL
CREAT UR-MCNC: 113 MG/DL
MICROALBUMIN/CREAT UR: 13.3 MG/G (ref 0–29)

## 2024-11-27 PROCEDURE — 82570 ASSAY OF URINE CREATININE: CPT

## 2024-11-27 PROCEDURE — 82043 UR ALBUMIN QUANTITATIVE: CPT

## 2024-12-03 ENCOUNTER — OFFICE VISIT (OUTPATIENT)
Dept: SURGERY | Facility: CLINIC | Age: 74
End: 2024-12-03
Payer: MEDICARE

## 2024-12-03 VITALS
DIASTOLIC BLOOD PRESSURE: 78 MMHG | HEART RATE: 72 BPM | BODY MASS INDEX: 27.88 KG/M2 | RESPIRATION RATE: 18 BRPM | HEIGHT: 62 IN | TEMPERATURE: 97.5 F | SYSTOLIC BLOOD PRESSURE: 147 MMHG | OXYGEN SATURATION: 97 % | WEIGHT: 151.5 LBS

## 2024-12-03 DIAGNOSIS — D05.11 BREAST NEOPLASM, TIS (DCIS), RIGHT: Primary | ICD-10-CM

## 2024-12-03 PROCEDURE — 3077F SYST BP >= 140 MM HG: CPT | Performed by: SURGERY

## 2024-12-03 PROCEDURE — 99213 OFFICE O/P EST LOW 20 MIN: CPT | Performed by: SURGERY

## 2024-12-03 PROCEDURE — 1160F RVW MEDS BY RX/DR IN RCRD: CPT | Performed by: SURGERY

## 2024-12-03 PROCEDURE — 3078F DIAST BP <80 MM HG: CPT | Performed by: SURGERY

## 2024-12-03 PROCEDURE — 1159F MED LIST DOCD IN RCRD: CPT | Performed by: SURGERY

## 2024-12-04 ENCOUNTER — OFFICE VISIT (OUTPATIENT)
Dept: ENDOCRINOLOGY | Facility: CLINIC | Age: 74
End: 2024-12-04
Payer: MEDICARE

## 2024-12-04 VITALS
SYSTOLIC BLOOD PRESSURE: 108 MMHG | BODY MASS INDEX: 27.6 KG/M2 | WEIGHT: 150 LBS | HEART RATE: 66 BPM | DIASTOLIC BLOOD PRESSURE: 66 MMHG | HEIGHT: 62 IN | OXYGEN SATURATION: 96 %

## 2024-12-04 DIAGNOSIS — E11.42 TYPE 2 DIABETES MELLITUS WITH DIABETIC POLYNEUROPATHY, WITH LONG-TERM CURRENT USE OF INSULIN: Primary | ICD-10-CM

## 2024-12-04 DIAGNOSIS — Z79.4 TYPE 2 DIABETES MELLITUS WITH DIABETIC POLYNEUROPATHY, WITH LONG-TERM CURRENT USE OF INSULIN: Primary | ICD-10-CM

## 2024-12-04 DIAGNOSIS — E78.2 MIXED HYPERLIPIDEMIA: ICD-10-CM

## 2024-12-04 DIAGNOSIS — E55.9 VITAMIN D DEFICIENCY: ICD-10-CM

## 2024-12-04 PROCEDURE — 3078F DIAST BP <80 MM HG: CPT | Performed by: INTERNAL MEDICINE

## 2024-12-04 PROCEDURE — 99214 OFFICE O/P EST MOD 30 MIN: CPT | Performed by: INTERNAL MEDICINE

## 2024-12-04 PROCEDURE — 3074F SYST BP LT 130 MM HG: CPT | Performed by: INTERNAL MEDICINE

## 2024-12-04 NOTE — PATIENT INSTRUCTIONS
Continue exercise.  Increase Novolog to 8 units before supper.  Continue other diabetes & lipid meds, calcium & vit D.  Call if blood sugars are running under 100 or over 200.  F/u in 6 months, with labs prior.

## 2024-12-05 DIAGNOSIS — Z79.4 TYPE 2 DIABETES MELLITUS WITH DIABETIC POLYNEUROPATHY, WITH LONG-TERM CURRENT USE OF INSULIN: ICD-10-CM

## 2024-12-05 DIAGNOSIS — E11.42 TYPE 2 DIABETES MELLITUS WITH DIABETIC POLYNEUROPATHY, WITH LONG-TERM CURRENT USE OF INSULIN: ICD-10-CM

## 2024-12-05 RX ORDER — PEN NEEDLE, DIABETIC 32GX 5/32"
NEEDLE, DISPOSABLE MISCELLANEOUS
Qty: 400 EACH | Refills: 3 | Status: SHIPPED | OUTPATIENT
Start: 2024-12-05

## 2024-12-05 NOTE — PROGRESS NOTES
GENERAL SURGERY ESTABLISHED PATIENT NOTE    Patient Care Team:  Mercedez Guevara MD as PCP - General (Family Medicine)  Joe Ruiz MD as Surgeon (General Surgery)  Vic Cerna MD as Consulting Physician (Endocrinology)  Monty Nation MD as Consulting Physician (Ophthalmology)  Yonatan Fernandez Jr., DPM as Consulting Physician (Podiatry)  Uriel Montenegro MD as Consulting Physician (Orthopedic Surgery)  Elias Coles MD as Consulting Physician (Radiation Oncology)  Jeffy Noyola MD as Consulting Physician (Cardiology)  Mickey Mir MD as Consulting Physician (Hematology and Oncology)    Reason for follow-up: 6-month follow-up for right breast DCIS    Subjective     Patient is a 74 y.o. female presents for 6-month follow-up for right breast DCIS.  The patient underwent right wire localized lumpectomy on 5/25/2023.  She completed 16 treatments of radiation therapy without any complaints.  She was following up with Dr. Cabral for endocrine therapy, and now follows up at the [facility] [place].  She is pleased with her cosmetic outcome, has no issues with range of motion or pain.  Since I last saw her there has been no significant changes to her past medical, surgical, or social history    Review of Systems   Genitourinary:  Negative for breast discharge, breast lump and breast pain.   Hematological:  Negative for adenopathy. Does not bruise/bleed easily.        History  Past Medical History:   Diagnosis Date    Allergies     Breast cancer     Diabetes mellitus     diagnosed in the 1990's    Drug therapy     Dry skin     feet    Hx of radiation therapy     Hyperlipidemia 2000    Hypertension 2018    Insomnia     Irregular heart beat 2005    rare episode  no need to follow with cardiology    Left supracondylar humerus fracture, closed, initial encounter     Lymphocytosis 12/2009    Neuropathy     diagnosed in the 1990's    Sleep apnea     no machine     Thrombocytopenia 12/2009    low normal current    Type 2 diabetes mellitus 1998     Past Surgical History:   Procedure Laterality Date    BREAST BIOPSY      BREAST LUMPECTOMY Right 05/25/2023    Procedure: WIRE LOCALIZED RIGHT LUMPECTOMY;  Surgeon: Joe Ruiz MD;  Location: [facility] MAIN OR;  Service: General;  Laterality: Right;    CATARACT EXTRACTION  2024    CATARACT EXTRACTION, BILATERAL Bilateral 11/2024    COLONOSCOPY W/ BIOPSIES  07/2021    HYSTERECTOMY      in the 1980's-increased bleeding  bilateral oophorectomy    SHOULDER SURGERY Left 10/03/2022    and arm    SHOULDER SURGERY  2024    removal of metal plate and screws    TUBAL ABDOMINAL LIGATION      WISDOM TOOTH EXTRACTION       Family History   Adopted: Yes   Problem Relation Age of Onset    No Known Problems Other      Social History     Tobacco Use    Smoking status: Never     Passive exposure: Never    Smokeless tobacco: Never   Vaping Use    Vaping status: Never Used   Substance Use Topics    Alcohol use: Yes     Comment: rare occasion    Drug use: Never     (Not in a hospital admission)    Allergies:  Adhesive tape, Bee venom, and Topamax [topiramate]    Objective     Vital Signs       Physical Exam  Vitals reviewed. Exam conducted with a chaperone present.   Constitutional:       Appearance: She is well-developed.   HENT:      Head: Normocephalic and atraumatic.   Eyes:      Pupils: Pupils are equal, round, and reactive to light.   Cardiovascular:      Rate and Rhythm: Normal rate and regular rhythm.   Pulmonary:      Effort: Pulmonary effort is normal.      Breath sounds: Normal breath sounds.   Chest:      Comments: Well-healed transverse right breast incision without any surrounding erythema, induration, or drainage to suggest infection.  Fine fibrocystic changes scattered bilaterally.  No overlying skin changes, no discharge from the nipple bilaterally.  Abdominal:      General: There is no distension.      Palpations: Abdomen is  soft.      Tenderness: There is no abdominal tenderness.      Hernia: No hernia is present.   Musculoskeletal:         General: Normal range of motion.      Cervical back: Normal range of motion.   Lymphadenopathy:      Cervical: No cervical adenopathy.      Upper Body:      Right upper body: No supraclavicular or axillary adenopathy.      Left upper body: No supraclavicular or axillary adenopathy.   Skin:     General: Skin is warm and dry.      Findings: No rash.   Neurological:      Mental Status: She is alert and oriented to person, place, and time.         Results Review:   Lab Results (last 24 hours)       ** No results found for the last 24 hours. **          No radiology results for the last day      I reviewed the patient's new imaging results and agree with the interpretation.  I reviewed the patient's other test results and agree with the interpretation    Assessment & Plan   Right breast DCIS    Patient seen and examined.  Most recent screening mammogram performed on 3/29/2024 reviewed with patient which demonstrated no mammographic signs of malignancy and routine screening mammography recommended.  No obvious signs of recurrence, as such, the plan remains as follows:  Follow-up with medical oncology as directed.  Continue endocrine therapy and screening mammography as directed  Follow-up with me in 6 months or sooner if issues arise  Follow-up with radiation oncology as directed    I discussed the patients findings and my recommendations with the patient.     Joe Ruiz MD  12/05/24  14:28 EST

## 2024-12-05 NOTE — PROGRESS NOTES
[facility]        Patient Care Team:  Mercedez Guevara MD as PCP - General (Family Medicine)  Joe Ruiz MD as Surgeon (General Surgery)  Vic Cerna MD as Consulting Physician (Endocrinology)  Monty Nation MD as Consulting Physician (Ophthalmology)  Yonatan Fernandez Jr., JOAN as Consulting Physician (Podiatry)  Uriel Montenegro MD as Consulting Physician (Orthopedic Surgery)  Elias Coles MD as Consulting Physician (Radiation Oncology)  Jeffy Noyola MD as Consulting Physician (Cardiology)  Mickey Mir MD as Consulting Physician (Hematology and Oncology)    Chief Complaint:    Chief Complaint   Patient presents with    Diabetes     fU / DM type 2 / BS Dale 160         Subjective   Here for diabetes f/u  Blood sugars: higher @ supper. Using Dale CGMS (7.6% estimated A1C)  Pt has agreed to wear the CGM device and share readings on a regular basis with our office  Exercise program: yard work  Taking calcium & vit D    Interval History:     Patient Complaints: none  Patient Denies: hypoglycemia  History taken from: patient    Review of Systems:   Review of Systems   Eyes:  Negative for blurred vision.   Gastrointestinal:  Negative for nausea.   Endocrine: Negative for polyuria.   Neurological:  Negative for headache.   Gained 3 lb since last visit    Objective     Vital Signs     Vitals:    12/04/24 1047   BP: 108/66   Pulse: 66   SpO2: 96%         Physical Exam:     General Appearance:    Alert, cooperative, in no acute distress   Head:    Normocephalic, without obvious abnormality, atraumatic   Eyes:            Lids and lashes normal, conjunctivae and sclerae normal, no   icterus, no pallor, corneas clear, PERRLA   Throat:   No oral lesions,  oral mucosa moist   Neck:  37 cm in circumference, thyroid firm, no carotid bruit   Lungs:     Clear     Heart:    Regular rhythm and normal rate   Chest Wall:    No abnormalities observed   Abdomen:      Normal bowel sounds, soft                 Extremities:   Moves all extremities well, no edema               Pulses:   Pulses palpable and equal bilaterally   Skin:   Dry. Plantar calluses   Neurologic:  DTR absent in ankles, vibratory sense 90% decreased in toes, able to feel the 10g monofilament ( same as 1y ago )            Results Review:    I have reviewed the patient's new clinical results, labs & imaging.    Medication Review:   Prior to Admission medications    Medication Sig Start Date End Date Taking? Authorizing Provider   anastrozole (ARIMIDEX) 1 MG tablet Take 1 tablet by mouth Daily. 9/11/24  Yes Mickey Mir MD   atorvastatin (LIPITOR) 40 MG tablet TAKE 1 TABLET BY MOUTH EVERY DAY 7/29/24  Yes Mercedez Guevara MD   benazepril (LOTENSIN) 10 MG tablet Take 1 tablet by mouth Daily. 9/6/24  Yes Mercedez Guevara MD   BIOTIN PO Take 1 dose by mouth Every Evening. Ld 5/18 4/10/18  Yes Ros hSah MD   Calcium Carbonate-Vit D-Min (CALCIUM 1200 PO) Take 1 capsule by mouth Daily.   Yes Ros Shah MD   Cholecalciferol 1000 units capsule Take 1 capsule by mouth Every Evening. Ld 5/18 3/31/15  Yes Ros Shah MD   CINNAMON PO Take 1,500 mg by mouth Every Night. Ld 5/18 4/10/18  Yes Ros Shah MD   clotrimazole-betamethasone (LOTRISONE) 1-0.05 % cream Apply 1 Application topically to the appropriate area as directed As Needed. 12/26/21  Yes Ros Shah MD   Cyanocobalamin (B-12 PO) Take 1,000 mcg by mouth Every Evening. Ld 5/18 4/10/18  Yes Ros Shah MD   docusate sodium (COLACE) 100 MG capsule Take 1 capsule by mouth Daily As Needed. None dos   Yes Ros Shah MD   ELDERBERRY PO Take 2 capsules by mouth Daily.   Yes Ros Shah MD   glimepiride (AMARYL) 4 MG tablet TAKE 2 TABLETS BY MOUTH EVERY MORNING AT BREAKFAST. 7/25/24  Yes Vic Cerna MD   insulin aspart (NovoLOG FlexPen) 100 UNIT/ML solution  pen-injector sc pen Inject 6 units ac tid plus 2 units if blood sugar >200. MDD 30 units. 7/11/24  Yes Vic Cerna MD   Insulin Aspart, w/Niacinamide, (Fiasp FlexTouch) 100 UNIT/ML solution pen-injector Inject 6 units ac tid plus 2 units if blood sugar >200. MDD 30 units. 6/4/24  Yes Vic Cerna MD   Insulin Pen Needle (1st Tier Unifine Pentips) 32G X 4 MM misc Inject 1 Piece under the skin into the appropriate area as directed Every Night. 12/6/23  Yes Vic Cerna MD   Omega-3 Fatty Acids (FISH OIL PO) Take 1,200 mg by mouth Every Evening. Ld 5/18 4/11/17  Yes Ros Shah MD   ONE TOUCH ULTRA TEST test strip 1 each by Other route Daily. 6/19/19  Yes Ros Shah MD   pregabalin (LYRICA) 150 MG capsule Take 1 capsule by mouth 2 (Two) Times a Day. 10/28/24  Yes Mercedez Guevara MD   Tiadylt  MG 24 hr capsule Take 1 capsule by mouth Daily. 7/23/24  Yes Mercedez Guevara MD   Tresiba FlexTouch 200 UNIT/ML solution pen-injector pen injection INJECT 22 UNITS UNDER THE SKIN INTO THE APPROPRIATE AREA AS DIRECTED DAILY. NONE PREOP 10/7/24  Yes Vic Cerna MD         Lab Results   Component Value Date    HGBA1C 8.71 (H) 11/25/2024    HGBA1C 8.54 (H) 05/21/2024    HGBA1C 9.70 (H) 10/10/2023      Lab Results   Component Value Date    GLUCOSE 113 (H) 11/25/2024    BUN 19 11/25/2024    CREATININE 0.98 11/25/2024    EGFRIFNONA 56 (L) 02/07/2022    BCR 19.4 11/25/2024    K 3.9 11/25/2024    CO2 24.3 11/25/2024    CALCIUM 9.5 11/25/2024    ALBUMIN 3.8 11/25/2024    LABIL2 1.4 07/05/2018    AST 28 11/25/2024    ALT 38 (H) 11/25/2024    CHOL 173 11/25/2024     (H) 11/25/2024    HDL 33 (L) 11/25/2024    TRIG 202 (H) 11/25/2024     Lab Results   Component Value Date    TSH 4.480 (H) 11/25/2024    DVRP55FP 36.0 11/25/2024     Microalb/cr ratio 13.3    Assessment & Plan     Diagnoses and all orders for this visit:    1. Type 2 diabetes mellitus with diabetic  polyneuropathy, with long-term current use of insulin (Primary)  -     Hemoglobin A1c; Future    2. Mixed hyperlipidemia  -     Comprehensive Metabolic Panel; Future    3. Vitamin D deficiency    Glucose control not @ goal. Lipids & vit D stable.    Continue exercise.  Increase Novolog to 8 units before supper.  Continue other diabetes & lipid meds, calcium & vit D.  Call if blood sugars are running under 100 or over 200.        Vic Cerna MD  12/05/24  11:31 EST

## 2024-12-06 ENCOUNTER — TELEPHONE (OUTPATIENT)
Dept: ENDOCRINOLOGY | Facility: CLINIC | Age: 74
End: 2024-12-06
Payer: MEDICARE

## 2024-12-24 ENCOUNTER — TELEPHONE (OUTPATIENT)
Dept: ONCOLOGY | Facility: CLINIC | Age: 74
End: 2024-12-24

## 2024-12-24 NOTE — TELEPHONE ENCOUNTER
Caller: Gi Velez    Relationship to patient: Self    Best call back number: [phone] - IF NO ANSWER PLEASE LEAVE A VM.     Chief complaint: PT NEEDS TO R/S WILL BE OUT OF TOWN.     Type of visit: LAB AND FOLLOW UP     Requested date: ANY TIME IN FEB AFTER 02/03     If rescheduling, when is the original appointment: 01/15/25

## 2025-01-21 DIAGNOSIS — E11.42 DIABETIC PERIPHERAL NEUROPATHY: ICD-10-CM

## 2025-01-21 RX ORDER — PREGABALIN 150 MG/1
150 CAPSULE ORAL 2 TIMES DAILY
Qty: 180 CAPSULE | Refills: 0 | Status: SHIPPED | OUTPATIENT
Start: 2025-01-21

## 2025-01-21 NOTE — TELEPHONE ENCOUNTER
Caller: Rosie Gi DELON    Relationship: Self    Best call back number: [phone]    Requested Prescriptions:   Requested Prescriptions     Pending Prescriptions Disp Refills    pregabalin (LYRICA) 150 MG capsule 180 capsule 1     Sig: Take 1 capsule by mouth 2 (Two) Times a Day.        Pharmacy where request should be sent: [id number] - [place], [place] - [address] [facility] AT [address] [phone] [facility] [phone]      Last office visit with prescribing clinician: 10/22/2024   Last telemedicine visit with prescribing clinician: Visit date not found   Next office visit with prescribing clinician: 2/24/2025     Additional details provided by patient: PATIENT IS VISITING DAUGHTER AND WILL NEED HER MEDICATION ABOVE BEFORE SHE COMES HOME. SHE REQUESTING IT SENT TO PHARMACY ABOVE    Does the patient have less than a 3 day supply:  [] Yes  [x] No    Would you like a call back once the refill request has been completed: [] Yes [x] No    If the office needs to give you a call back, can they leave a voicemail: [] Yes [x] No    Gela Sullivan Rep   01/21/25 10:31 EST

## 2025-02-03 ENCOUNTER — TELEPHONE (OUTPATIENT)
Dept: ONCOLOGY | Facility: CLINIC | Age: 75
End: 2025-02-03

## 2025-02-03 NOTE — TELEPHONE ENCOUNTER
Caller: Gi Velez    Relationship to patient: Self    Best call back number: [phone]    PLEASE LEAVE VM IF UNABLE TO REACH     Chief complaint: NEEDING TO RESCHEDULE DUE TO OUT OF TOWN HAD MISSED FLIGHT.     Type of visit: LAB AND FOLLOW UP 1    Requested date: ANYTIME THE FOLLOWING WEEK , PREFER EARLY APPT TIME IF CAN  BUT WILL DO ANYTIME     If rescheduling, when is the original appointment: 02/07

## 2025-02-04 ENCOUNTER — TELEPHONE (OUTPATIENT)
Dept: ENDOCRINOLOGY | Facility: CLINIC | Age: 75
End: 2025-02-04
Payer: MEDICARE

## 2025-02-21 NOTE — PROGRESS NOTES
Chief Complaint  Diabetes and Hyperlipidemia    History of Present Illness  Gi Velez presents today for follow up on diabetes and hyperlipidemia    Diabetes  Patient does check blood sugar at home 1 times daily.  Per patient fasting blood sugars range between 100 to 150.  Associated symptoms include Neuropathy and Fatigue  Per patient current diet is Variety of foods.  Patient does avoid concentrated sweets.  Patient does see podiatry.  Patient see's Dr. Nation for diabetic eye exam and last eye exam was 12/31/24.  Patient reports they are taking medications as prescribed and they are not having side effects.  Last A1c was 7.9 on 2/24/25.     Hyperlipidemia  Patient is following a low cholesterol diet.   Currently is on statin therapy.  Patient reports is exercising.  Patient reports they are taking medications as prescribed and they are not having side effects.    History of Present Illness  The patient is a 74-year-old female who presents for follow-up on hypertension and diabetic peripheral neuropathy.    She reports no significant changes in her health status since her last visit. She has been adhering to a balanced diet, incorporating at least one nutritious meal daily, which includes vegetables and meat. However, she occasionally substitutes this with a sandwich. She has been under the care of Dr. Cerna for her diabetes management. Her most recent A1c level was recorded as 8.7 in November 2024. She has been making dietary modifications, including the consumption of whole grain bread and rice, and occasionally follows a keto diet.    She has been experiencing persistent foot pain, which she attributes to her diabetic peripheral neuropathy. She has attempted various treatments, including gabapentin, which she discontinued due to adverse effects. She has also tried lidocaine patches, but found them ineffective as they do not adhere well to her feet. She is considering over-the-counter options to  supplement her current treatment. She reports that Lyrica induces fatigue, but does not aid in sleep. She has approximately one month's supply of Lyrica remaining and is seeking a refill. She has been prescribed Lyrica for her condition, which she finds effective.    She has been diagnosed with thrombocytopenia, which is being managed by her oncologist. She undergoes regular blood tests to monitor her condition. She has been informed that her platelet count is within the normal range, albeit on the lower end.    She has been experiencing sleep disturbances, often taking up to an hour to fall asleep, regardless of her bedtime. She has been using reading as a relaxation technique before bed, which she finds somewhat helpful.    She has been diagnosed with hypertension, which is being managed with benazepril and Cardizem. She reports no issues with these medications.    She has been diagnosed with hyperlipidemia, with elevated triglyceride levels of 202. Despite her efforts, she has been unable to increase her HDL levels. She has been supplementing her diet with fish oil, taking two capsules daily, and attempts to include seafood in her meals at least once a week.    She had a colonoscopy in 07/2021 and was advised to repeat it in 3 years. She needs to have a mammogram scheduled. She sees her oncologist every 3 or 4 months. She has been taking medication for 2 years and needs to continue it for 5 years. She had an echocardiogram and a stress test done. She will see Dr. Noyola again in 04/2025. She will see Dr. Ruiz one more time in 06/2025. She also has one more visit to see the radiology oncologist on 03/28/2025.    MEDICATIONS  Current: atorvastatin, gabapentin, benazepril, diltiazem, fish oil supplements      Patient Care Team:  Mercedez Guevara MD as PCP - General (Family Medicine)  Joe Ruiz MD as Surgeon (General Surgery)  Vic Cerna MD as Consulting Physician  (Endocrinology)  Monty Nation MD as Consulting Physician (Ophthalmology)  Yonatan Fernandez Jr., DPM as Consulting Physician (Podiatry)  Uriel Montenegro MD as Consulting Physician (Orthopedic Surgery)  Elias Coles MD as Consulting Physician (Radiation Oncology)  Jeffy Noyola MD as Consulting Physician (Cardiology)  Mickey Mir MD as Consulting Physician (Hematology and Oncology)   Current Outpatient Medications on File Prior to Visit   Medication Sig    anastrozole (ARIMIDEX) 1 MG tablet Take 1 tablet by mouth Daily.    atorvastatin (LIPITOR) 40 MG tablet TAKE 1 TABLET BY MOUTH EVERY DAY    benazepril (LOTENSIN) 10 MG tablet Take 1 tablet by mouth Daily.    BIOTIN PO Take 1 dose by mouth Every Evening. Ld 5/18    Calcium Carbonate-Vit D-Min (CALCIUM 1200 PO) Take 1 capsule by mouth Daily.    Cholecalciferol 1000 units capsule Take 1 capsule by mouth Every Evening. Ld 5/18    CINNAMON PO Take 1,500 mg by mouth Every Night. Ld 5/18    Cyanocobalamin (B-12 PO) Take 1,000 mcg by mouth Every Evening. Ld 5/18    docusate sodium (COLACE) 100 MG capsule Take 1 capsule by mouth Daily As Needed. None dos    ELDERBERRY PO Take 2 capsules by mouth Daily.    glimepiride (AMARYL) 4 MG tablet TAKE 2 TABLETS BY MOUTH EVERY MORNING AT BREAKFAST.    insulin aspart (NovoLOG FlexPen) 100 UNIT/ML solution pen-injector sc pen Inject 6 units ac tid plus 2 units if blood sugar >200. MDD 30 units.    Insulin Aspart, w/Niacinamide, (Fiasp FlexTouch) 100 UNIT/ML solution pen-injector Inject 6 units ac tid plus 2 units if blood sugar >200. MDD 30 units.    Insulin Pen Needle (1st Tier Unifine Pentips) 32G X 4 MM misc Use 4 daily to inject insulin. E11.42    Omega-3 Fatty Acids (FISH OIL PO) Take 1,200 mg by mouth Every Evening. Ld 5/18    ONE TOUCH ULTRA TEST test strip 1 each by Other route Daily.    Tiadylt  MG 24 hr capsule Take 1 capsule by mouth Daily.    Tresiba FlexTouch 200 UNIT/ML solution pen-injector pen  "injection INJECT 22 UNITS UNDER THE SKIN INTO THE APPROPRIATE AREA AS DIRECTED DAILY. NONE PREOP    [DISCONTINUED] pregabalin (LYRICA) 150 MG capsule Take 1 capsule by mouth 2 (Two) Times a Day.    clotrimazole-betamethasone (LOTRISONE) 1-0.05 % cream Apply 1 Application topically to the appropriate area as directed As Needed. (Patient not taking: Reported on 2/24/2025)     No current facility-administered medications on file prior to visit.       Objective   Vital Signs:   /62 (BP Location: Right arm, Patient Position: Sitting, Cuff Size: Large Adult)   Pulse 78   Temp 98.7 °F (37.1 °C) (Temporal)   Resp 18   Ht 157.5 cm (62\")   Wt 68.2 kg (150 lb 6.4 oz)   SpO2 93%   BMI 27.51 kg/m²    BP Readings from Last 3 Encounters:   02/24/25 108/62   12/04/24 108/66   12/03/24 147/78     Wt Readings from Last 3 Encounters:   02/24/25 68.2 kg (150 lb 6.4 oz)   12/04/24 68 kg (150 lb)   12/03/24 68.7 kg (151 lb 8 oz)         Physical Exam  Vitals and nursing note reviewed.   Constitutional:       General: She is not in acute distress.     Appearance: She is well-developed.   HENT:      Head: Normocephalic and atraumatic.   Cardiovascular:      Rate and Rhythm: Normal rate and regular rhythm.      Heart sounds: No murmur heard.  Pulmonary:      Effort: Pulmonary effort is normal.      Breath sounds: Normal breath sounds. No wheezing.   Musculoskeletal:         General: Normal range of motion.   Skin:     General: Skin is warm and dry.      Findings: No rash.   Neurological:      Mental Status: She is alert and oriented to person, place, and time.          Physical Exam  Lungs were auscultated.      Office Visit on 02/24/2025   Component Date Value Ref Range Status    Hemoglobin A1C 02/24/2025 7.9 (A)  4.5 - 5.7 % Final    Lot Number 02/24/2025 #[id number]   Final    Expiration Date 02/24/2025 11/30/2026   Final     A1C Last 3 Results          5/21/2024    08:08 11/25/2024    08:33 2/24/2025    08:24   HGBA1C Last 3 " Results   Hemoglobin A1C 8.54  8.71  7.9      Lab Results   Component Value Date    CHOL 173 11/25/2024    TRIG 202 (H) 11/25/2024    HDL 33 (L) 11/25/2024     (H) 11/25/2024     Lab Results   Component Value Date    TSH 4.480 (H) 11/25/2024     Lab Results   Component Value Date    GLUCOSE 113 (H) 11/25/2024    BUN 19 11/25/2024    CREATININE 0.98 11/25/2024    EGFRIFNONA 56 (L) 02/07/2022    BCR 19.4 11/25/2024    K 3.9 11/25/2024    CO2 24.3 11/25/2024    CALCIUM 9.5 11/25/2024    ALBUMIN 3.8 11/25/2024    AST 28 11/25/2024    ALT 38 (H) 11/25/2024     Lab Results   Component Value Date    WBC 5.62 09/11/2024    HGB 13.6 09/11/2024    HCT 42.8 09/11/2024    MCV 85.9 09/11/2024     (L) 09/11/2024             Results  Laboratory Studies  A1c was 8.7 in November 2024 and today it is 7.9. Triglycerides were 202 in November 2024. Platelet count was 94.             Assessment and Plan    Diagnoses and all orders for this visit:    1. Type 2 diabetes mellitus with diabetic polyneuropathy, with long-term current use of insulin (Primary)  -     POC Glycosylated Hemoglobin (Hb A1C)    2. Mixed hyperlipidemia    3. Overweight (BMI 25.0-29.9)    4. Diabetic peripheral neuropathy  -     pregabalin (LYRICA) 150 MG capsule; Take 1 capsule by mouth 2 (Two) Times a Day.  Dispense: 180 capsule; Refill: 1    5. Encounter for screening mammogram for breast cancer  -     Mammo Screening Digital Tomosynthesis Bilateral With CAD; Future    6. Ductal carcinoma in situ of right breast  -     Mammo Screening Digital Tomosynthesis Bilateral With CAD; Future        Assessment & Plan  1. Hypertension.  Her blood pressure readings are within the normal range today. She will continue her current medications, benazepril and diltiazem.    2. Diabetic peripheral neuropathy.  She was advised to consider the use of lidocaine lotions such as Biofreeze with lidocaine. The potential benefits of tricyclic antidepressants were discussed,  but it was noted that these may not be as effective as her current treatment. A prescription for pregabalin 150 mg, to be taken twice daily, was provided with a 90-day supply and one refill.     3. Thrombocytopenia.  Followed by hematology her platelet count is slightly low at 94, but not critically so. She will continue to have her blood counts monitored regularly.    4. Insomnia.  She was advised to try sleep apps like Breathe and Calm, and to practice the 4-7-8 breathing technique to aid in sleep.    5. Hyperlipidemia.  Her triglyceride levels are elevated, likely due to her diabetes. She is currently on atorvastatin 40 mg. She was advised to maintain a low-cholesterol diet rich in vegetables, fruits, and whole grains, and to incorporate more omega-3 fatty acids and fish oils into her diet.    6. Health maintenance.  Her DEXA scan results from August 2023 were within normal limits. Her last mammogram was conducted on March 29, 2024, and the results were classified as BI-RADS 2. She is due for a colonoscopy, which was last performed in July 2021. She was advised to schedule a mammogram at [facility].    7. Diabetes Mellitus.  Her A1c has improved from 8.7 in November to 7.9 today. She was advised to continue her current management plan, including diet and exercise.    Follow-up  The patient is scheduled for a follow-up visit in July 2025.    PROCEDURE  Colonoscopy was performed in 07/2021.      Medications Discontinued During This Encounter   Medication Reason    pregabalin (LYRICA) 150 MG capsule Reorder         Follow Up     Return in about 4 months (around 7/1/2025) for Recheck, htn and neuropathy.    Patient was given instructions and counseling regarding her condition or for health maintenance advice. Please see specific information pulled into the AVS if appropriate.     Patient or patient representative verbalized consent for the use of Ambient Listening during the visit with  Mercedez Guevara MD for  chart documentation. 2/24/2025  17:23 EST

## 2025-02-24 ENCOUNTER — OFFICE VISIT (OUTPATIENT)
Dept: FAMILY MEDICINE CLINIC | Facility: CLINIC | Age: 75
End: 2025-02-24
Payer: MEDICARE

## 2025-02-24 VITALS
SYSTOLIC BLOOD PRESSURE: 108 MMHG | WEIGHT: 150.4 LBS | TEMPERATURE: 98.7 F | RESPIRATION RATE: 18 BRPM | BODY MASS INDEX: 27.68 KG/M2 | HEIGHT: 62 IN | DIASTOLIC BLOOD PRESSURE: 62 MMHG | HEART RATE: 78 BPM | OXYGEN SATURATION: 93 %

## 2025-02-24 DIAGNOSIS — E11.42 TYPE 2 DIABETES MELLITUS WITH DIABETIC POLYNEUROPATHY, WITH LONG-TERM CURRENT USE OF INSULIN: Primary | ICD-10-CM

## 2025-02-24 DIAGNOSIS — D05.11 DUCTAL CARCINOMA IN SITU OF RIGHT BREAST: ICD-10-CM

## 2025-02-24 DIAGNOSIS — E78.2 MIXED HYPERLIPIDEMIA: ICD-10-CM

## 2025-02-24 DIAGNOSIS — E11.42 DIABETIC PERIPHERAL NEUROPATHY: ICD-10-CM

## 2025-02-24 DIAGNOSIS — E66.3 OVERWEIGHT (BMI 25.0-29.9): ICD-10-CM

## 2025-02-24 DIAGNOSIS — Z79.4 TYPE 2 DIABETES MELLITUS WITH DIABETIC POLYNEUROPATHY, WITH LONG-TERM CURRENT USE OF INSULIN: Primary | ICD-10-CM

## 2025-02-24 DIAGNOSIS — Z12.31 ENCOUNTER FOR SCREENING MAMMOGRAM FOR BREAST CANCER: ICD-10-CM

## 2025-02-24 LAB
EXPIRATION DATE: ABNORMAL
HBA1C MFR BLD: 7.9 % (ref 4.5–5.7)
Lab: ABNORMAL

## 2025-02-24 PROCEDURE — 1126F AMNT PAIN NOTED NONE PRSNT: CPT | Performed by: FAMILY MEDICINE

## 2025-02-24 PROCEDURE — 83036 HEMOGLOBIN GLYCOSYLATED A1C: CPT | Performed by: FAMILY MEDICINE

## 2025-02-24 PROCEDURE — 99214 OFFICE O/P EST MOD 30 MIN: CPT | Performed by: FAMILY MEDICINE

## 2025-02-24 PROCEDURE — 3078F DIAST BP <80 MM HG: CPT | Performed by: FAMILY MEDICINE

## 2025-02-24 PROCEDURE — 3074F SYST BP LT 130 MM HG: CPT | Performed by: FAMILY MEDICINE

## 2025-02-24 PROCEDURE — 1160F RVW MEDS BY RX/DR IN RCRD: CPT | Performed by: FAMILY MEDICINE

## 2025-02-24 PROCEDURE — 3051F HG A1C>EQUAL 7.0%<8.0%: CPT | Performed by: FAMILY MEDICINE

## 2025-02-24 PROCEDURE — 1159F MED LIST DOCD IN RCRD: CPT | Performed by: FAMILY MEDICINE

## 2025-02-24 RX ORDER — PREGABALIN 150 MG/1
150 CAPSULE ORAL 2 TIMES DAILY
Qty: 180 CAPSULE | Refills: 1 | Status: SHIPPED | OUTPATIENT
Start: 2025-02-24

## 2025-02-28 NOTE — PROGRESS NOTES
"                         HEMATOLOGY ONCOLOGY OUTPATIENT FOLLOWUP       Patient name: Gi Velez  : 1950  MRN: [id number]  Primary Care Physician: Mercedez Guevara MD  Referring Physician: Mercedez Guevara*  Reason For Consult: ITP, B12 low, anemia, DCIS      History of Present Illness:  Patient is a 74 y.o. female with history of immune thrombocytopenia, B12 deficiency, anemia, DCIS.    2009 patient had a initial bone marrow biopsy which showed normocellular bone marrow with low iron stores, she had a low B12 level at 289, at that point her hemoglobin was 13, platelet count was 91,000  She has been on supplementation with B12 orally    2024 WBC 7.18, hemoglobin 14.5, hematocrit 43.9, MCV 79.4, platelet count 90,    Patient also has a history of DCIS  2023 stereotactic biopsy of the right breast with pathology revealing hide grade ductal carcinoma in situ, ER/WA positive  2023 patient had breast lumpectomy final path with residual DCIS, completely excised  2023 patient completed radiation treatment 16 fractions of 266 cGy per fraction  2023 patient started on anastrozole 1 mg daily along with calcium and vitamin D 600 mg daily  23 - DEXA  normal.    3/2024 mammogram benign.    2024: Has been feeling reasonably well.  Complains only of fatigue that she attributes to her \"blood disorder\".  She seemed to believe that her hemoglobin and hematocrit were abnormal but they have not been for some time.  She does persist with thrombocytopenia that has been investigated before.  She has been eating well and maintains a good appetite.  Her weight has been stable.  She has been afebrile.  Complains of back pain that is a chronic problem that has not changed recently.  She has no other deep bone pains.  No dyspnea.  Her chest pains have been occasional but have been investigated by cardiology.  No abdominal pain or diarrhea and no dysuria.  On exam alert and " conversant.  In no distress.  The lungs are clear bilaterally.  No palpable lymphadenopathy in the neck or axillary spaces.  The lungs are clear and the abdomen soft.  No edema.  Laboratory exams reviewed and discussed with her.  To see me in approximately 4 months.  Continue with the same medication.  Sent new prescriptions.  Mammogram and DEXA scan in 2025.    3/3/2025: Felling reasonably well. No new symptoms. Denies being energetic in the mornings, but she has no limitations. Sh eats well and her weight has been stable. No dyspnea but she has occasional chest pains for which she has been seen by cardiology. Denies abdominal pain. Has had occasional diarrhea, improved since she was taken off metformin. No dysuria. No edema. On exam alert and conversant. Oriented and in no distress. No jaundice. No oral lesions and no palpable adenopathy in the neck, supraclavicular areas and axillar spaces. Lungs clear and heart regular. Abdomen rounded but soft and free of hepatomegaly or splenomegaly. No edema. Reviewed the laboratory exams and discussed with her. To continue with the same and see me in 4 months. Mammogram later this month and Dexa in August of this year.     Past Medical History:   Diagnosis Date    Allergies     Breast cancer     Diabetes mellitus     diagnosed in the 1990's    Drug therapy     Dry skin     feet    Hx of radiation therapy     Hyperlipidemia 2000    Hypertension 2018    Insomnia     Irregular heart beat 2005    rare episode  no need to follow with cardiology    Left supracondylar humerus fracture, closed, initial encounter     Lymphocytosis 12/2009    Neuropathy     diagnosed in the 1990's    Sleep apnea     no machine    Thrombocytopenia 12/2009    low normal current    Type 2 diabetes mellitus 1998     Past Surgical History:   Procedure Laterality Date    BREAST BIOPSY      BREAST LUMPECTOMY Right 05/25/2023    Procedure: WIRE LOCALIZED RIGHT LUMPECTOMY;  Surgeon: Joe Ruiz,  MD;  Location: [facility] MAIN OR;  Service: General;  Laterality: Right;    CATARACT EXTRACTION  2024    CATARACT EXTRACTION, BILATERAL Bilateral 11/2024    COLONOSCOPY W/ BIOPSIES  07/2021    HYSTERECTOMY      in the 1980's-increased bleeding  bilateral oophorectomy    SHOULDER SURGERY Left 10/03/2022    and arm    SHOULDER SURGERY  2024    removal of metal plate and screws    TUBAL ABDOMINAL LIGATION      WISDOM TOOTH EXTRACTION         Current Outpatient Medications:     anastrozole (ARIMIDEX) 1 MG tablet, Take 1 tablet by mouth Daily., Disp: 90 tablet, Rfl: 3    atorvastatin (LIPITOR) 40 MG tablet, TAKE 1 TABLET BY MOUTH EVERY DAY, Disp: 90 tablet, Rfl: 3    benazepril (LOTENSIN) 10 MG tablet, Take 1 tablet by mouth Daily., Disp: 90 tablet, Rfl: 3    BIOTIN PO, Take 1 dose by mouth Every Evening. Ld 5/18, Disp: , Rfl:     Calcium Carbonate-Vit D-Min (CALCIUM 1200 PO), Take 1 capsule by mouth Daily., Disp: , Rfl:     Cholecalciferol 1000 units capsule, Take 1 capsule by mouth Every Evening. Ld 5/18, Disp: , Rfl:     CINNAMON PO, Take 1,500 mg by mouth Every Night. Ld 5/18, Disp: , Rfl:     Cyanocobalamin (B-12 PO), Take 1,000 mcg by mouth Every Evening. Ld 5/18, Disp: , Rfl:     docusate sodium (COLACE) 100 MG capsule, Take 1 capsule by mouth Daily As Needed. None dos, Disp: , Rfl:     ELDERBERRY PO, Take 2 capsules by mouth Daily., Disp: , Rfl:     glimepiride (AMARYL) 4 MG tablet, TAKE 2 TABLETS BY MOUTH EVERY MORNING AT BREAKFAST., Disp: 180 tablet, Rfl: 3    insulin aspart (NovoLOG FlexPen) 100 UNIT/ML solution pen-injector sc pen, Inject 6 units ac tid plus 2 units if blood sugar >200. MDD 30 units., Disp: 30 mL, Rfl: 3    Insulin Aspart, w/Niacinamide, (Fiasp FlexTouch) 100 UNIT/ML solution pen-injector, Inject 6 units ac tid plus 2 units if blood sugar >200. MDD 30 units., Disp: 30 mL, Rfl: 3    Insulin Pen Needle (1st Tier Unifine Pentips) 32G X 4 MM misc, Use 4 daily to inject insulin. E11.42, Disp: 400  each, Rfl: 3    Omega-3 Fatty Acids (FISH OIL PO), Take 1,200 mg by mouth Every Evening. Ld 5/18, Disp: , Rfl:     ONE TOUCH ULTRA TEST test strip, 1 each by Other route Daily., Disp: , Rfl: 5    pregabalin (LYRICA) 150 MG capsule, Take 1 capsule by mouth 2 (Two) Times a Day., Disp: 180 capsule, Rfl: 1    Tiadylt  MG 24 hr capsule, Take 1 capsule by mouth Daily., Disp: 90 capsule, Rfl: 3    Tresiba FlexTouch 200 UNIT/ML solution pen-injector pen injection, INJECT 22 UNITS UNDER THE SKIN INTO THE APPROPRIATE AREA AS DIRECTED DAILY. NONE PREOP, Disp: 9 mL, Rfl: 3    clotrimazole-betamethasone (LOTRISONE) 1-0.05 % cream, Apply 1 Application topically to the appropriate area as directed As Needed. (Patient not taking: Reported on 3/3/2025), Disp: , Rfl:     Allergies   Allergen Reactions    Adhesive Tape Other (See Comments)     Blisters      Bee Venom Hives    Topamax [Topiramate] Hives     Family History   Adopted: Yes   Problem Relation Age of Onset    No Known Problems Other      Cancer-related family history is not on file. She was adopted.    Social History     Tobacco Use    Smoking status: Never     Passive exposure: Never    Smokeless tobacco: Never   Vaping Use    Vaping status: Never Used   Substance Use Topics    Alcohol use: Yes     Comment: rare occasion    Drug use: Never     Social History     Social History Narrative    Not on file     ROS:   Review of Systems   Constitutional:  Positive for fatigue. Negative for fever.   HENT:  Negative for congestion and nosebleeds.    Eyes:  Negative for pain and itching.   Respiratory:  Negative for cough and shortness of breath.    Cardiovascular:  Negative for chest pain.   Gastrointestinal:  Negative for abdominal pain, blood in stool, diarrhea, nausea and vomiting.   Endocrine: Negative for cold intolerance and heat intolerance.   Genitourinary:  Negative for difficulty urinating.   Musculoskeletal:  Negative for arthralgias.   Skin:  Negative for rash.  "  Neurological:  Negative for dizziness and headaches.   Hematological:  Does not bruise/bleed easily.   Psychiatric/Behavioral:  Negative for behavioral problems.      Objective:    Vital Signs:  Vitals:    03/03/25 1103   BP: 135/70   Pulse: 66   Resp: 14   Temp: 97.6 °F (36.4 °C)   SpO2: 97%   Weight: 67.4 kg (148 lb 9.6 oz)   Height: 157.5 cm (62\")   PainSc: 0-No pain     Body mass index is 27.18 kg/m².    ECOG  (0) Fully active, able to carry on all predisease performance without restriction    Physical Exam:   Physical Exam  Constitutional:       Appearance: Normal appearance.   HENT:      Head: Normocephalic and atraumatic.   Eyes:      Pupils: Pupils are equal, round, and reactive to light.   Cardiovascular:      Rate and Rhythm: Normal rate and regular rhythm.      Pulses: Normal pulses.      Heart sounds: No murmur heard.  Pulmonary:      Effort: Pulmonary effort is normal.      Breath sounds: Normal breath sounds.   Abdominal:      General: There is no distension.      Palpations: Abdomen is soft. There is no mass.      Tenderness: There is no abdominal tenderness.   Musculoskeletal:         General: Normal range of motion.      Cervical back: Normal range of motion and neck supple.   Skin:     General: Skin is warm.   Neurological:      General: No focal deficit present.      Mental Status: She is alert.   Psychiatric:         Mood and Affect: Mood normal.     RENETTA Mir MD performed the physical exam on 9/11/2024 as documented above.    Lab Results - Last 18 Months   Lab Units 03/03/25  1058 09/11/24  0852 06/11/24  0931   WBC 10*3/mm3 5.62 5.62 5.14   HEMOGLOBIN g/dL 14.0 13.6 14.1   HEMATOCRIT % 44.9 42.8 43.8   PLATELETS 10*3/mm3 88* 101* 101*   MCV fL 86.3 85.9 83.4     Lab Results - Last 18 Months   Lab Units 11/25/24  0833 09/11/24  0852 06/11/24  0931   SODIUM mmol/L 145 143  143 142   POTASSIUM mmol/L 3.9 4.1  4.1 4.5   CHLORIDE mmol/L 109* 106  107 106   CO2 mmol/L 24.3 24.6  25.2 " 24.2   BUN mg/dL 19 25*  26* 22   CREATININE mg/dL 0.98 1.23*  1.28* 1.07*   CALCIUM mg/dL 9.5 9.7  9.6 9.8   BILIRUBIN mg/dL 0.2 0.4 0.3   ALK PHOS U/L 95 99 102   ALT (SGPT) U/L 38* 55* 31   AST (SGOT) U/L 28 32 25   GLUCOSE mg/dL 113* 215*  215* 239*     Lab Results   Component Value Date    GLUCOSE 113 (H) 11/25/2024    BUN 19 11/25/2024    CREATININE 0.98 11/25/2024    EGFRIFNONA 56 (L) 02/07/2022    BCR 19.4 11/25/2024    K 3.9 11/25/2024    CO2 24.3 11/25/2024    CALCIUM 9.5 11/25/2024    ALBUMIN 3.8 11/25/2024    AST 28 11/25/2024    ALT 38 (H) 11/25/2024     Lab Results   Component Value Date    IRON 117 04/04/2017    TIBC 504 (H) 04/04/2017    FERRITIN 46 11/02/2017     Lab Results   Component Value Date    FOLATE >24.8 (H) 04/04/2017     Lab Results   Component Value Date    PHRIJNTX50 >2,000 (H) 09/30/2022     Lab Results   Component Value Date    ALESSANDRO  04/04/2017     NEGATIVE This result is designed to aid in the diagnosis of many of the    ALESSANDRO  04/04/2017     systemic autoimmune disorders and is not diagnostic by itself.  Test results    ALESSANDRO  04/04/2017     should be interpreted in conjunction with the clinical evaluation of the    ALESSANDRO  04/04/2017     patient.  SLE patients undergoing steroid therapy may have negative results.     Lab Results   Component Value Date    PTT 25.4 05/25/2023    INR 0.97 05/25/2023     Assessment & Plan     1.  Ductal carcinoma in situ, estrogen and progesterone receptor positive.  Underwent surgery followed by adjuvant radiation.  On adjuvant treatment with anastrozole which she will continue through July 2028.  Reviewed and discussed with her the result of he laboratory exams. Continue same treatment.   2.  Chronic immune thrombocytopenic purpura: No major change. Continue observation  3.  Mammogram and DEXA scan in 2025.  4.  She's to see me in 4 months.     Mickey Mir MD on 3/3/2025 at 11:35 AM.

## 2025-03-03 ENCOUNTER — LAB (OUTPATIENT)
Dept: LAB | Facility: HOSPITAL | Age: 75
End: 2025-03-03
Payer: MEDICARE

## 2025-03-03 ENCOUNTER — OFFICE VISIT (OUTPATIENT)
Dept: ONCOLOGY | Facility: CLINIC | Age: 75
End: 2025-03-03
Payer: MEDICARE

## 2025-03-03 VITALS
SYSTOLIC BLOOD PRESSURE: 135 MMHG | HEART RATE: 66 BPM | OXYGEN SATURATION: 97 % | DIASTOLIC BLOOD PRESSURE: 70 MMHG | BODY MASS INDEX: 27.34 KG/M2 | RESPIRATION RATE: 14 BRPM | TEMPERATURE: 97.6 F | HEIGHT: 62 IN | WEIGHT: 148.6 LBS

## 2025-03-03 DIAGNOSIS — Z79.811 AROMATASE INHIBITOR USE: ICD-10-CM

## 2025-03-03 DIAGNOSIS — D69.6 THROMBOCYTOPENIA: Primary | ICD-10-CM

## 2025-03-03 DIAGNOSIS — D05.11 BREAST NEOPLASM, TIS (DCIS), RIGHT: Primary | ICD-10-CM

## 2025-03-03 LAB
BASOPHILS # BLD AUTO: 0.02 10*3/MM3 (ref 0–0.2)
BASOPHILS NFR BLD AUTO: 0.4 % (ref 0–1.5)
DEPRECATED RDW RBC AUTO: 50.4 FL (ref 37–54)
EOSINOPHIL # BLD AUTO: 0.18 10*3/MM3 (ref 0–0.4)
EOSINOPHIL NFR BLD AUTO: 3.2 % (ref 0.3–6.2)
ERYTHROCYTE [DISTWIDTH] IN BLOOD BY AUTOMATED COUNT: 16.1 % (ref 12.3–15.4)
HCT VFR BLD AUTO: 44.9 % (ref 34–46.6)
HGB BLD-MCNC: 14 G/DL (ref 12–15.9)
HOLD SPECIMEN: NORMAL
HOLD SPECIMEN: NORMAL
LYMPHOCYTES # BLD AUTO: 2.27 10*3/MM3 (ref 0.7–3.1)
LYMPHOCYTES NFR BLD AUTO: 40.4 % (ref 19.6–45.3)
MCH RBC QN AUTO: 26.9 PG (ref 26.6–33)
MCHC RBC AUTO-ENTMCNC: 31.2 G/DL (ref 31.5–35.7)
MCV RBC AUTO: 86.3 FL (ref 79–97)
MONOCYTES # BLD AUTO: 0.6 10*3/MM3 (ref 0.1–0.9)
MONOCYTES NFR BLD AUTO: 10.7 % (ref 5–12)
NEUTROPHILS NFR BLD AUTO: 2.55 10*3/MM3 (ref 1.7–7)
NEUTROPHILS NFR BLD AUTO: 45.3 % (ref 42.7–76)
PLATELET # BLD AUTO: 88 10*3/MM3 (ref 140–450)
PMV BLD AUTO: 12.7 FL (ref 6–12)
RBC # BLD AUTO: 5.2 10*6/MM3 (ref 3.77–5.28)
WBC NRBC COR # BLD AUTO: 5.62 10*3/MM3 (ref 3.4–10.8)

## 2025-03-03 PROCEDURE — 85025 COMPLETE CBC W/AUTO DIFF WBC: CPT | Performed by: INTERNAL MEDICINE

## 2025-03-03 PROCEDURE — 36415 COLL VENOUS BLD VENIPUNCTURE: CPT | Performed by: INTERNAL MEDICINE

## 2025-03-03 PROCEDURE — 1159F MED LIST DOCD IN RCRD: CPT | Performed by: INTERNAL MEDICINE

## 2025-03-03 PROCEDURE — 3075F SYST BP GE 130 - 139MM HG: CPT | Performed by: INTERNAL MEDICINE

## 2025-03-03 PROCEDURE — 99213 OFFICE O/P EST LOW 20 MIN: CPT | Performed by: INTERNAL MEDICINE

## 2025-03-03 PROCEDURE — 1160F RVW MEDS BY RX/DR IN RCRD: CPT | Performed by: INTERNAL MEDICINE

## 2025-03-03 PROCEDURE — 3078F DIAST BP <80 MM HG: CPT | Performed by: INTERNAL MEDICINE

## 2025-03-03 PROCEDURE — 1126F AMNT PAIN NOTED NONE PRSNT: CPT | Performed by: INTERNAL MEDICINE

## 2025-03-31 ENCOUNTER — HOSPITAL ENCOUNTER (OUTPATIENT)
Dept: MAMMOGRAPHY | Facility: HOSPITAL | Age: 75
Discharge: HOME | End: 2025-03-31
Admitting: FAMILY MEDICINE
Payer: MEDICARE

## 2025-03-31 DIAGNOSIS — D05.11 DUCTAL CARCINOMA IN SITU OF RIGHT BREAST: ICD-10-CM

## 2025-03-31 DIAGNOSIS — Z12.31 ENCOUNTER FOR SCREENING MAMMOGRAM FOR BREAST CANCER: ICD-10-CM

## 2025-03-31 PROCEDURE — 77063 BREAST TOMOSYNTHESIS BI: CPT

## 2025-03-31 PROCEDURE — 77067 SCR MAMMO BI INCL CAD: CPT

## 2025-04-09 NOTE — PROGRESS NOTES
[facility] RADIATION ONCOLOGY  FOLLOW-UP    Name: Gi Velez  YOB: 1950  MRN #: [id number]  Date of Service: 4/10/2025    Chief Complaint:  Routine one year follow up     Diagnosis:   Encounter Diagnosis   Name Primary?    Breast neoplasm, Tis (DCIS), right Yes          Radiation Treatment Course       Treating Physician: Dr. Joe Sow     Start: 7/3/2023     End: 7/25/2023   Site: Rt Breast    Total Dose: 4256 cGy    Dose/Fraction: 266 cGy    Total Fractions: 16 Daily or BID: Daily  Modality: Photon  Technique: 3DCRT  Bolus: No       Interval History       Gi Velez is a 74 y.o. female  history of right breast DCIS, stage 0, [id number], ER/VA+, sp lumpectomy and adjuvant radiation radiation completed in July of 2023, 42.56 Gy in 16 fx. She presents today for routine one year follow up.    Patient reports she is doing well. She has no new radiation related concerns or complaints.         Imaging       Mammo Screening Digital Tomosynthesis Bilateral With CAD  Result Date: 3/31/2025  No mammographic evidence of malignancy.  Recommend annual screening mammography.  BI-RADS ASSESSMENT: Category 2: Benign  Note:  It has been reported that there is approximately a 15% false negative rate in mammography.  Therefore, management of a palpable abnormality should not be deferred because of a negative mammogram.            Pathology       No new pathology to review.    Labs       Hematology:  WBC   Date Value Ref Range Status   03/03/2025 5.62 3.40 - 10.80 10*3/mm3 Final     RBC   Date Value Ref Range Status   03/03/2025 5.20 3.77 - 5.28 10*6/mm3 Final     Hemoglobin   Date Value Ref Range Status   03/03/2025 14.0 12.0 - 15.9 g/dL Final     Hematocrit   Date Value Ref Range Status   03/03/2025 44.9 34.0 - 46.6 % Final     Platelets   Date Value Ref Range Status   03/03/2025 88 (L) 140 - 450 10*3/mm3 Final     Chemistry:  Lab Results   Component Value Date    GLUCOSE 113 (H) 11/25/2024    BUN 19  11/25/2024    CREATININE 0.98 11/25/2024    EGFRIFNONA 56 (L) 02/07/2022    BCR 19.4 11/25/2024    K 3.9 11/25/2024    CO2 24.3 11/25/2024    CALCIUM 9.5 11/25/2024    ALBUMIN 3.8 11/25/2024    AST 28 11/25/2024    ALT 38 (H) 11/25/2024         Problem List       Patient Active Problem List   Diagnosis    Thrombocytopenia    Acute ITP    Vitamin B12 deficiency    Iron deficiency    Vitamin D deficiency    Obstructive sleep apnea    Hyperlipidemia    Goiter    Encounter for long-term (current) use of other medications    Diabetic peripheral neuropathy    Type 2 diabetes mellitus with diabetic polyneuropathy, with long-term current use of insulin    Nuclear cataract    Glaucoma suspect    Epiretinal membrane    Chronic fatigue    Insomnia    Overweight (BMI 25.0-29.9)    Initial Medicare annual wellness visit    Need for pneumococcal vaccination    Need for influenza vaccination    Closed fracture of left proximal humerus    Breast neoplasm, Tis (DCIS), right    Primary hypertension    Tachycardia    Medicare annual wellness visit, subsequent    History of incision and drainage    Wound drainage          Current Medications       Current Outpatient Medications   Medication Instructions    anastrozole (ARIMIDEX) 1 mg, Oral, Daily    atorvastatin (LIPITOR) 40 mg, Oral, Daily    benazepril (LOTENSIN) 10 mg, Oral, Daily    BIOTIN PO Take 1 dose by mouth Every Evening. Ld 5/18    Calcium Carbonate-Vit D-Min (CALCIUM 1200 PO) 1 capsule, Daily    Cholecalciferol 1000 units capsule Take 1 capsule by mouth Every Evening. Ld 5/18    CINNAMON PO Take 1,500 mg by mouth Every Night. Ld 5/18    clotrimazole-betamethasone (LOTRISONE) 1-0.05 % cream As Needed    Cyanocobalamin (B-12 PO) Take 1,000 mcg by mouth Every Evening. Ld 5/18    docusate sodium (COLACE) 100 mg, Daily PRN    ELDERBERRY PO 2 capsules, Daily    glimepiride (AMARYL) 4 MG tablet TAKE 2 TABLETS BY MOUTH EVERY MORNING AT BREAKFAST.    insulin aspart (NovoLOG FlexPen)  100 UNIT/ML solution pen-injector sc pen Inject 6 units ac tid plus 2 units if blood sugar >200. MDD 30 units.    Insulin Aspart, w/Niacinamide, (Fiasp FlexTouch) 100 UNIT/ML solution pen-injector Inject 6 units ac tid plus 2 units if blood sugar >200. MDD 30 units.    Insulin Pen Needle (1st Tier Unifine Pentips) 32G X 4 MM misc Use 4 daily to inject insulin. E11.42    Omega-3 Fatty Acids (FISH OIL PO) Take 1,200 mg by mouth Every Evening. Ld 5/18    ONE TOUCH ULTRA TEST test strip 1 each by Other route Daily.    pregabalin (LYRICA) 150 mg, Oral, 2 Times Daily    Tiadylt  mg, Oral, Daily    Tresiba FlexTouch 200 UNIT/ML solution pen-injector pen injection INJECT 22 UNITS UNDER THE SKIN INTO THE APPROPRIATE AREA AS DIRECTED DAILY. NONE PREOP          Allergies       Allergies   Allergen Reactions    Adhesive Tape Other (See Comments)     Blisters      Bee Venom Hives    Topamax [Topiramate] Hives           Review of Systems       Review of Systems   Constitutional:  Negative for activity change and fatigue.        Vitals:    04/10/25 0841   BP: 138/72   Pulse: 78   Resp: 18   SpO2: 96%      Physical Exam  Exam conducted with a chaperone present.   Constitutional:       General: She is not in acute distress.  HENT:      Head: Normocephalic and atraumatic.   Pulmonary:      Effort: Pulmonary effort is normal. No respiratory distress.   Chest:      Chest wall: No mass or tenderness.   Breasts:     Right: Normal. No mass.      Left: Normal. No mass.   Lymphadenopathy:      Upper Body:      Right upper body: No supraclavicular, axillary or pectoral adenopathy.      Left upper body: No supraclavicular, axillary or pectoral adenopathy.   Neurological:      Mental Status: She is alert and oriented to person, place, and time. Mental status is at baseline.   Psychiatric:         Mood and Affect: Mood normal.         Behavior: Behavior normal.            ECOG:  Fully active, able to carry on all pre-disease performance  without restriction = 0        Assessment and Plan       Assessment:      Gi Velez is a 74 y.o. female  history of right breast DCIS, stage 0, [id number], ER/GA+, sp lumpectomy and adjuvant radiation radiation completed in July of 2023, 42.56 Gy in 16 fx. She presents today for routine one year follow up.    Diagnoses and all orders for this visit:    1. Breast neoplasm, Tis (DCIS), right (Primary)       Plan:      Orders:  - Continue management, surveillance, and imaging with Med Onc  - Follow-up in 1 year or sooner as clinically indicated      The patient has no clinical or radiographic evidence of disease recurrence.  She continues to do well with no major radiation related complaints.  We discussed plan for continued surveillance and follow-up.  Will see the patient back in 1 year or sooner as clinically indicated.  The patient is encouraged to reach out with questions or concerns prior to her next appointment.    I spent 20 minutes caring for Gi on this date of service. This time includes time spent by me in the following activities:preparing for the visit, reviewing tests, obtaining and/or reviewing a separately obtained history, performing a medically appropriate examination and/or evaluation , counseling and educating the patient/family/caregiver, documenting information in the medical record, and independently interpreting results and communicating that information with the patient/family/caregiver        Follow-Up       No follow-ups on file.       CC: Mercedez Guevara*

## 2025-04-10 ENCOUNTER — OFFICE VISIT (OUTPATIENT)
Dept: RADIATION ONCOLOGY | Facility: HOSPITAL | Age: 75
End: 2025-04-10
Payer: MEDICARE

## 2025-04-10 VITALS
DIASTOLIC BLOOD PRESSURE: 72 MMHG | OXYGEN SATURATION: 96 % | WEIGHT: 150 LBS | HEART RATE: 78 BPM | RESPIRATION RATE: 18 BRPM | BODY MASS INDEX: 27.44 KG/M2 | SYSTOLIC BLOOD PRESSURE: 138 MMHG

## 2025-04-10 DIAGNOSIS — D05.11 BREAST NEOPLASM, TIS (DCIS), RIGHT: Primary | ICD-10-CM

## 2025-04-10 DIAGNOSIS — E11.42 TYPE 2 DIABETES MELLITUS WITH DIABETIC POLYNEUROPATHY, WITH LONG-TERM CURRENT USE OF INSULIN: Primary | ICD-10-CM

## 2025-04-10 DIAGNOSIS — Z79.4 TYPE 2 DIABETES MELLITUS WITH DIABETIC POLYNEUROPATHY, WITH LONG-TERM CURRENT USE OF INSULIN: Primary | ICD-10-CM

## 2025-04-10 PROCEDURE — G0463 HOSPITAL OUTPT CLINIC VISIT: HCPCS | Performed by: INTERNAL MEDICINE

## 2025-04-25 DIAGNOSIS — E11.42 DIABETIC PERIPHERAL NEUROPATHY: ICD-10-CM

## 2025-04-25 RX ORDER — PREGABALIN 150 MG/1
150 CAPSULE ORAL 2 TIMES DAILY
Qty: 180 CAPSULE | Refills: 1 | Status: SHIPPED | OUTPATIENT
Start: 2025-04-25

## 2025-04-25 NOTE — TELEPHONE ENCOUNTER
Caller: Rosie Gi H    Relationship: Self    Best call back number: [phone]     Requested Prescriptions:   Requested Prescriptions     Pending Prescriptions Disp Refills    pregabalin (LYRICA) 150 MG capsule 180 capsule 1     Sig: Take 1 capsule by mouth 2 (Two) Times a Day.        Pharmacy where request should be sent: [facility]ARMACY #[id number] - [facility] [place] [address] [phone] [facility] [phone]      Last office visit with prescribing clinician: 2/24/2025   Last telemedicine visit with prescribing clinician: Visit date not found   Next office visit with prescribing clinician: 7/23/2025     Additional details provided by patient: 2 DAY SUPPLY ON HAND    Does the patient have less than a 3 day supply:  [x] Yes  [] No    Would you like a call back once the refill request has been completed: [] Yes [] No    If the office needs to give you a call back, can they leave a voicemail: [] Yes [] No    Gela Lindsey Rep   04/25/25 10:14 EDT

## 2025-04-28 ENCOUNTER — OFFICE VISIT (OUTPATIENT)
Dept: CARDIOLOGY | Facility: CLINIC | Age: 75
End: 2025-04-28
Payer: MEDICARE

## 2025-04-28 VITALS
HEIGHT: 62 IN | WEIGHT: 149 LBS | BODY MASS INDEX: 27.42 KG/M2 | SYSTOLIC BLOOD PRESSURE: 126 MMHG | DIASTOLIC BLOOD PRESSURE: 57 MMHG | OXYGEN SATURATION: 99 % | HEART RATE: 75 BPM

## 2025-04-28 DIAGNOSIS — E11.9 TYPE 2 DIABETES MELLITUS WITHOUT COMPLICATION, WITHOUT LONG-TERM CURRENT USE OF INSULIN: ICD-10-CM

## 2025-04-28 DIAGNOSIS — G47.33 OBSTRUCTIVE SLEEP APNEA: ICD-10-CM

## 2025-04-28 DIAGNOSIS — E78.00 PURE HYPERCHOLESTEROLEMIA: ICD-10-CM

## 2025-04-28 DIAGNOSIS — I10 PRIMARY HYPERTENSION: Primary | ICD-10-CM

## 2025-04-28 PROCEDURE — 1159F MED LIST DOCD IN RCRD: CPT | Performed by: INTERNAL MEDICINE

## 2025-04-28 PROCEDURE — 1160F RVW MEDS BY RX/DR IN RCRD: CPT | Performed by: INTERNAL MEDICINE

## 2025-04-28 PROCEDURE — 3074F SYST BP LT 130 MM HG: CPT | Performed by: INTERNAL MEDICINE

## 2025-04-28 PROCEDURE — G2211 COMPLEX E/M VISIT ADD ON: HCPCS | Performed by: INTERNAL MEDICINE

## 2025-04-28 PROCEDURE — 3078F DIAST BP <80 MM HG: CPT | Performed by: INTERNAL MEDICINE

## 2025-04-28 PROCEDURE — 99214 OFFICE O/P EST MOD 30 MIN: CPT | Performed by: INTERNAL MEDICINE

## 2025-04-28 NOTE — PROGRESS NOTES
Subjective:     Encounter Date:04/28/2025      Patient ID: Gi Velez is a 75 y.o. female.    Chief Complaint:  Hypertension  Pertinent negatives include no chest pain, headaches, malaise/fatigue, palpitations or shortness of breath.   75-year-old white female with history of hypertension hyperlipidemia diabetes and sleep apnea presents to my office for a follow-up.  Patient is currently stable without any symptoms of chest pain or shortness of breath at rest on exertion.  No complaint of any PND orthopnea.  No palpitation dizziness syncope or swelling of the feet.  She does not smoke.  She is not watching her diet very well    The following portions of the patient's history were reviewed and updated as appropriate: allergies, current medications, past family history, past medical history, past social history, past surgical history, and problem list.  Past Medical History:   Diagnosis Date    Allergies     Breast cancer     Diabetes mellitus     diagnosed in the 1990's    Drug therapy     Dry skin     feet    Hx of radiation therapy     Hyperlipidemia 2000    Hypertension 2018    Insomnia     Irregular heart beat 2005    rare episode  no need to follow with cardiology    Left supracondylar humerus fracture, closed, initial encounter 9/29/2022    Lymphocytosis 12/2009    Neuropathy     diagnosed in the 1990's    Sleep apnea     no machine    Thrombocytopenia 12/2009    low normal current    Type 2 diabetes mellitus 1998     Past Surgical History:   Procedure Laterality Date    BREAST BIOPSY      BREAST LUMPECTOMY Right 05/25/2023    Procedure: WIRE LOCALIZED RIGHT LUMPECTOMY;  Surgeon: Joe Ruiz MD;  Location: [facility];  Service: General;  Laterality: Right;    CATARACT EXTRACTION  2024    CATARACT EXTRACTION, BILATERAL Bilateral 11/2024    COLONOSCOPY W/ BIOPSIES  07/2021    HYSTERECTOMY      in the 1980's-increased bleeding  bilateral oophorectomy    SHOULDER SURGERY Left 10/03/2022    and  "arm    SHOULDER SURGERY  2024    removal of metal plate and screws    TUBAL ABDOMINAL LIGATION      WISDOM TOOTH EXTRACTION       /57   Pulse 75   Ht 157.5 cm (62.01\")   Wt 67.6 kg (149 lb)   SpO2 99%   BMI 27.25 kg/m²   Family History   Adopted: Yes   Problem Relation Age of Onset    No Known Problems Other        Current Outpatient Medications:     anastrozole (ARIMIDEX) 1 MG tablet, Take 1 tablet by mouth Daily., Disp: 90 tablet, Rfl: 3    atorvastatin (LIPITOR) 40 MG tablet, TAKE 1 TABLET BY MOUTH EVERY DAY, Disp: 90 tablet, Rfl: 3    benazepril (LOTENSIN) 10 MG tablet, Take 1 tablet by mouth Daily., Disp: 90 tablet, Rfl: 3    BIOTIN PO, Take 1 dose by mouth Every Evening. Ld 5/18, Disp: , Rfl:     Calcium Carbonate-Vit D-Min (CALCIUM 1200 PO), Take 1 capsule by mouth Daily., Disp: , Rfl:     Cholecalciferol 1000 units capsule, Take 1 capsule by mouth Every Evening. Ld 5/18, Disp: , Rfl:     CINNAMON PO, Take 1,500 mg by mouth Every Night. Ld 5/18, Disp: , Rfl:     clotrimazole-betamethasone (LOTRISONE) 1-0.05 % cream, Apply 1 Application topically to the appropriate area as directed As Needed., Disp: , Rfl:     Cyanocobalamin (B-12 PO), Take 1,000 mcg by mouth Every Evening. Ld 5/18, Disp: , Rfl:     docusate sodium (COLACE) 100 MG capsule, Take 1 capsule by mouth Daily As Needed. None dos, Disp: , Rfl:     ELDERBERRY PO, Take 2 capsules by mouth Daily., Disp: , Rfl:     glimepiride (AMARYL) 4 MG tablet, TAKE 2 TABLETS BY MOUTH EVERY MORNING AT BREAKFAST., Disp: 180 tablet, Rfl: 3    insulin aspart (NovoLOG FlexPen) 100 UNIT/ML solution pen-injector sc pen, Inject 6 units ac tid plus 2 units if blood sugar >200. MDD 30 units., Disp: 30 mL, Rfl: 3    Insulin Aspart, w/Niacinamide, (Fiasp FlexTouch) 100 UNIT/ML solution pen-injector, Inject 6 units ac tid plus 2 units if blood sugar >200. MDD 30 units., Disp: 30 mL, Rfl: 3    insulin degludec (TRESIBA FLEXTOUCH) 100 UNIT/ML solution pen-injector " injection, INJECT 22 UNITS UNDER THE SKIN INTO THE APPROPRIATE AREA AS DIRECTED DAILY. NONE PREOP, Disp: 15 mL, Rfl: 3    Insulin Pen Needle (1st Tier Unifine Pentips) 32G X 4 MM misc, Use 4 daily to inject insulin. E11.42, Disp: 400 each, Rfl: 3    Omega-3 Fatty Acids (FISH OIL PO), Take 1,200 mg by mouth Every Evening. Ld 5/18, Disp: , Rfl:     ONE TOUCH ULTRA TEST test strip, 1 each by Other route Daily., Disp: , Rfl: 5    pregabalin (LYRICA) 150 MG capsule, Take 1 capsule by mouth 2 (Two) Times a Day., Disp: 180 capsule, Rfl: 1    Tiadylt  MG 24 hr capsule, Take 1 capsule by mouth Daily., Disp: 90 capsule, Rfl: 3  Allergies   Allergen Reactions    Adhesive Tape Other (See Comments)     Blisters      Bee Venom Hives    Topamax [Topiramate] Hives     Social History     Socioeconomic History    Marital status:     Number of children: 2    Years of education: 15   Tobacco Use    Smoking status: Never     Passive exposure: Never    Smokeless tobacco: Never   Vaping Use    Vaping status: Never Used   Substance and Sexual Activity    Alcohol use: Yes     Comment: rare occasion    Drug use: Never    Sexual activity: Defer     Review of Systems   Constitutional: Negative for malaise/fatigue.   Cardiovascular:  Negative for chest pain, dyspnea on exertion, leg swelling and palpitations.   Respiratory:  Negative for cough and shortness of breath.    Gastrointestinal:  Negative for abdominal pain, nausea and vomiting.   Neurological:  Negative for dizziness, headaches, light-headedness, numbness and weakness.   All other systems reviewed and are negative.             Objective:     Constitutional:       Appearance: Well-developed.   Eyes:      General: No scleral icterus.     Conjunctiva/sclera: Conjunctivae normal.   HENT:      Head: Normocephalic and atraumatic.   Neck:      Vascular: No carotid bruit or JVD.   Pulmonary:      Effort: Pulmonary effort is normal.      Breath sounds: Normal breath sounds. No  wheezing. No rales.   Cardiovascular:      Normal rate. Regular rhythm.   Pulses:     Intact distal pulses.   Abdominal:      General: Bowel sounds are normal.      Palpations: Abdomen is soft.   Musculoskeletal:      Cervical back: Normal range of motion and neck supple. Skin:     General: Skin is warm and dry.      Findings: No rash.   Neurological:      Mental Status: Alert.       Procedures    Lab Review:         MDM    #1 hypertension  Patient blood pressure currently stable on medications including benazepril  2.  Hyperlipidemia  Patient on atorvastatin and the lipid levels are well within normal meds  3.  Diabetes  Patient's sugars are high and hypomobile when A1c is high in spite of being on insulin and glimepiride and followed by the diabetologist  4.  Sleep apnea  Patient does not use CPAP machine    Patient's previous medical records, labs, and EKG were reviewed and discussed with the patient at today's visit.

## 2025-05-27 ENCOUNTER — LAB (OUTPATIENT)
Dept: ENDOCRINOLOGY | Facility: CLINIC | Age: 75
End: 2025-05-27
Payer: MEDICARE

## 2025-05-27 DIAGNOSIS — E11.42 TYPE 2 DIABETES MELLITUS WITH DIABETIC POLYNEUROPATHY, WITH LONG-TERM CURRENT USE OF INSULIN: ICD-10-CM

## 2025-05-27 DIAGNOSIS — E78.2 MIXED HYPERLIPIDEMIA: ICD-10-CM

## 2025-05-27 DIAGNOSIS — Z79.4 TYPE 2 DIABETES MELLITUS WITH DIABETIC POLYNEUROPATHY, WITH LONG-TERM CURRENT USE OF INSULIN: ICD-10-CM

## 2025-05-28 LAB
ALBUMIN SERPL-MCNC: 4.2 G/DL (ref 3.8–4.8)
ALP SERPL-CCNC: 105 IU/L (ref 44–121)
ALT SERPL-CCNC: 42 IU/L (ref 0–32)
AST SERPL-CCNC: 30 IU/L (ref 0–40)
BILIRUB SERPL-MCNC: 0.3 MG/DL (ref 0–1.2)
BUN SERPL-MCNC: 20 MG/DL (ref 8–27)
BUN/CREAT SERPL: 22 (ref 12–28)
CALCIUM SERPL-MCNC: 9.4 MG/DL (ref 8.7–10.3)
CHLORIDE SERPL-SCNC: 104 MMOL/L (ref 96–106)
CO2 SERPL-SCNC: 22 MMOL/L (ref 20–29)
CREAT SERPL-MCNC: 0.93 MG/DL (ref 0.57–1)
EGFRCR SERPLBLD CKD-EPI 2021: 63 ML/MIN/1.73
GLOBULIN SER CALC-MCNC: 2.4 G/DL (ref 1.5–4.5)
GLUCOSE SERPL-MCNC: 151 MG/DL (ref 70–99)
HBA1C MFR BLD: 8.6 % (ref 4.8–5.6)
POTASSIUM SERPL-SCNC: 4.7 MMOL/L (ref 3.5–5.2)
PROT SERPL-MCNC: 6.6 G/DL (ref 6–8.5)
SODIUM SERPL-SCNC: 140 MMOL/L (ref 134–144)

## 2025-05-29 ENCOUNTER — TELEPHONE (OUTPATIENT)
Dept: FAMILY MEDICINE CLINIC | Facility: CLINIC | Age: 75
End: 2025-05-29

## 2025-05-29 NOTE — TELEPHONE ENCOUNTER
Patient returned Kaila WEST's call. Kaila is gone for the day. Please return patient's call upon return to office tomorrow.

## 2025-06-03 ENCOUNTER — TELEPHONE (OUTPATIENT)
Dept: ENDOCRINOLOGY | Facility: CLINIC | Age: 75
End: 2025-06-03

## 2025-06-03 ENCOUNTER — OFFICE VISIT (OUTPATIENT)
Dept: SURGERY | Facility: CLINIC | Age: 75
End: 2025-06-03
Payer: MEDICARE

## 2025-06-03 ENCOUNTER — OFFICE VISIT (OUTPATIENT)
Dept: ENDOCRINOLOGY | Facility: CLINIC | Age: 75
End: 2025-06-03
Payer: MEDICARE

## 2025-06-03 VITALS
OXYGEN SATURATION: 95 % | HEIGHT: 62 IN | BODY MASS INDEX: 27.25 KG/M2 | RESPIRATION RATE: 18 BRPM | SYSTOLIC BLOOD PRESSURE: 152 MMHG | HEART RATE: 64 BPM | DIASTOLIC BLOOD PRESSURE: 68 MMHG | TEMPERATURE: 98.4 F | WEIGHT: 148.1 LBS

## 2025-06-03 VITALS
SYSTOLIC BLOOD PRESSURE: 120 MMHG | HEIGHT: 62 IN | HEART RATE: 75 BPM | WEIGHT: 147 LBS | OXYGEN SATURATION: 99 % | BODY MASS INDEX: 27.05 KG/M2 | DIASTOLIC BLOOD PRESSURE: 80 MMHG

## 2025-06-03 DIAGNOSIS — E11.42 TYPE 2 DIABETES MELLITUS WITH DIABETIC POLYNEUROPATHY, WITH LONG-TERM CURRENT USE OF INSULIN: Primary | ICD-10-CM

## 2025-06-03 DIAGNOSIS — E55.9 VITAMIN D DEFICIENCY: ICD-10-CM

## 2025-06-03 DIAGNOSIS — D05.11 BREAST NEOPLASM, TIS (DCIS), RIGHT: Primary | ICD-10-CM

## 2025-06-03 DIAGNOSIS — Z79.4 TYPE 2 DIABETES MELLITUS WITH DIABETIC POLYNEUROPATHY, WITH LONG-TERM CURRENT USE OF INSULIN: Primary | ICD-10-CM

## 2025-06-03 DIAGNOSIS — E78.2 MIXED HYPERLIPIDEMIA: ICD-10-CM

## 2025-06-03 PROCEDURE — 3078F DIAST BP <80 MM HG: CPT | Performed by: SURGERY

## 2025-06-03 PROCEDURE — 1159F MED LIST DOCD IN RCRD: CPT | Performed by: SURGERY

## 2025-06-03 PROCEDURE — 99213 OFFICE O/P EST LOW 20 MIN: CPT | Performed by: SURGERY

## 2025-06-03 PROCEDURE — 3074F SYST BP LT 130 MM HG: CPT | Performed by: INTERNAL MEDICINE

## 2025-06-03 PROCEDURE — 99214 OFFICE O/P EST MOD 30 MIN: CPT | Performed by: INTERNAL MEDICINE

## 2025-06-03 PROCEDURE — 1160F RVW MEDS BY RX/DR IN RCRD: CPT | Performed by: SURGERY

## 2025-06-03 PROCEDURE — 3079F DIAST BP 80-89 MM HG: CPT | Performed by: INTERNAL MEDICINE

## 2025-06-03 PROCEDURE — 3077F SYST BP >= 140 MM HG: CPT | Performed by: SURGERY

## 2025-06-03 RX ORDER — MAGNESIUM CARB/ALUMINUM HYDROX 105-160MG
TABLET,CHEWABLE ORAL ONCE
COMMUNITY

## 2025-06-03 RX ORDER — MV-MIN NO.113/IRON/FOLIC ACID 4.5 MG-2
CAPSULE ORAL
COMMUNITY

## 2025-06-03 RX ORDER — VIT C/B6/B5/MAGNESIUM/HERB 173 50-5-6-5MG
CAPSULE ORAL
COMMUNITY

## 2025-06-03 NOTE — PATIENT INSTRUCTIONS
Keep up the good work!  See eye doctor in Sept as scheduled.  Continue exercise.  Take Novolog 15 min before eating.  Continue diabetes & lipid meds & calcium+D.  Call if blood sugars are running under 100 or over 200.  F/u in 6 months, with fasting labs prior.

## 2025-06-03 NOTE — PROGRESS NOTES
GENERAL SURGERY ESTABLISHED PATIENT NOTE    Patient Care Team:  Mercedez Guevara MD as PCP - General (Family Medicine)  Joe Ruiz MD as Surgeon (General Surgery)  Vic Cerna MD as Consulting Physician (Endocrinology)  Monty Nation MD as Consulting Physician (Ophthalmology)  Yonatan Fernandez Jr., DPM as Consulting Physician (Podiatry)  Uriel Montenegro MD as Consulting Physician (Orthopedic Surgery)  Elias Coles MD as Consulting Physician (Radiation Oncology)  Jeffy Noyola MD as Consulting Physician (Cardiology)  Mickey Mir MD as Consulting Physician (Hematology and Oncology)    Reason for follow-up: 6-month follow-up for right breast DCIS    Subjective     Patient is a 75 y.o. female presents for 6-month follow-up for right breast DCIS.  The patient underwent right wire localized lumpectomy on 5/25/2023.  She completed 16 treatments of radiation therapy without any complaints.  She was following up with Dr. Cabral for endocrine therapy, and now follows up at the [facility] [place] with Dr. Mir.  She states that she has issues with neuropathy in her feet and diffuse arthritis which is improving with taking turmeric, lipoic acid, and magnesium citrate.  She is pleased with her cosmetic outcome, has no issues with range of motion or pain.  Since I last saw her there has been no significant changes to her past medical, surgical, or social history.  She did have a routine screening mammogram performed at the end of March which demonstrated only benign findings.    Review of Systems   Genitourinary:  Negative for breast discharge, breast lump and breast pain.   Hematological:  Negative for adenopathy. Does not bruise/bleed easily.        History  Past Medical History:   Diagnosis Date    Allergies     Breast cancer     Diabetes mellitus     diagnosed in the 1990's    Drug therapy     Dry skin     feet    Hx of radiation therapy     Hyperlipidemia  2000    Hypertension 2018    Insomnia     Irregular heart beat 2005    rare episode  no need to follow with cardiology    Left supracondylar humerus fracture, closed, initial encounter 9/29/2022    Lymphocytosis 12/2009    Neuropathy     diagnosed in the 1990's    Sleep apnea     no machine    Thrombocytopenia 12/2009    low normal current    Type 2 diabetes mellitus 1998     Past Surgical History:   Procedure Laterality Date    BREAST BIOPSY      BREAST LUMPECTOMY Right 05/25/2023    Procedure: WIRE LOCALIZED RIGHT LUMPECTOMY;  Surgeon: Joe Ruiz MD;  [facility] MAIN OR;  Service: General;  Laterality: Right;    CATARACT EXTRACTION  2024    CATARACT EXTRACTION, BILATERAL Bilateral 11/2024    COLONOSCOPY W/ BIOPSIES  07/2021    HYSTERECTOMY      in the 1980's-increased bleeding  bilateral oophorectomy    SHOULDER SURGERY Left 10/03/2022    and arm    SHOULDER SURGERY  2024    removal of metal plate and screws    TUBAL ABDOMINAL LIGATION      WISDOM TOOTH EXTRACTION       Family History   Adopted: Yes   Problem Relation Age of Onset    No Known Problems Other      Social History     Tobacco Use    Smoking status: Never     Passive exposure: Never    Smokeless tobacco: Never   Vaping Use    Vaping status: Never Used   Substance Use Topics    Alcohol use: Yes     Comment: rare occasion    Drug use: Never     (Not in a hospital admission)    Allergies:  Adhesive tape, Bee venom, and Topamax [topiramate]    Objective     Vital Signs  Temp:  [98.4 °F (36.9 °C)] 98.4 °F (36.9 °C)  Heart Rate:  [64] 64  Resp:  [18] 18  BP: (152)/(68) 152/68    Physical Exam  Vitals reviewed. Exam conducted with a chaperone present.   Constitutional:       Appearance: She is well-developed.   HENT:      Head: Normocephalic and atraumatic.   Eyes:      Pupils: Pupils are equal, round, and reactive to light.   Cardiovascular:      Rate and Rhythm: Normal rate and regular rhythm.   Pulmonary:      Effort: Pulmonary effort  is normal.      Breath sounds: Normal breath sounds.   Chest:      Comments: Well-healed transverse right breast incision without any surrounding erythema, induration, or drainage to suggest infection.  Fine fibrocystic changes scattered bilaterally.  No overlying skin changes, no discharge from the nipple bilaterally.  Abdominal:      General: There is no distension.      Palpations: Abdomen is soft.      Tenderness: There is no abdominal tenderness.      Hernia: No hernia is present.   Musculoskeletal:         General: Normal range of motion.      Cervical back: Normal range of motion.   Lymphadenopathy:      Cervical: No cervical adenopathy.      Upper Body:      Right upper body: No supraclavicular or axillary adenopathy.      Left upper body: No supraclavicular or axillary adenopathy.   Skin:     General: Skin is warm and dry.      Findings: No rash.   Neurological:      Mental Status: She is alert and oriented to person, place, and time.         Results Review:   Lab Results (last 24 hours)       ** No results found for the last 24 hours. **          No radiology results for the last day      I reviewed the patient's new imaging results and agree with the interpretation.  I reviewed the patient's other test results and agree with the interpretation    Assessment & Plan   Right breast DCIS    Patient seen and examined.  Most recent screening mammogram performed on 3/31/2025 reviewed with patient which demonstrated no mammographic signs of malignancy and routine screening mammography recommended.  No obvious signs of recurrence  Follow-up with medical oncology as directed.  Continue endocrine therapy and screening mammography as directed  Follow-up with radiation oncology as directed  The patient has followed up with me for 2 years without any evidence of recurrence and at this point may follow-up with me as needed.    I discussed the patients findings and my recommendations with the patient.     Joe Cole  MD Sara  06/03/25  08:41 EDT

## 2025-06-09 NOTE — PROGRESS NOTES
[facility]        Patient Care Team:  Mercedez Guevara MD as PCP - General (Family Medicine)  Joe Ruiz MD as Surgeon (General Surgery)  Vic Cerna MD as Consulting Physician (Endocrinology)  Monty Nation MD as Consulting Physician (Ophthalmology)  Yonatan Fernandez Jr., JOAN as Consulting Physician (Podiatry)  Uriel Montenegro MD as Consulting Physician (Orthopedic Surgery)  Elias Coles MD as Consulting Physician (Radiation Oncology)  Jeffy Noyola MD as Consulting Physician (Cardiology)  Mickey Mir MD as Consulting Physician (Hematology and Oncology)    Chief Complaint:    Chief Complaint   Patient presents with    Diabetes     F/u DM2 Dale with reader, bl sugar 115 5hpp         Subjective   Here for diabetes f/u  Blood sugars: high 100's. Using Dale CGM  Pt has agreed to wear the CGM device and share readings on a regular basis with our office  Exercise program: peddling 2x/d  Taking calcium+ D & Omega 3    Interval History:     Patient Comments: eye appt in Sept  Patient Denies: hypoglycemia  History taken from: patient    Review of Systems:   Review of Systems   HENT:  Positive for hearing loss.    Eyes:  Negative for blurred vision.   Gastrointestinal:  Negative for nausea.   Endocrine: Negative for polyuria.   Neurological:  Negative for headache.   Lost  3 lb since last visit    Objective     Vital Signs     Vitals:    06/03/25 1043   BP: 120/80   Pulse: 75   SpO2: 99%         Physical Exam:     General Appearance:    Alert, cooperative, in no acute distress   Head:    Normocephalic, without obvious abnormality, atraumatic   Eyes:            Lids and lashes normal, conjunctivae and sclerae normal, no   icterus, no pallor, corneas clear, PERRLA   Throat:   No oral lesions,  oral mucosa moist   Neck:   36.5 cm in circumference, thyroid firm, no carotid bruit   Lungs:     Clear     Heart:    Regular rhythm and normal rate   Chest Wall:     No abnormalities observed   Abdomen:     Normal bowel sounds, soft                 Extremities:   Moves all extremities well, no edema               Pulses:   Pulses palpable and equal bilaterally   Skin:   Dry. Plantar calluses   Neurologic:  DTR absent, able to feel the 10g monofilament          Results Review:    I have reviewed the patient's new clinical results, labs & imaging.    Medication Review:   Prior to Admission medications    Medication Sig Start Date End Date Taking? Authorizing Provider   Alpha Lipoic Acid 200 MG capsule Take  by mouth.   Yes Ros Shah MD   Alpha-Lipoic Acid (LIPOIC ACID PO) Take  by mouth.   Yes Ros Shah MD   anastrozole (ARIMIDEX) 1 MG tablet Take 1 tablet by mouth Daily. 9/11/24  Yes Mickey Mir MD   atorvastatin (LIPITOR) 40 MG tablet TAKE 1 TABLET BY MOUTH EVERY DAY 7/29/24  Yes Mercedez Guevara MD   benazepril (LOTENSIN) 10 MG tablet Take 1 tablet by mouth Daily. 9/6/24  Yes Mercedez Guevara MD   BIOTIN PO Take 1 dose by mouth Every Evening. Ld 5/18 4/10/18  Yes Ros Shah MD   Calcium Carbonate-Vit D-Min (CALCIUM 1200 PO) Take 1 capsule by mouth Daily.   Yes Ros Shah MD   Cholecalciferol 1000 units capsule Take 1 capsule by mouth Every Evening. Ld 5/18 3/31/15  Yes Ros Shah MD   CINNAMON PO Take 1,500 mg by mouth Every Night. Ld 5/18 4/10/18  Yes Ros Shah MD   clotrimazole-betamethasone (LOTRISONE) 1-0.05 % cream Apply 1 Application topically to the appropriate area as directed As Needed. 12/26/21  Yes Ros Shah MD   Cyanocobalamin (B-12 PO) Take 1,000 mcg by mouth Every Evening. Ld 5/18 4/10/18  Yes Ros Shah MD   docusate sodium (COLACE) 100 MG capsule Take 1 capsule by mouth Daily As Needed. None dos   Yes Ros Shah MD   ELDERBERRY PO Take 2 capsules by mouth Daily.   Yes Ros Shah MD   glimepiride (AMARYL) 4 MG tablet TAKE 2  TABLETS BY MOUTH EVERY MORNING AT BREAKFAST. 7/25/24  Yes Vic Cerna MD   insulin aspart (NovoLOG FlexPen) 100 UNIT/ML solution pen-injector sc pen Inject 6 units ac tid plus 2 units if blood sugar >200. MDD 30 units. 7/11/24  Yes Vic Cerna MD   Insulin Aspart, w/Niacinamide, (Fiasp FlexTouch) 100 UNIT/ML solution pen-injector Inject 6 units ac tid plus 2 units if blood sugar >200. MDD 30 units. 6/4/24  Yes Vic Cerna MD   insulin degludec (TRESIBA FLEXTOUCH) 100 UNIT/ML solution pen-injector injection INJECT 22 UNITS UNDER THE SKIN INTO THE APPROPRIATE AREA AS DIRECTED DAILY. NONE PREOP 4/10/25  Yes Vic Cerna MD   Insulin Pen Needle (1st Tier Unifine Pentips) 32G X 4 MM misc Use 4 daily to inject insulin. E11.42 12/5/24  Yes Vic Cerna MD   magnesium citrate 1.745 GM/30ML solution solution Take  by mouth 1 (One) Time.   Yes Ros Shah MD   MAGNESIUM CITRATE PO Take  by mouth.   Yes Ros Shah MD   Misc Natural Products (TUMERSAID PO) Take  by mouth.   Yes Ros Shah MD   Omega-3 Fatty Acids (FISH OIL PO) Take 1,200 mg by mouth Every Evening. Ld 5/18 4/11/17  Yes Ros Shah MD   ONE TOUCH ULTRA TEST test strip 1 each by Other route Daily. 6/19/19  Yes Ros Shah MD   pregabalin (LYRICA) 150 MG capsule Take 1 capsule by mouth 2 (Two) Times a Day. 4/25/25  Yes Mercedez Guevara MD   Tiadylt  MG 24 hr capsule Take 1 capsule by mouth Daily. 7/23/24  Yes Mercedez Guevara MD   Turmeric (QC Tumeric Complex) 500 MG capsule Take  by mouth.   Yes Ros Shah MD         Lab Results   Component Value Date    HGBA1C 8.6 (H) 05/27/2025    HGBA1C 7.9 (A) 02/24/2025    HGBA1C 8.71 (H) 11/25/2024      Lab Results   Component Value Date    GLUCOSE 151 (H) 05/27/2025    BUN 20 05/27/2025    CREATININE 0.93 05/27/2025    EGFRIFNONA 56 (L) 02/07/2022    BCR 22 05/27/2025    K 4.7 05/27/2025    CO2 22  05/27/2025    CALCIUM 9.4 05/27/2025    ALBUMIN 4.2 05/27/2025    AST 30 05/27/2025    ALT 42 (H) 05/27/2025    CHOL 173 11/25/2024     (H) 11/25/2024    HDL 33 (L) 11/25/2024    TRIG 202 (H) 11/25/2024     Lab Results   Component Value Date    TSH 4.480 (H) 11/25/2024    PGZG59AZ 36.0 11/25/2024       Assessment & Plan     Diagnoses and all orders for this visit:    1. Type 2 diabetes mellitus with diabetic polyneuropathy, with long-term current use of insulin (Primary)  -     Hemoglobin A1c; Future  -     Microalbumin / Creatinine Urine Ratio - Urine, Clean Catch; Future  -     Lipid Panel; Future    2. Mixed hyperlipidemia  -     Comprehensive Metabolic Panel; Future  -     Lipid Panel; Future  -     TSH; Future    3. Vitamin D deficiency  -     Vitamin D,25-Hydroxy; Future    Glucose control not @ goal. Will check lipid & vit D next visit.    See eye doctor in Sept as scheduled.  Continue exercise.  Take Novolog 15 min before eating.  Continue diabetes & lipid meds & calcium+D.  Call if blood sugars are running under 100 or over 200.        Vic Cerna MD  06/08/25  21:04 EDT

## 2025-07-07 NOTE — PROGRESS NOTES
"                         HEMATOLOGY ONCOLOGY OUTPATIENT FOLLOWUP       Patient name: Gi Velez  : 1950  MRN: [id number]  Primary Care Physician: Mercedez Guevara MD  Referring Physician: No ref. provider found  Reason For Consult: ITP, B12 low, anemia, DCIS      History of Present Illness:  Patient is a 75 y.o. female with history of immune thrombocytopenia, B12 deficiency, anemia, DCIS.    2009 patient had a initial bone marrow biopsy which showed normocellular bone marrow with low iron stores, she had a low B12 level at 289, at that point her hemoglobin was 13, platelet count was 91,000  She has been on supplementation with B12 orally    Patient also has a history of DCIS  2023 stereotactic biopsy of the right breast with pathology revealing hide grade ductal carcinoma in situ, ER/OK positive  2023 patient had breast lumpectomy final path with residual DCIS, completely excised  2023 patient completed radiation treatment 16 fractions of 266 cGy per fraction  2023 patient started on anastrozole 1 mg daily along with calcium and vitamin D 600 mg daily  23 - DEXA  normal.    3/2024 mammogram benign.    2024: Has been feeling reasonably well.  Complains only of fatigue that she attributes to her \"blood disorder\".  She seemed to believe that her hemoglobin and hematocrit were abnormal but they have not been for some time.  She does persist with thrombocytopenia that has been investigated before.  She has been eating well and maintains a good appetite.  Her weight has been stable.  She has been afebrile.  Complains of back pain that is a chronic problem that has not changed recently.  She has no other deep bone pains.  No dyspnea.  Her chest pains have been occasional but have been investigated by cardiology.  No abdominal pain or diarrhea and no dysuria.  On exam alert and conversant.  In no distress.  The lungs are clear bilaterally.  No palpable lymphadenopathy in " the neck or axillary spaces.  The lungs are clear and the abdomen soft.  No edema.  Laboratory exams reviewed and discussed with her.  To see me in approximately 4 months.  Continue with the same medication.  Sent new prescriptions.  Mammogram and DEXA scan in 2025.    3/3/2025: Felling reasonably well. No new symptoms. Denies being energetic in the mornings, but she has no limitations. Sh eats well and her weight has been stable. No dyspnea but she has occasional chest pains for which she has been seen by cardiology. Denies abdominal pain. Has had occasional diarrhea, improved since she was taken off metformin. No dysuria. No edema. On exam alert and conversant. Oriented and in no distress. No jaundice. No oral lesions and no palpable adenopathy in the neck, supraclavicular areas and axillar spaces. Lungs clear and heart regular. Abdomen rounded but soft and free of hepatomegaly or splenomegaly. No edema. Reviewed the laboratory exams and discussed with her. To continue with the same and see me in 4 months. Mammogram later this month and Dexa in August of this year.     7/8/2025: Has been feeling reasonably well.  Continues to remain active with good energy level.  She reports no new limitations.  Continues to eat well and maintains a stable weight.  Denies fever, chills.  Denies chest pain or abdominal pain.  Denies chronic back pain that is no worse and reports no other new bone pains.  She continues anastrozole with good compliance and good tolerance.  On exam, alert and conversant.  Appears in good spirits.  No distress.  No jaundice nor pallor noted.  Lungs clear and heart regular.  Abdomen soft and nontender.  No edema noted.  Reviewed laboratory exams.  Persistent thrombocytopenia that has been evaluated previously.  Reviewed mammogram from March 2025 which shows no evidence of recurrent malignancy.  She is due for bone density in August of this year.      Past Medical History:   Diagnosis Date    Allergies      Breast cancer     Diabetes mellitus     diagnosed in the 1990's    Drug therapy     Dry skin     feet    Hx of radiation therapy     Hyperlipidemia 2000    Hypertension 2018    Insomnia     Irregular heart beat 2005    rare episode  no need to follow with cardiology    Left supracondylar humerus fracture, closed, initial encounter 9/29/2022    Lymphocytosis 12/2009    Neuropathy     diagnosed in the 1990's    Sleep apnea     no machine    Thrombocytopenia 12/2009    low normal current    Type 2 diabetes mellitus 1998     Past Surgical History:   Procedure Laterality Date    BREAST BIOPSY      BREAST LUMPECTOMY Right 05/25/2023    Procedure: WIRE LOCALIZED RIGHT LUMPECTOMY;  Surgeon: Joe Ruiz MD;  Location: [facility] MAIN OR;  Service: General;  Laterality: Right;    CATARACT EXTRACTION  2024    CATARACT EXTRACTION, BILATERAL Bilateral 11/2024    COLONOSCOPY W/ BIOPSIES  07/2021    HYSTERECTOMY      in the 1980's-increased bleeding  bilateral oophorectomy    SHOULDER SURGERY Left 10/03/2022    and arm    SHOULDER SURGERY  2024    removal of metal plate and screws    TUBAL ABDOMINAL LIGATION      WISDOM TOOTH EXTRACTION         Current Outpatient Medications:     Alpha Lipoic Acid 200 MG capsule, Take  by mouth., Disp: , Rfl:     anastrozole (ARIMIDEX) 1 MG tablet, Take 1 tablet by mouth Daily., Disp: 90 tablet, Rfl: 3    atorvastatin (LIPITOR) 40 MG tablet, TAKE 1 TABLET BY MOUTH EVERY DAY, Disp: 90 tablet, Rfl: 3    benazepril (LOTENSIN) 10 MG tablet, Take 1 tablet by mouth Daily., Disp: 90 tablet, Rfl: 3    BIOTIN PO, Take 1 dose by mouth Every Evening. Ld 5/18, Disp: , Rfl:     Calcium Carbonate-Vit D-Min (CALCIUM 1200 PO), Take 1 capsule by mouth Daily., Disp: , Rfl:     Cholecalciferol 1000 units capsule, Take 1 capsule by mouth Every Evening. Ld 5/18, Disp: , Rfl:     CINNAMON PO, Take 1,500 mg by mouth Every Night. Ld 5/18, Disp: , Rfl:     clotrimazole-betamethasone (LOTRISONE) 1-0.05 % cream,  Apply 1 Application topically to the appropriate area as directed As Needed., Disp: , Rfl:     Cyanocobalamin (B-12 PO), Take 1,000 mcg by mouth Every Evening. Ld 5/18, Disp: , Rfl:     docusate sodium (COLACE) 100 MG capsule, Take 1 capsule by mouth Daily As Needed. None dos, Disp: , Rfl:     ELDERBERRY PO, Take 2 capsules by mouth Daily., Disp: , Rfl:     glimepiride (AMARYL) 4 MG tablet, TAKE 2 TABLETS BY MOUTH EVERY MORNING AT BREAKFAST., Disp: 180 tablet, Rfl: 3    insulin aspart (NovoLOG FlexPen) 100 UNIT/ML solution pen-injector sc pen, Inject 6 units ac tid plus 2 units if blood sugar >200. MDD 30 units., Disp: 30 mL, Rfl: 3    Insulin Aspart, w/Niacinamide, (Fiasp FlexTouch) 100 UNIT/ML solution pen-injector, Inject 6 units ac tid plus 2 units if blood sugar >200. MDD 30 units., Disp: 30 mL, Rfl: 3    insulin degludec (TRESIBA FLEXTOUCH) 100 UNIT/ML solution pen-injector injection, INJECT 22 UNITS UNDER THE SKIN INTO THE APPROPRIATE AREA AS DIRECTED DAILY. NONE PREOP, Disp: 15 mL, Rfl: 3    Insulin Pen Needle (1st Tier Unifine Pentips) 32G X 4 MM misc, Use 4 daily to inject insulin. E11.42, Disp: 400 each, Rfl: 3    MAGNESIUM CITRATE PO, Take  by mouth., Disp: , Rfl:     Misc Natural Products (TUMERSAID PO), Take  by mouth., Disp: , Rfl:     Omega-3 Fatty Acids (FISH OIL PO), Take 1,200 mg by mouth Every Evening. Ld 5/18, Disp: , Rfl:     ONE TOUCH ULTRA TEST test strip, 1 each by Other route Daily., Disp: , Rfl: 5    pregabalin (LYRICA) 150 MG capsule, Take 1 capsule by mouth 2 (Two) Times a Day., Disp: 180 capsule, Rfl: 1    Tiadylt  MG 24 hr capsule, Take 1 capsule by mouth Daily., Disp: 90 capsule, Rfl: 3    Turmeric (QC Tumeric Complex) 500 MG capsule, Take  by mouth., Disp: , Rfl:     Allergies   Allergen Reactions    Adhesive Tape Other (See Comments)     Blisters      Bee Venom Hives    Topamax [Topiramate] Hives     Family History   Adopted: Yes   Problem Relation Age of Onset    No Known  "Problems Other      Cancer-related family history is not on file. She was adopted.    Social History     Tobacco Use    Smoking status: Never     Passive exposure: Never    Smokeless tobacco: Never   Vaping Use    Vaping status: Never Used   Substance Use Topics    Alcohol use: Yes     Comment: rare occasion    Drug use: Never     Social History     Social History Narrative    Not on file     ROS:   Review of Systems   Constitutional:  Positive for fatigue. Negative for chills and fever.   HENT:  Negative for congestion, ear pain, mouth sores, nosebleeds, sore throat and voice change.    Eyes:  Negative for photophobia and visual disturbance.   Respiratory:  Negative for cough, chest tightness, shortness of breath, wheezing and stridor.    Cardiovascular:  Negative for chest pain, palpitations and leg swelling.   Gastrointestinal:  Negative for abdominal distention, abdominal pain, blood in stool, diarrhea, nausea and vomiting.   Endocrine: Negative for cold intolerance and heat intolerance.   Genitourinary:  Negative for dysuria and hematuria.   Musculoskeletal:  Negative for arthralgias, joint swelling, neck pain and neck stiffness.   Skin:  Negative for color change, pallor, rash and wound.   Neurological:  Negative for seizures and syncope.   Hematological:  Negative for adenopathy.        No obvious bleeding   Psychiatric/Behavioral:  Negative for agitation, confusion and hallucinations.      Objective:    Vital Signs:  Vitals:    07/08/25 0908   BP: 129/69   Pulse: 60   Temp: 97.9 °F (36.6 °C)   TempSrc: Oral   SpO2: 96%   Weight: 67.2 kg (148 lb 3.2 oz)   Height: 157.5 cm (62.01\")   PainSc: 0-No pain       Body mass index is 27.1 kg/m².    ECOG  (0) Fully active, able to carry on all predisease performance without restriction       Physical Exam  Vitals reviewed.   Constitutional:       General: She is not in acute distress.     Appearance: Normal appearance. She is not diaphoretic.   HENT:      Head: " Normocephalic and atraumatic.   Eyes:      General: No scleral icterus.        Right eye: No discharge.         Left eye: No discharge.      Conjunctiva/sclera: Conjunctivae normal.   Neck:      Thyroid: No thyromegaly.   Cardiovascular:      Rate and Rhythm: Normal rate and regular rhythm.      Pulses: Normal pulses.      Heart sounds: Normal heart sounds. No murmur heard.     No friction rub. No gallop.   Pulmonary:      Effort: Pulmonary effort is normal. No respiratory distress.      Breath sounds: Normal breath sounds. No stridor. No wheezing.   Chest:      Comments: Patient declines breast exam today  Abdominal:      General: Bowel sounds are normal. There is no distension.      Palpations: Abdomen is soft. There is no mass.      Tenderness: There is no abdominal tenderness. There is no guarding or rebound.   Musculoskeletal:         General: No tenderness. Normal range of motion.      Cervical back: Normal range of motion and neck supple.   Lymphadenopathy:      Cervical: No cervical adenopathy.   Skin:     General: Skin is warm.      Coloration: Skin is not jaundiced or pale.      Findings: No bruising, erythema, lesion or rash.   Neurological:      General: No focal deficit present.      Mental Status: She is alert and oriented to person, place, and time.      Motor: No weakness or abnormal muscle tone.   Psychiatric:         Mood and Affect: Mood normal.         Behavior: Behavior normal.         Thought Content: Thought content normal.         Judgment: Judgment normal.         Lab Results - Last 18 Months   Lab Units 07/08/25  0912 03/03/25  1058 09/11/24  0852   WBC 10*3/mm3 6.60 5.62 5.62   HEMOGLOBIN g/dL 13.4 14.0 13.6   HEMATOCRIT % 41.8 44.9 42.8   PLATELETS 10*3/mm3 105* 88* 101*   MCV fL 86.7 86.3 85.9     Lab Results - Last 18 Months   Lab Units 05/27/25  0849 11/25/24  0833 09/11/24  0852   SODIUM mmol/L 140 145 143  143   POTASSIUM mmol/L 4.7 3.9 4.1  4.1   CHLORIDE mmol/L 104 109* 106  107    CO2 mmol/L 22 24.3 24.6  25.2   BUN mg/dL 20 19 25*  26*   CREATININE mg/dL 0.93 0.98 1.23*  1.28*   CALCIUM mg/dL 9.4 9.5 9.7  9.6   BILIRUBIN mg/dL 0.3 0.2 0.4   ALK PHOS IU/L 105 95 99   ALT (SGPT) IU/L 42* 38* 55*   AST (SGOT) IU/L 30 28 32   GLUCOSE mg/dL 151* 113* 215*  215*     Lab Results   Component Value Date    GLUCOSE 151 (H) 05/27/2025    BUN 20 05/27/2025    CREATININE 0.93 05/27/2025    EGFRIFNONA 56 (L) 02/07/2022    BCR 22 05/27/2025    K 4.7 05/27/2025    CO2 22 05/27/2025    CALCIUM 9.4 05/27/2025    ALBUMIN 4.2 05/27/2025    AST 30 05/27/2025    ALT 42 (H) 05/27/2025     Lab Results   Component Value Date    IRON 117 04/04/2017    TIBC 504 (H) 04/04/2017    FERRITIN 46 11/02/2017     Lab Results   Component Value Date    FOLATE >24.8 (H) 04/04/2017     Lab Results   Component Value Date    [id number] >2,000 (H) 09/30/2022     Lab Results   Component Value Date    ALESSANDRO  04/04/2017     NEGATIVE This result is designed to aid in the diagnosis of many of the    ALESSANDRO  04/04/2017     systemic autoimmune disorders and is not diagnostic by itself.  Test results    ALESSANDRO  04/04/2017     should be interpreted in conjunction with the clinical evaluation of the    ALESSANDRO  04/04/2017     patient.  SLE patients undergoing steroid therapy may have negative results.     Lab Results   Component Value Date    PTT 25.4 05/25/2023    INR 0.97 05/25/2023     Assessment & Plan     1.  Ductal carcinoma in situ, estrogen and progesterone receptor positive.  Underwent surgery followed by adjuvant radiation.  On adjuvant treatment with anastrozole which she will continue through July 2028.  She continues to tolerate.  Continue current treatment.  2.  Chronic immune thrombocytopenic purpura: No major change.  Will continue with observation.  3.  Next DEXA scan in August 2025.  4.  Reviewed recent office records, imaging reports, and laboratory exams.  Discussed with patient  5.  Follow-up with Dr. Mir in 4 months,  sooner if condition did not get    Electronically signed by Anika Espino PA-C

## 2025-07-08 ENCOUNTER — LAB (OUTPATIENT)
Dept: LAB | Facility: HOSPITAL | Age: 75
End: 2025-07-08
Payer: MEDICARE

## 2025-07-08 ENCOUNTER — OFFICE VISIT (OUTPATIENT)
Dept: ONCOLOGY | Facility: CLINIC | Age: 75
End: 2025-07-08
Payer: MEDICARE

## 2025-07-08 VITALS
HEART RATE: 60 BPM | SYSTOLIC BLOOD PRESSURE: 129 MMHG | TEMPERATURE: 97.9 F | DIASTOLIC BLOOD PRESSURE: 69 MMHG | HEIGHT: 62 IN | BODY MASS INDEX: 27.27 KG/M2 | OXYGEN SATURATION: 96 % | WEIGHT: 148.2 LBS

## 2025-07-08 DIAGNOSIS — D05.11 BREAST NEOPLASM, TIS (DCIS), RIGHT: ICD-10-CM

## 2025-07-08 DIAGNOSIS — D69.6 THROMBOCYTOPENIA: ICD-10-CM

## 2025-07-08 DIAGNOSIS — D69.6 THROMBOCYTOPENIA: Primary | ICD-10-CM

## 2025-07-08 DIAGNOSIS — D05.11 BREAST NEOPLASM, TIS (DCIS), RIGHT: Primary | ICD-10-CM

## 2025-07-08 DIAGNOSIS — Z79.811 AROMATASE INHIBITOR USE: ICD-10-CM

## 2025-07-08 LAB
ALBUMIN SERPL-MCNC: 4.2 G/DL (ref 3.5–5.2)
ALBUMIN/GLOB SERPL: 1.7 G/DL
ALP SERPL-CCNC: 94 U/L (ref 39–117)
ALT SERPL W P-5'-P-CCNC: 39 U/L (ref 1–33)
ANION GAP SERPL CALCULATED.3IONS-SCNC: 14 MMOL/L (ref 5–15)
AST SERPL-CCNC: 26 U/L (ref 1–32)
BASOPHILS # BLD AUTO: 0.03 10*3/MM3 (ref 0–0.2)
BASOPHILS NFR BLD AUTO: 0.5 % (ref 0–1.5)
BILIRUB SERPL-MCNC: 0.3 MG/DL (ref 0–1.2)
BUN SERPL-MCNC: 23 MG/DL (ref 8–23)
BUN/CREAT SERPL: 20 (ref 7–25)
CALCIUM SPEC-SCNC: 9.5 MG/DL (ref 8.6–10.5)
CHLORIDE SERPL-SCNC: 105 MMOL/L (ref 98–107)
CO2 SERPL-SCNC: 22 MMOL/L (ref 22–29)
CREAT SERPL-MCNC: 1.15 MG/DL (ref 0.57–1)
DEPRECATED RDW RBC AUTO: 51.4 FL (ref 37–54)
EGFRCR SERPLBLD CKD-EPI 2021: 49.2 ML/MIN/1.73
EOSINOPHIL # BLD AUTO: 0.3 10*3/MM3 (ref 0–0.4)
EOSINOPHIL NFR BLD AUTO: 4.5 % (ref 0.3–6.2)
ERYTHROCYTE [DISTWIDTH] IN BLOOD BY AUTOMATED COUNT: 16.5 % (ref 12.3–15.4)
GLOBULIN UR ELPH-MCNC: 2.5 GM/DL
GLUCOSE SERPL-MCNC: 216 MG/DL (ref 65–99)
HCT VFR BLD AUTO: 41.8 % (ref 34–46.6)
HGB BLD-MCNC: 13.4 G/DL (ref 12–15.9)
HOLD SPECIMEN: NORMAL
LYMPHOCYTES # BLD AUTO: 2.46 10*3/MM3 (ref 0.7–3.1)
LYMPHOCYTES NFR BLD AUTO: 37.3 % (ref 19.6–45.3)
MCH RBC QN AUTO: 27.8 PG (ref 26.6–33)
MCHC RBC AUTO-ENTMCNC: 32.1 G/DL (ref 31.5–35.7)
MCV RBC AUTO: 86.7 FL (ref 79–97)
MONOCYTES # BLD AUTO: 0.64 10*3/MM3 (ref 0.1–0.9)
MONOCYTES NFR BLD AUTO: 9.7 % (ref 5–12)
NEUTROPHILS NFR BLD AUTO: 3.17 10*3/MM3 (ref 1.7–7)
NEUTROPHILS NFR BLD AUTO: 48 % (ref 42.7–76)
PLATELET # BLD AUTO: 105 10*3/MM3 (ref 140–450)
PMV BLD AUTO: 12 FL (ref 6–12)
POTASSIUM SERPL-SCNC: 4.3 MMOL/L (ref 3.5–5.2)
PROT SERPL-MCNC: 6.7 G/DL (ref 6–8.5)
RBC # BLD AUTO: 4.82 10*6/MM3 (ref 3.77–5.28)
SODIUM SERPL-SCNC: 141 MMOL/L (ref 136–145)
WBC NRBC COR # BLD AUTO: 6.6 10*3/MM3 (ref 3.4–10.8)

## 2025-07-08 PROCEDURE — 80053 COMPREHEN METABOLIC PANEL: CPT | Performed by: INTERNAL MEDICINE

## 2025-07-08 PROCEDURE — 36415 COLL VENOUS BLD VENIPUNCTURE: CPT

## 2025-07-08 PROCEDURE — 85025 COMPLETE CBC W/AUTO DIFF WBC: CPT

## 2025-07-22 NOTE — PROGRESS NOTES
Chief Complaint  Hypertension and Peripheral Neuropathy    History of Present Illness  Gi Velez presents today for follow up on hypertension and neuropathy    Hypertension  Patient does check blood pressure at home.   Home readings range from  110's to 120's/60's to 70's per patient/patient submitted blood pressure diary.  Patient reports  headache and palpitations   Patient reports they are taking medications as prescribed and they are not having side effects.    Neuropathy: She describes symptoms of numbness, burning, tingling, and cramping. Onset of symptoms was diagnoses with being diabetic, about 48 years old. Symptoms are currently of moderate severity. Symptoms occur all day and worse when she goes to bed. The patient denies squeezing, hypersensitivity, and allodynia. Symptoms are symmetric. She also describes autonomic symptoms of  dry mouth. She denies  bloating, nausea, diarrhea, and constipation. Previous treatment has included Pregablin 150 mg, which has improved symptoms.     History of Present Illness  The patient is a 75-year-old female here for a follow-up on hypertension and peripheral neuropathy.    She monitors her blood pressure at home, which typically ranges from 115 to 120 systolic and 60s to 70s diastolic. She has not recorded a systolic reading of 90 in the past two months. She takes benazepril and diltiazem 120 mg daily for her hypertension, which she reports as well-managed.    She experiences burning and tingling sensations in her feet, which are managed with pregabalin twice daily. She also takes alpha lipoic acid 600 mg once daily, magnesium 400 mg once daily, and turmeric 1000 mg once daily at bedtime. These supplements have helped control her symptoms without causing any adverse effects like diarrhea. She has one refill left on her pregabalin prescription. She also takes B12 supplements. She reports decreased sensation in the front part of her feet, just above her toes, where she  also experiences tingling and burning. Occasionally, she experiences foot cramps, which are relieved by taking baby aspirin.    She is under the care of Dr. Patterson for her diabetes, with appointments scheduled every six months. Her next appointment is in 12/2025. She has been advised to avoid dairy products due to lactose intolerance but can tolerate cheese and yogurt. She maintains a low-cholesterol diet and takes atorvastatin 40 mg daily.    She is under the care of Dr. Mir, a hematologist and oncologist, who conducts lab tests every 4 to 6 months. She has an upcoming appointment with Dr. Fuentes next month for a colonoscopy. She takes a hair, skin, and nails supplement due to brittle nails and hair loss, which provides some relief. Her thyroid function will be checked in 12/2025. She is due for a mammogram in 03/2026 and will see Dr. Coles afterwards. She is also scheduled for a bone density scan. She was released from care by Dr. Ruiz in 12/2024, marking the 2-year point following breast surgery, who reported that her scars had healed well and there were no abnormalities. She is currently being followed by an oncologist.    Social History:  Diet: Avoids dairy products due to lactose intolerance; maintains a low-cholesterol diet  Living Condition: Lives next door to her daughter    PAST SURGICAL HISTORY:  - Breast surgery (date not specified)  - Colonoscopy with polyp removal (07/21/2021)      Patient Care Team:  Mercedez Guevara MD as PCP - General (Family Medicine)  Joe Ruiz MD as Surgeon (General Surgery)  Vic Cerna MD as Consulting Physician (Endocrinology)  Monty Nation MD as Consulting Physician (Ophthalmology)  Yonatan Fernandez Jr., DPM as Consulting Physician (Podiatry)  Uriel Montenegro MD as Consulting Physician (Orthopedic Surgery)  Elias Coles MD as Consulting Physician (Radiation Oncology)  Jeffy Noyola MD as Consulting Physician  (Cardiology)  Mickey Mir MD as Consulting Physician (Hematology and Oncology)   Current Outpatient Medications on File Prior to Visit   Medication Sig    Alpha Lipoic Acid 200 MG capsule Take 600 mg by mouth Daily.    anastrozole (ARIMIDEX) 1 MG tablet Take 1 tablet by mouth Daily.    BIOTIN PO Take 1 dose by mouth Every Evening. Ld 5/18    Calcium Carbonate-Vit D-Min (CALCIUM 1200 PO) Take 1 capsule by mouth Daily.    Cholecalciferol 1000 units capsule Take 1 capsule by mouth Every Evening. Ld 5/18    CINNAMON PO Take 1,500 mg by mouth Every Night. Ld 5/18    clotrimazole-betamethasone (LOTRISONE) 1-0.05 % cream Apply 1 Application topically to the appropriate area as directed As Needed.    Cyanocobalamin (B-12 PO) Take 1,000 mcg by mouth Every Evening. Ld 5/18    docusate sodium (COLACE) 100 MG capsule Take 1 capsule by mouth Daily As Needed. None dos    ELDERBERRY PO Take 2 capsules by mouth Daily.    glimepiride (AMARYL) 4 MG tablet TAKE 2 TABLETS BY MOUTH EVERY MORNING AT BREAKFAST.    insulin degludec (TRESIBA FLEXTOUCH) 100 UNIT/ML solution pen-injector injection INJECT 22 UNITS UNDER THE SKIN INTO THE APPROPRIATE AREA AS DIRECTED DAILY. NONE PREOP    Insulin Pen Needle (1st Tier Unifine Pentips) 32G X 4 MM misc Use 4 daily to inject insulin. E11.42    MAGNESIUM CITRATE PO Take 400 mg by mouth Daily.    Misc Natural Products (TUMERSAID PO) Take  by mouth.    Omega-3 Fatty Acids (FISH OIL PO) Take 1,200 mg by mouth Every Evening. Ld 5/18    ONE TOUCH ULTRA TEST test strip 1 each by Other route Daily.    pregabalin (LYRICA) 150 MG capsule Take 1 capsule by mouth 2 (Two) Times a Day.    Turmeric (QC Tumeric Complex) 500 MG capsule Take 2 capsules by mouth Daily.    [DISCONTINUED] atorvastatin (LIPITOR) 40 MG tablet TAKE 1 TABLET BY MOUTH EVERY DAY    [DISCONTINUED] benazepril (LOTENSIN) 10 MG tablet Take 1 tablet by mouth Daily.    [DISCONTINUED] Tiadylt  MG 24 hr capsule Take 1 capsule by mouth  "Daily.    insulin aspart (NovoLOG FlexPen) 100 UNIT/ML solution pen-injector sc pen Inject 6 units ac tid plus 2 units if blood sugar >200. MDD 30 units. (Patient not taking: Reported on 7/23/2025)    Insulin Aspart, w/Niacinamide, (Fiasp FlexTouch) 100 UNIT/ML solution pen-injector Inject 6 units ac tid plus 2 units if blood sugar >200. MDD 30 units. (Patient not taking: Reported on 7/23/2025)     No current facility-administered medications on file prior to visit.       Objective   Vital Signs:   /68 (BP Location: Left arm, Patient Position: Sitting, Cuff Size: Adult)   Pulse 83   Temp 97.8 °F (36.6 °C) (Temporal)   Resp 18   Ht 157.5 cm (62.01\")   Wt 67.1 kg (148 lb)   SpO2 93%   BMI 27.06 kg/m²    BP Readings from Last 3 Encounters:   07/23/25 128/68   07/08/25 129/69   06/03/25 120/80     Wt Readings from Last 3 Encounters:   07/23/25 67.1 kg (148 lb)   07/08/25 67.2 kg (148 lb 3.2 oz)   06/03/25 66.7 kg (147 lb)         Physical Exam  Vitals and nursing note reviewed.   Constitutional:       General: She is not in acute distress.     Appearance: She is well-developed.   HENT:      Head: Normocephalic and atraumatic.   Neck:      Comments: No thyromegaly or palpable thyroid masses  Cardiovascular:      Rate and Rhythm: Normal rate and regular rhythm.      Heart sounds: No murmur heard.  Pulmonary:      Effort: Pulmonary effort is normal.      Breath sounds: Normal breath sounds. No wheezing.   Musculoskeletal:         General: Normal range of motion.      Cervical back: Normal range of motion and neck supple. No rigidity or tenderness.      Right lower leg: No edema.      Left lower leg: No edema.   Skin:     General: Skin is warm and dry.      Findings: No rash.   Neurological:      Mental Status: She is alert and oriented to person, place, and time.      Sensory: Sensory deficit present.      Comments: Slightly decreased sensation to touch toes and distal foot bilaterally   Psychiatric:         " Mood and Affect: Mood normal.         Behavior: Behavior normal.         Thought Content: Thought content normal.         Judgment: Judgment normal.          Physical Exam        No visits with results within 1 Day(s) from this visit.   Latest known visit with results is:   Lab on 07/08/2025   Component Date Value Ref Range Status    Glucose 07/08/2025 216 (H)  65 - 99 mg/dL Final    BUN 07/08/2025 23.0  8.0 - 23.0 mg/dL Final    Creatinine 07/08/2025 1.15 (H)  0.57 - 1.00 mg/dL Final    Sodium 07/08/2025 141  136 - 145 mmol/L Final    Potassium 07/08/2025 4.3  3.5 - 5.2 mmol/L Final    Chloride 07/08/2025 105  98 - 107 mmol/L Final    CO2 07/08/2025 22.0  22.0 - 29.0 mmol/L Final    Calcium 07/08/2025 9.5  8.6 - 10.5 mg/dL Final    Total Protein 07/08/2025 6.7  6.0 - 8.5 g/dL Final    Albumin 07/08/2025 4.2  3.5 - 5.2 g/dL Final    ALT (SGPT) 07/08/2025 39 (H)  1 - 33 U/L Final    AST (SGOT) 07/08/2025 26  1 - 32 U/L Final    Alkaline Phosphatase 07/08/2025 94  39 - 117 U/L Final    Total Bilirubin 07/08/2025 0.3  0.0 - 1.2 mg/dL Final    Globulin 07/08/2025 2.5  gm/dL Final    A/G Ratio 07/08/2025 1.7  g/dL Final    BUN/Creatinine Ratio 07/08/2025 20.0  7.0 - 25.0 Final    Anion Gap 07/08/2025 14.0  5.0 - 15.0 mmol/L Final    eGFR 07/08/2025 49.2 (L)  >60.0 mL/min/1.73 Final    WBC 07/08/2025 6.60  3.40 - 10.80 10*3/mm3 Final    RBC 07/08/2025 4.82  3.77 - 5.28 10*6/mm3 Final    Hemoglobin 07/08/2025 13.4  12.0 - 15.9 g/dL Final    Hematocrit 07/08/2025 41.8  34.0 - 46.6 % Final    MCV 07/08/2025 86.7  79.0 - 97.0 fL Final    MCH 07/08/2025 27.8  26.6 - 33.0 pg Final    MCHC 07/08/2025 32.1  31.5 - 35.7 g/dL Final    RDW 07/08/2025 16.5 (H)  12.3 - 15.4 % Final    RDW-SD 07/08/2025 51.4  37.0 - 54.0 fl Final    MPV 07/08/2025 12.0  6.0 - 12.0 fL Final    Platelets 07/08/2025 105 (L)  140 - 450 10*3/mm3 Final    Neutrophil % 07/08/2025 48.0  42.7 - 76.0 % Final    Lymphocyte % 07/08/2025 37.3  19.6 - 45.3 % Final     Monocyte % 07/08/2025 9.7  5.0 - 12.0 % Final    Eosinophil % 07/08/2025 4.5  0.3 - 6.2 % Final    Basophil % 07/08/2025 0.5  0.0 - 1.5 % Final    Neutrophils, Absolute 07/08/2025 3.17  1.70 - 7.00 10*3/mm3 Final    Lymphocytes, Absolute 07/08/2025 2.46  0.70 - 3.10 10*3/mm3 Final    Monocytes, Absolute 07/08/2025 0.64  0.10 - 0.90 10*3/mm3 Final    Eosinophils, Absolute 07/08/2025 0.30  0.00 - 0.40 10*3/mm3 Final    Basophils, Absolute 07/08/2025 0.03  0.00 - 0.20 10*3/mm3 Final    Extra Tube 07/08/2025 Hold for add-ons.   Final    Auto resulted.     A1C Last 3 Results          11/25/2024    08:33 2/24/2025    08:24 5/27/2025    08:49   HGBA1C Last 3 Results   Hemoglobin A1C 8.71  7.9  8.6      Lab Results   Component Value Date    CHOL 173 11/25/2024    TRIG 202 (H) 11/25/2024    HDL 33 (L) 11/25/2024     (H) 11/25/2024     Lab Results   Component Value Date    TSH 4.480 (H) 11/25/2024     Lab Results   Component Value Date    GLUCOSE 216 (H) 07/08/2025    BUN 23.0 07/08/2025    CREATININE 1.15 (H) 07/08/2025    EGFRIFNONA 56 (L) 02/07/2022    BCR 20.0 07/08/2025    K 4.3 07/08/2025    CO2 22.0 07/08/2025    CALCIUM 9.5 07/08/2025    ALBUMIN 4.2 07/08/2025    AST 26 07/08/2025    ALT 39 (H) 07/08/2025     Lab Results   Component Value Date    WBC 6.60 07/08/2025    HGB 13.4 07/08/2025    HCT 41.8 07/08/2025    MCV 86.7 07/08/2025     (L) 07/08/2025             Results  Labs   - B12 level: Above 2000   - Total cholesterol: 11/2024, 173   - LDL: 11/2024, 105             Assessment and Plan    Diagnoses and all orders for this visit:    1. Primary hypertension (Primary)  -     benazepril (LOTENSIN) 10 MG tablet; Take 1 tablet by mouth Daily.  Dispense: 90 tablet; Refill: 3  -     Tiadylt  MG 24 hr capsule; Take 1 capsule by mouth Daily.  Dispense: 90 capsule; Refill: 3    2. Diabetic peripheral neuropathy  -     Magnesium  -     Vitamin B12    3. Mixed hyperlipidemia  -     atorvastatin  (LIPITOR) 40 MG tablet; Take 1 tablet by mouth Daily.  Dispense: 90 tablet; Refill: 3    4. Type 2 diabetes mellitus with diabetic polyneuropathy, with long-term current use of insulin  -     Magnesium  -     Vitamin B12        Assessment & Plan  1. Hypertension.  - Blood pressure readings are within the normal range.  - Current regimen of benazepril and diltiazem 120 mg daily will be maintained.  - Refill for benazepril has been provided.  - Blood pressure monitoring at home shows readings in the 115-120s range.    2. Peripheral neuropathy.  - Current regimen of pregabalin, alpha lipoic acid 600 mg once a day, magnesium 400 mg once a day, and turmeric 1000 mg once a day is effectively managing symptoms.  - Lab order for B12 and magnesium levels has been issued to ensure she is not taking excessive amounts.  - Advised to continue current regimen and monitor for any adverse effects such as diarrhea or dizziness.  - Reports decreased feeling above toes, with occasional tingling and burning.    3. Diabetes mellitus.  - Continues to follow up with Dr. Patterson for diabetes management.  - Appointment scheduled in December.  - Advised to maintain a low-cholesterol diet and consider alternatives like turkey bustos or sausage.  - Should continue current diabetes management plan.    4. Lactose intolerance.  - Experiences diarrhea when consuming dairy products.  - Advised to try Lactaid when consuming dairy to alleviate symptoms.  - Can tolerate hard cheeses and yogurt.    5. Hyperlipidemia.  - Cholesterol levels are within the normal range.  - Reminded to take atorvastatin 40 mg daily and follow a low-cholesterol diet.  - Refill for atorvastatin has been provided.  - Most recent cholesterol levels: total 173, .    6. Health maintenance.  - Due for a colonoscopy next month due to previous adenomatous polyps.  - Scheduled for a bone density scan soon and a mammogram in March.  - Advised to follow up with  gastroenterologist and radiologist for these procedures.  - Last colonoscopy was on 07/21/2021, with adenomatous polyps found.    Follow-up: The patient will follow up in October for her Medicare wellness visit.      Medications Discontinued During This Encounter   Medication Reason    Tiadylt  MG 24 hr capsule Reorder    atorvastatin (LIPITOR) 40 MG tablet Reorder    benazepril (LOTENSIN) 10 MG tablet Reorder         Follow Up     Return in about 3 months (around 10/23/2025) for Medicare Wellness.    Patient was given instructions and counseling regarding her condition or for health maintenance advice. Please see specific information pulled into the AVS if appropriate.     Patient or patient representative verbalized consent for the use of Ambient Listening during the visit with  Mercedez Guevara MD for chart documentation. 7/23/2025  08:32 EDT

## 2025-07-23 ENCOUNTER — OFFICE VISIT (OUTPATIENT)
Dept: FAMILY MEDICINE CLINIC | Facility: CLINIC | Age: 75
End: 2025-07-23
Payer: MEDICARE

## 2025-07-23 VITALS
TEMPERATURE: 97.8 F | HEIGHT: 62 IN | HEART RATE: 83 BPM | WEIGHT: 148 LBS | BODY MASS INDEX: 27.23 KG/M2 | SYSTOLIC BLOOD PRESSURE: 128 MMHG | DIASTOLIC BLOOD PRESSURE: 68 MMHG | OXYGEN SATURATION: 93 % | RESPIRATION RATE: 18 BRPM

## 2025-07-23 DIAGNOSIS — E11.42 DIABETIC PERIPHERAL NEUROPATHY: ICD-10-CM

## 2025-07-23 DIAGNOSIS — Z79.4 TYPE 2 DIABETES MELLITUS WITH DIABETIC POLYNEUROPATHY, WITH LONG-TERM CURRENT USE OF INSULIN: ICD-10-CM

## 2025-07-23 DIAGNOSIS — I10 PRIMARY HYPERTENSION: Primary | ICD-10-CM

## 2025-07-23 DIAGNOSIS — E78.2 MIXED HYPERLIPIDEMIA: ICD-10-CM

## 2025-07-23 DIAGNOSIS — E11.42 TYPE 2 DIABETES MELLITUS WITH DIABETIC POLYNEUROPATHY, WITH LONG-TERM CURRENT USE OF INSULIN: ICD-10-CM

## 2025-07-23 RX ORDER — BENAZEPRIL HYDROCHLORIDE 10 MG/1
10 TABLET ORAL DAILY
Qty: 90 TABLET | Refills: 3 | Status: SHIPPED | OUTPATIENT
Start: 2025-07-23

## 2025-07-23 RX ORDER — ATORVASTATIN CALCIUM 40 MG/1
40 TABLET, FILM COATED ORAL DAILY
Qty: 90 TABLET | Refills: 3 | Status: SHIPPED | OUTPATIENT
Start: 2025-07-23

## 2025-07-23 RX ORDER — DILTIAZEM HYDROCHLORIDE 120 MG/1
120 CAPSULE, EXTENDED RELEASE ORAL DAILY
Qty: 90 CAPSULE | Refills: 3 | Status: SHIPPED | OUTPATIENT
Start: 2025-07-23

## 2025-08-29 ENCOUNTER — HOSPITAL ENCOUNTER (OUTPATIENT)
Dept: BONE DENSITY | Facility: HOSPITAL | Age: 75
Discharge: HOME | End: 2025-08-29
Payer: MEDICARE

## 2025-08-29 DIAGNOSIS — D05.11 BREAST NEOPLASM, TIS (DCIS), RIGHT: ICD-10-CM

## 2025-08-29 DIAGNOSIS — Z79.811 AROMATASE INHIBITOR USE: ICD-10-CM

## 2025-08-29 PROCEDURE — 77080 DXA BONE DENSITY AXIAL: CPT

## (undated) DEVICE — TBG PENCL TELESCP MEGADYNE SMOKE EVAC 15FT

## (undated) DEVICE — PK MINOR LAPAROTOMY 50

## (undated) DEVICE — SOLUTION,WATER,IRRIGATION,1000ML,STERILE: Brand: MEDLINE

## (undated) DEVICE — UNDERGLV SURG BIOGEL/PI PF SYNTH SURG SZ8.5 BLU 50/BX

## (undated) DEVICE — NDL HYPO PRECISIONGLIDE REG 25G 1 1/2

## (undated) DEVICE — SUT MNCRYL 4/0 PS2 27IN UD MCP426H

## (undated) DEVICE — GLV SURG SIGNATURE ESSENTIAL PF LTX SZ8

## (undated) DEVICE — ELECTRD BLD EZ CLN MOD XLNG 2.75IN

## (undated) DEVICE — KT SURG TURNOVER 050

## (undated) DEVICE — GAUZE,SPONGE,FLUFF,6"X6.75",STRL,5/TRAY: Brand: MEDLINE

## (undated) DEVICE — ADHS SKIN PREMIERPRO EXOFIN TOPICAL HI/VISC .5ML

## (undated) DEVICE — GOWN,REINFRCE,POLY,SIRUS,BREATH SLV,XXLG: Brand: MEDLINE

## (undated) DEVICE — PAD,ABDOMINAL,5"X9",STERILE,LF,1/PK: Brand: MEDLINE INDUSTRIES, INC.

## (undated) DEVICE — ANTIBACTERIAL UNDYED BRAIDED (POLYGLACTIN 910), SYNTHETIC ABSORBABLE SUTURE: Brand: COATED VICRYL

## (undated) DEVICE — CVR HNDL LT SURG ACCSSRY BLU STRL

## (undated) DEVICE — COVER,MAYO STAND,STERILE: Brand: MEDLINE

## (undated) DEVICE — GOWN,REINFORCE,POLY,SIRUS,BREATH SLV,XLG: Brand: MEDLINE

## (undated) DEVICE — SLV SCD CALF HEMOFORCE DVT THERP REPROC MD

## (undated) DEVICE — SUT SILK 3/0 SH CR8 18IN C013D